# Patient Record
Sex: MALE | Race: WHITE | NOT HISPANIC OR LATINO | Employment: OTHER | URBAN - METROPOLITAN AREA
[De-identification: names, ages, dates, MRNs, and addresses within clinical notes are randomized per-mention and may not be internally consistent; named-entity substitution may affect disease eponyms.]

---

## 2017-02-24 ENCOUNTER — TRANSCRIBE ORDERS (OUTPATIENT)
Dept: LAB | Facility: CLINIC | Age: 62
End: 2017-02-24

## 2017-02-24 ENCOUNTER — APPOINTMENT (OUTPATIENT)
Dept: LAB | Facility: CLINIC | Age: 62
End: 2017-02-24
Payer: COMMERCIAL

## 2017-02-24 DIAGNOSIS — M31.5 GIANT CELL ARTERITIS WITH POLYMYALGIA RHEUMATICA (HCC): ICD-10-CM

## 2017-02-24 DIAGNOSIS — M31.5 GIANT CELL ARTERITIS WITH POLYMYALGIA RHEUMATICA (HCC): Primary | ICD-10-CM

## 2017-02-24 LAB
ALBUMIN SERPL BCP-MCNC: 4 G/DL (ref 3.5–5)
ALP SERPL-CCNC: 98 U/L (ref 46–116)
ALT SERPL W P-5'-P-CCNC: 36 U/L (ref 12–78)
ANION GAP SERPL CALCULATED.3IONS-SCNC: 7 MMOL/L (ref 4–13)
AST SERPL W P-5'-P-CCNC: 24 U/L (ref 5–45)
BASOPHILS # BLD AUTO: 0.02 THOUSANDS/ΜL (ref 0–0.1)
BASOPHILS NFR BLD AUTO: 0 % (ref 0–1)
BILIRUB SERPL-MCNC: 0.6 MG/DL (ref 0.2–1)
BILIRUB UR QL STRIP: NEGATIVE
BUN SERPL-MCNC: 18 MG/DL (ref 5–25)
CALCIUM SERPL-MCNC: 8.9 MG/DL (ref 8.3–10.1)
CHLORIDE SERPL-SCNC: 105 MMOL/L (ref 100–108)
CLARITY UR: CLEAR
CO2 SERPL-SCNC: 30 MMOL/L (ref 21–32)
COLOR UR: YELLOW
CREAT SERPL-MCNC: 0.92 MG/DL (ref 0.6–1.3)
CRP SERPL QL: <3 MG/L
EOSINOPHIL # BLD AUTO: 0.1 THOUSAND/ΜL (ref 0–0.61)
EOSINOPHIL NFR BLD AUTO: 2 % (ref 0–6)
ERYTHROCYTE [DISTWIDTH] IN BLOOD BY AUTOMATED COUNT: 13.2 % (ref 11.6–15.1)
ERYTHROCYTE [SEDIMENTATION RATE] IN BLOOD: 3 MM/HOUR (ref 0–10)
GFR SERPL CREATININE-BSD FRML MDRD: >60 ML/MIN/1.73SQ M
GLUCOSE SERPL-MCNC: 114 MG/DL (ref 65–140)
GLUCOSE UR STRIP-MCNC: NEGATIVE MG/DL
HCT VFR BLD AUTO: 41.5 % (ref 36.5–49.3)
HGB BLD-MCNC: 14.1 G/DL (ref 12–17)
HGB UR QL STRIP.AUTO: NEGATIVE
KETONES UR STRIP-MCNC: NEGATIVE MG/DL
LEUKOCYTE ESTERASE UR QL STRIP: NEGATIVE
LYMPHOCYTES # BLD AUTO: 1.51 THOUSANDS/ΜL (ref 0.6–4.47)
LYMPHOCYTES NFR BLD AUTO: 25 % (ref 14–44)
MCH RBC QN AUTO: 29.7 PG (ref 26.8–34.3)
MCHC RBC AUTO-ENTMCNC: 34 G/DL (ref 31.4–37.4)
MCV RBC AUTO: 87 FL (ref 82–98)
MONOCYTES # BLD AUTO: 0.43 THOUSAND/ΜL (ref 0.17–1.22)
MONOCYTES NFR BLD AUTO: 7 % (ref 4–12)
NEUTROPHILS # BLD AUTO: 3.97 THOUSANDS/ΜL (ref 1.85–7.62)
NEUTS SEG NFR BLD AUTO: 66 % (ref 43–75)
NITRITE UR QL STRIP: NEGATIVE
PH UR STRIP.AUTO: 6 [PH] (ref 4.5–8)
PLATELET # BLD AUTO: 251 THOUSANDS/UL (ref 149–390)
PMV BLD AUTO: 9.5 FL (ref 8.9–12.7)
POTASSIUM SERPL-SCNC: 3.9 MMOL/L (ref 3.5–5.3)
PROT SERPL-MCNC: 6.7 G/DL (ref 6.4–8.2)
PROT UR STRIP-MCNC: NEGATIVE MG/DL
RBC # BLD AUTO: 4.75 MILLION/UL (ref 3.88–5.62)
SODIUM SERPL-SCNC: 142 MMOL/L (ref 136–145)
SP GR UR STRIP.AUTO: 1.02 (ref 1–1.03)
UROBILINOGEN UR QL STRIP.AUTO: 0.2 E.U./DL
WBC # BLD AUTO: 6.03 THOUSAND/UL (ref 4.31–10.16)

## 2017-02-24 PROCEDURE — 81003 URINALYSIS AUTO W/O SCOPE: CPT | Performed by: INTERNAL MEDICINE

## 2017-02-24 PROCEDURE — 86140 C-REACTIVE PROTEIN: CPT

## 2017-02-24 PROCEDURE — 85025 COMPLETE CBC W/AUTO DIFF WBC: CPT

## 2017-02-24 PROCEDURE — 36415 COLL VENOUS BLD VENIPUNCTURE: CPT

## 2017-02-24 PROCEDURE — 85652 RBC SED RATE AUTOMATED: CPT

## 2017-02-24 PROCEDURE — 80053 COMPREHEN METABOLIC PANEL: CPT

## 2017-05-10 ENCOUNTER — APPOINTMENT (OUTPATIENT)
Dept: LAB | Facility: CLINIC | Age: 62
End: 2017-05-10
Payer: COMMERCIAL

## 2017-05-10 ENCOUNTER — TRANSCRIBE ORDERS (OUTPATIENT)
Dept: LAB | Facility: CLINIC | Age: 62
End: 2017-05-10

## 2017-05-10 DIAGNOSIS — N13.8 ENLARGED PROSTATE WITH URINARY OBSTRUCTION: Primary | ICD-10-CM

## 2017-05-10 DIAGNOSIS — N13.8 ENLARGED PROSTATE WITH URINARY OBSTRUCTION: ICD-10-CM

## 2017-05-10 DIAGNOSIS — N40.1 ENLARGED PROSTATE WITH URINARY OBSTRUCTION: Primary | ICD-10-CM

## 2017-05-10 DIAGNOSIS — N40.1 ENLARGED PROSTATE WITH URINARY OBSTRUCTION: ICD-10-CM

## 2017-05-10 LAB — PSA SERPL-MCNC: 1.8 NG/ML (ref 0–4)

## 2017-05-10 PROCEDURE — 36415 COLL VENOUS BLD VENIPUNCTURE: CPT

## 2017-05-10 PROCEDURE — G0103 PSA SCREENING: HCPCS

## 2017-07-18 ENCOUNTER — APPOINTMENT (OUTPATIENT)
Dept: LAB | Facility: CLINIC | Age: 62
End: 2017-07-18
Payer: COMMERCIAL

## 2017-07-18 ENCOUNTER — TRANSCRIBE ORDERS (OUTPATIENT)
Dept: LAB | Facility: CLINIC | Age: 62
End: 2017-07-18

## 2017-07-18 DIAGNOSIS — R53.83 FATIGUE, UNSPECIFIED TYPE: ICD-10-CM

## 2017-07-18 DIAGNOSIS — R53.83 FATIGUE, UNSPECIFIED TYPE: Primary | ICD-10-CM

## 2017-07-18 LAB
ALBUMIN SERPL BCP-MCNC: 3.9 G/DL (ref 3.5–5)
ALP SERPL-CCNC: 95 U/L (ref 46–116)
ALT SERPL W P-5'-P-CCNC: 29 U/L (ref 12–78)
ANION GAP SERPL CALCULATED.3IONS-SCNC: 9 MMOL/L (ref 4–13)
AST SERPL W P-5'-P-CCNC: 23 U/L (ref 5–45)
BILIRUB SERPL-MCNC: 0.6 MG/DL (ref 0.2–1)
BUN SERPL-MCNC: 16 MG/DL (ref 5–25)
CALCIUM SERPL-MCNC: 9.1 MG/DL (ref 8.3–10.1)
CHLORIDE SERPL-SCNC: 105 MMOL/L (ref 100–108)
CO2 SERPL-SCNC: 29 MMOL/L (ref 21–32)
CREAT SERPL-MCNC: 0.88 MG/DL (ref 0.6–1.3)
GFR SERPL CREATININE-BSD FRML MDRD: >60 ML/MIN/1.73SQ M
GLUCOSE P FAST SERPL-MCNC: 103 MG/DL (ref 65–99)
HCT VFR BLD AUTO: 42.4 % (ref 36.5–49.3)
HGB BLD-MCNC: 14.1 G/DL (ref 12–17)
POTASSIUM SERPL-SCNC: 3.7 MMOL/L (ref 3.5–5.3)
PROT SERPL-MCNC: 6.5 G/DL (ref 6.4–8.2)
SODIUM SERPL-SCNC: 143 MMOL/L (ref 136–145)
TSH SERPL DL<=0.05 MIU/L-ACNC: 0.86 UIU/ML (ref 0.36–3.74)

## 2017-07-18 PROCEDURE — 85018 HEMOGLOBIN: CPT

## 2017-07-18 PROCEDURE — 80053 COMPREHEN METABOLIC PANEL: CPT

## 2017-07-18 PROCEDURE — 36415 COLL VENOUS BLD VENIPUNCTURE: CPT

## 2017-07-18 PROCEDURE — 85014 HEMATOCRIT: CPT

## 2017-07-18 PROCEDURE — 84443 ASSAY THYROID STIM HORMONE: CPT

## 2017-08-10 ENCOUNTER — TRANSCRIBE ORDERS (OUTPATIENT)
Dept: LAB | Facility: CLINIC | Age: 62
End: 2017-08-10

## 2017-08-10 ENCOUNTER — APPOINTMENT (OUTPATIENT)
Dept: LAB | Facility: CLINIC | Age: 62
End: 2017-08-10
Payer: COMMERCIAL

## 2017-08-10 DIAGNOSIS — Z79.899 ENCOUNTER FOR LONG-TERM CURRENT USE OF MEDICATION: ICD-10-CM

## 2017-08-10 DIAGNOSIS — M35.3 POLYMYALGIA RHEUMATICA (HCC): ICD-10-CM

## 2017-08-10 DIAGNOSIS — M31.5 GIANT CELL ARTERITIS WITH POLYMYALGIA RHEUMATICA (HCC): ICD-10-CM

## 2017-08-10 DIAGNOSIS — M35.3 POLYMYALGIA RHEUMATICA (HCC): Primary | ICD-10-CM

## 2017-08-10 DIAGNOSIS — Z79.899 ENCOUNTER FOR LONG-TERM CURRENT USE OF MEDICATION: Primary | ICD-10-CM

## 2017-08-10 LAB
25(OH)D3 SERPL-MCNC: 28.8 NG/ML (ref 30–100)
ALBUMIN SERPL BCP-MCNC: 3.9 G/DL (ref 3.5–5)
ALP SERPL-CCNC: 100 U/L (ref 46–116)
ALT SERPL W P-5'-P-CCNC: 37 U/L (ref 12–78)
ANION GAP SERPL CALCULATED.3IONS-SCNC: 8 MMOL/L (ref 4–13)
AST SERPL W P-5'-P-CCNC: 21 U/L (ref 5–45)
BACTERIA UR QL AUTO: NORMAL /HPF
BILIRUB SERPL-MCNC: 0.4 MG/DL (ref 0.2–1)
BILIRUB UR QL STRIP: NEGATIVE
BUN SERPL-MCNC: 22 MG/DL (ref 5–25)
CALCIUM SERPL-MCNC: 9.2 MG/DL (ref 8.3–10.1)
CHLORIDE SERPL-SCNC: 105 MMOL/L (ref 100–108)
CLARITY UR: CLEAR
CO2 SERPL-SCNC: 30 MMOL/L (ref 21–32)
COLOR UR: YELLOW
CREAT SERPL-MCNC: 1.04 MG/DL (ref 0.6–1.3)
CRP SERPL QL: <3 MG/L
ERYTHROCYTE [DISTWIDTH] IN BLOOD BY AUTOMATED COUNT: 13.1 % (ref 11.6–15.1)
ERYTHROCYTE [SEDIMENTATION RATE] IN BLOOD: 2 MM/HOUR (ref 0–10)
GFR SERPL CREATININE-BSD FRML MDRD: 77 ML/MIN/1.73SQ M
GLUCOSE SERPL-MCNC: 107 MG/DL (ref 65–140)
GLUCOSE UR STRIP-MCNC: NEGATIVE MG/DL
HCT VFR BLD AUTO: 42.8 % (ref 36.5–49.3)
HGB BLD-MCNC: 14.3 G/DL (ref 12–17)
HGB UR QL STRIP.AUTO: NEGATIVE
KETONES UR STRIP-MCNC: NEGATIVE MG/DL
LEUKOCYTE ESTERASE UR QL STRIP: NEGATIVE
MCH RBC QN AUTO: 29.1 PG (ref 26.8–34.3)
MCHC RBC AUTO-ENTMCNC: 33.4 G/DL (ref 31.4–37.4)
MCV RBC AUTO: 87 FL (ref 82–98)
MUCOUS THREADS UR QL AUTO: NORMAL
NITRITE UR QL STRIP: NEGATIVE
NON-SQ EPI CELLS URNS QL MICRO: NORMAL /HPF
PH UR STRIP.AUTO: 6 [PH] (ref 4.5–8)
PLATELET # BLD AUTO: 251 THOUSANDS/UL (ref 149–390)
PMV BLD AUTO: 9.7 FL (ref 8.9–12.7)
POTASSIUM SERPL-SCNC: 4.3 MMOL/L (ref 3.5–5.3)
PROT SERPL-MCNC: 6.9 G/DL (ref 6.4–8.2)
PROT UR STRIP-MCNC: NEGATIVE MG/DL
RBC # BLD AUTO: 4.91 MILLION/UL (ref 3.88–5.62)
RBC #/AREA URNS AUTO: NORMAL /HPF
SODIUM SERPL-SCNC: 143 MMOL/L (ref 136–145)
SP GR UR STRIP.AUTO: >=1.03 (ref 1–1.03)
TSH SERPL DL<=0.05 MIU/L-ACNC: 1.68 UIU/ML (ref 0.36–3.74)
UROBILINOGEN UR QL STRIP.AUTO: 0.2 E.U./DL
WBC # BLD AUTO: 6.66 THOUSAND/UL (ref 4.31–10.16)
WBC #/AREA URNS AUTO: NORMAL /HPF

## 2017-08-10 PROCEDURE — 86140 C-REACTIVE PROTEIN: CPT

## 2017-08-10 PROCEDURE — 82306 VITAMIN D 25 HYDROXY: CPT

## 2017-08-10 PROCEDURE — 84443 ASSAY THYROID STIM HORMONE: CPT

## 2017-08-10 PROCEDURE — 85652 RBC SED RATE AUTOMATED: CPT

## 2017-08-10 PROCEDURE — 86618 LYME DISEASE ANTIBODY: CPT

## 2017-08-10 PROCEDURE — 81001 URINALYSIS AUTO W/SCOPE: CPT | Performed by: INTERNAL MEDICINE

## 2017-08-10 PROCEDURE — 80053 COMPREHEN METABOLIC PANEL: CPT

## 2017-08-10 PROCEDURE — 85027 COMPLETE CBC AUTOMATED: CPT

## 2017-08-10 PROCEDURE — 36415 COLL VENOUS BLD VENIPUNCTURE: CPT

## 2017-08-10 PROCEDURE — 82542 COL CHROMOTOGRAPHY QUAL/QUAN: CPT

## 2017-08-11 LAB
B BURGDOR IGG SER IA-ACNC: 0.07
B BURGDOR IGM SER IA-ACNC: 0.22

## 2017-08-15 LAB
CLOMIPRAMINE SERPL-MCNC: 33 NG/ML (ref 70–200)
CLOMIPRAMINE+NOR SERPL-MCNC: 112 NG/ML (ref 220–500)
NORCLOMIPRAMINE SERPL-MCNC: 79 NG/ML (ref 150–300)

## 2017-10-09 ENCOUNTER — ALLSCRIPTS OFFICE VISIT (OUTPATIENT)
Dept: OTHER | Facility: OTHER | Age: 62
End: 2017-10-09

## 2017-10-09 DIAGNOSIS — S86.012A STRAIN OF LEFT ACHILLES TENDON: ICD-10-CM

## 2017-10-11 NOTE — PROGRESS NOTES
Assessment  1  Pain of left heel (729 5) (M34 432)   2  Left elbow pain (719 42) (M26 522)   3  Strain of left Achilles tendon, initial encounter (865 09) (S86 012A)    Plan  Strain of left Achilles tendon, initial encounter    · Follow-up visit in 6 weeks Evaluation and Treatment  Follow-up with Dr Kevin Parada, sports  medicine  Status: Complete  Done: [id number]   · *1 - [facility]  57year old male with 3 weeks of achilles  strain post fall from a   Please evaluate and treat as necessary  Status:  Active  Requested for: [id number]  Care Summary provided  : Yes    Discussion/Summary    We discussed with Mr Toy Fonseca our findings being most consistent with a left Achilles strain  Information regarding stretching and exercises provided to help with this as well as script for physical therapy  Can use aleve prn  F/u in 6 weeks  The patient was counseled regarding diagnostic results,-instructions for management,-risk factor reductions,-patient and family education,-impressions,-importance of compliance with treatment  The treatment plan was reviewed with the patient/guardian  The patient/guardian understands and agrees with the treatment plan      Chief Complaint  left calf pain      History of Present Illness  Foot Problem: Symptoms:  pain, but-no swelling,-no localized bruising,-no discoloration,-no difficulty bearing weight-and-no decreased range of motion  Exacerbating factors:  direct pressure  Relieving factors:  rest-and-ice, but-not relieved by nonsteroidal anti-inflammatory drugs  HPI: Mr Toy Fonseca is a 57year old gentleman presenting with multiple joint complaints but most concerning for a left calf pain after a fall from his  approximately 2-3 weeks ago  He primarily notes in the distal portion of his calf, especially with kneeling on her right leg and going to stand with the left leg from that position  Rest does improve the pain        Review of Systems    Constitutional: No fever or chills, feels well, no tiredness, no recent weight loss or weight gain  Eyes: No complaints of red eyes, no eyesight problems  ENT: no complaints of loss of hearing, no nosebleeds, no sore throat  Cardiovascular: No complaints of chest pain, no palpitations, no leg claudication or lower extremity edema  Respiratory: No complaints of shortness of breath, no wheezing, no cough  Gastrointestinal: No complaints of abdominal pain, no constipation, no nausea or vomiting, no diarrhea or bloody stools  Genitourinary: No complaints of dysuria or incontinence, no hesitancy, no nocturia  Musculoskeletal: as noted in HPI  Integumentary: No complaints of skin rash or lesion, no itching or dry skin, no skin wounds  Neurological: numbness  Psychiatric: depression  Endocrine: No muscle weakness, no frequent urination, no excessive thirst, no feelings of weakness  Active Problems  1  Closed displaced fracture of distal phalanx of left index finger, initial encounter (816 02)   (M62 461N)   2  Closed nondisplaced fracture of middle phalanx of left middle finger, initial encounter   (816 01) (S62 653A)   3  Olecranon bursitis of left elbow (726 33) (M70 22)   4  Pain in joint of left shoulder (719 41) (M25 512)   5  Shoulder Instability Labral Tear Left Posterior (718 01)   6  Sprained Acromioclavicular Joint (840 0)    Past Medical History    The active problems and past medical history were reviewed and updated today  Surgical History    The surgical history was reviewed and updated today  Family History  Mother    · Family history of malignant neoplasm (V16 9) (Z80 9)   · Family history of osteopenia (V17 89) (Z82 69)    The family history was reviewed and updated today  Social History   · Never a smoker  The social history was reviewed and updated today  The social history was reviewed and is unchanged  Current Meds   1   ClomiPRAMINE HCl - 25 MG Oral Capsule Recorded   2  ClonazePAM 0 5 MG Oral Tablet Recorded   3  Folic Acid 1 MG Oral Tablet; Therapy: (Recorded:09Oct2017) to Recorded   4  Methotrexate Sodium 2 5 MG TABS; Therapy: (Recorded:09Oct2017) to Recorded   5  Simvastatin 10 MG Oral Tablet Recorded   6  Viibryd TABS Recorded   7  Vitamin D3 1000 UNIT Oral Tablet; Therapy: (Recorded:09Oct2017) to Recorded   8  Vyvanse CAPS Recorded    The medication list was reviewed and updated today  Allergies  Denied    1  Nitrous Oxide    Vitals   Recorded: [id number] 10:08AM   Heart Rate 97   Systolic 070   Diastolic 66   Height 5 ft 7 in   Weight 157 lb    BMI Calculated 24 59   BSA Calculated 1 82     Physical Exam    Left Ankle: Appearance: no Achilles defect,-no Achilles thickening,-no dislocation,-no ecchymosis,-no erythema-and-no swelling  Tenderness: Achilles tendon insertion,-Achilles musculotendinous junction-and-retrocalcaneal bursae, but-not the ATFL,-not the deltoid ligament,-not the lateral malleolus-and-not the medial malleolus  Most painful to palpation at the musculotendinous junction of the achilles  ROM: Full  Motor: Normal  Special Tests: negative Anterior Drawer Sign,-no posterior tibialis tendon subluxation,-negative Squeeze Test-and-negative Magana Test    Constitutional - General appearance: Normal    Cardiovascular - Pulses: Normal  Posterior tibialis pulse was 2+ on the left  Dorsalis pedis pulse was 2+ on the left  Neurologic - Sensation: Normal light touch intact of left foot  Psychiatric - Orientation to person, place, and time: Normal -Mood and affect: Normal    Eyes   Pupils and irises: Normal        Attending Note  Attending Note Arnaud Tubbs: Attending Note: I interviewed, took the history and examined the patient,-I discussed the case with the Resident and reviewed the Resident's note,-I supervised the Resident-and-I agree with the Resident management plan as it was presented to me   Level of Participation: I was present in clinic and examined the patient  I agree with the Resident's note  Future Appointments    Date/Time Provider Specialty Site   11/20/2017 08:20 PABLITO Russo  Orthopedic Surgery [facility]     Signatures   Electronically signed by : Nay Kennedy DO; Oct  9 2017  1:23PM EST                       (Author)    Electronically signed by :  PABLITO Marquez ; Oct 10 2017  2:26PM EST                       (Author)

## 2017-10-18 ENCOUNTER — GENERIC CONVERSION - ENCOUNTER (OUTPATIENT)
Dept: OTHER | Facility: OTHER | Age: 62
End: 2017-10-18

## 2017-10-18 ENCOUNTER — APPOINTMENT (OUTPATIENT)
Dept: PHYSICAL THERAPY | Facility: OTHER | Age: 62
End: 2017-10-18
Payer: COMMERCIAL

## 2017-10-18 PROCEDURE — 97161 PT EVAL LOW COMPLEX 20 MIN: CPT

## 2017-10-18 PROCEDURE — G8990 OTHER PT/OT CURRENT STATUS: HCPCS

## 2017-10-18 PROCEDURE — G8991 OTHER PT/OT GOAL STATUS: HCPCS

## 2017-10-24 ENCOUNTER — APPOINTMENT (OUTPATIENT)
Dept: PHYSICAL THERAPY | Facility: OTHER | Age: 62
End: 2017-10-24
Payer: COMMERCIAL

## 2017-10-24 PROCEDURE — 97112 NEUROMUSCULAR REEDUCATION: CPT

## 2017-10-24 PROCEDURE — 97110 THERAPEUTIC EXERCISES: CPT

## 2017-10-26 ENCOUNTER — GENERIC CONVERSION - ENCOUNTER (OUTPATIENT)
Dept: OTHER | Facility: OTHER | Age: 62
End: 2017-10-26

## 2017-10-26 ENCOUNTER — APPOINTMENT (OUTPATIENT)
Dept: PHYSICAL THERAPY | Facility: OTHER | Age: 62
End: 2017-10-26
Payer: COMMERCIAL

## 2017-10-26 PROCEDURE — 97140 MANUAL THERAPY 1/> REGIONS: CPT

## 2017-10-26 PROCEDURE — 97110 THERAPEUTIC EXERCISES: CPT

## 2017-10-26 PROCEDURE — 97112 NEUROMUSCULAR REEDUCATION: CPT

## 2017-11-01 ENCOUNTER — APPOINTMENT (OUTPATIENT)
Dept: PHYSICAL THERAPY | Facility: OTHER | Age: 62
End: 2017-11-01
Payer: COMMERCIAL

## 2017-11-01 PROCEDURE — 97110 THERAPEUTIC EXERCISES: CPT

## 2017-11-01 PROCEDURE — 97140 MANUAL THERAPY 1/> REGIONS: CPT

## 2017-11-01 PROCEDURE — 97112 NEUROMUSCULAR REEDUCATION: CPT

## 2017-11-02 ENCOUNTER — APPOINTMENT (OUTPATIENT)
Dept: PHYSICAL THERAPY | Facility: OTHER | Age: 62
End: 2017-11-02
Payer: COMMERCIAL

## 2017-11-06 ENCOUNTER — APPOINTMENT (OUTPATIENT)
Dept: PHYSICAL THERAPY | Facility: OTHER | Age: 62
End: 2017-11-06
Payer: COMMERCIAL

## 2017-11-06 PROCEDURE — 97112 NEUROMUSCULAR REEDUCATION: CPT

## 2017-11-06 PROCEDURE — 97140 MANUAL THERAPY 1/> REGIONS: CPT

## 2017-11-06 PROCEDURE — 97110 THERAPEUTIC EXERCISES: CPT

## 2017-11-08 ENCOUNTER — APPOINTMENT (OUTPATIENT)
Dept: PHYSICAL THERAPY | Facility: OTHER | Age: 62
End: 2017-11-08
Payer: COMMERCIAL

## 2017-11-08 PROCEDURE — 97140 MANUAL THERAPY 1/> REGIONS: CPT

## 2017-11-08 PROCEDURE — G8991 OTHER PT/OT GOAL STATUS: HCPCS

## 2017-11-08 PROCEDURE — G8990 OTHER PT/OT CURRENT STATUS: HCPCS

## 2017-11-08 PROCEDURE — 97110 THERAPEUTIC EXERCISES: CPT

## 2017-11-13 ENCOUNTER — APPOINTMENT (OUTPATIENT)
Dept: PHYSICAL THERAPY | Facility: OTHER | Age: 62
End: 2017-11-13
Payer: COMMERCIAL

## 2017-11-15 ENCOUNTER — APPOINTMENT (OUTPATIENT)
Dept: PHYSICAL THERAPY | Facility: OTHER | Age: 62
End: 2017-11-15
Payer: COMMERCIAL

## 2017-11-20 ENCOUNTER — ALLSCRIPTS OFFICE VISIT (OUTPATIENT)
Dept: OTHER | Facility: OTHER | Age: 62
End: 2017-11-20

## 2017-11-20 ENCOUNTER — APPOINTMENT (OUTPATIENT)
Dept: PHYSICAL THERAPY | Facility: OTHER | Age: 62
End: 2017-11-20
Payer: COMMERCIAL

## 2017-11-22 ENCOUNTER — APPOINTMENT (OUTPATIENT)
Dept: PHYSICAL THERAPY | Facility: OTHER | Age: 62
End: 2017-11-22
Payer: COMMERCIAL

## 2017-11-25 NOTE — PROGRESS NOTES
Assessment  1  Strain of left Achilles tendon, initial encounter (034 09) (J20 013T)    Plan     · Follow-up PRN Evaluation and Treatment  Follow-up with Dr Shana Keys, sports medicine Status: Complete  Done: [id number]    Discussion/Summary    We are very happy with his response to therapy and explained the importance of continuing with home exercises  He can f/u prn if recurrence occurs  The patient was counseled regarding instructions for management,-- patient and family education,-- impressions,-- importance of compliance with treatment  The treatment plan was reviewed with the patient/guardian  The patient/guardian understands and agrees with the treatment plan      Chief Complaint    1  Leg Pain    History of Present Illness  HPI: Mr Deepti Montague presents for f/u left achilles strain  He feels perfect and has had no recurrence of the pain  He has not used any medication (NSAID, acetaminophen) over the last 3 weeks  Leg Pain: Domnick Rides presents with complaints of no leg pain  Associated symptoms include no swelling,-- no ecchymosis,-- no decreased range of motion,-- no difficulty bearing weight,-- no difficulty ambulating,-- no calf tenderness,-- no numbness,-- no tingling-- and-- no weakness  Review of Systems   Constitutional: No fever or chills, feels well, no tiredness, no recent weight loss or weight gain  Eyes: No complaints of red eyes, no eyesight problems  ENT: no complaints of loss of hearing, no nosebleeds, no sore throat  Cardiovascular: No complaints of chest pain, no palpitations, no leg claudication or lower extremity edema  Respiratory: No complaints of shortness of breath, no wheezing, no cough  Gastrointestinal: No complaints of abdominal pain, no constipation, no nausea or vomiting, no diarrhea or bloody stools  Genitourinary: No complaints of dysuria or incontinence, no hesitancy, no nocturia  Musculoskeletal: as noted in HPI    Integumentary: No complaints of skin rash or lesion, no itching or dry skin, no skin wounds  Neurological: No complaints of headache, no confusion, no numbness or tingling, no dizziness  Psychiatric: No suicidal thoughts, no anxiety, no depression  Endocrine: No muscle weakness, no frequent urination, no excessive thirst, no feelings of weakness  Active Problems    1  Closed displaced fracture of distal phalanx of left index finger, initial encounter (816 02) (M70 109T)   2  Closed nondisplaced fracture of middle phalanx of left middle finger, initial encounter (816 01) (S62 653A)   3  Left elbow pain (719 42) (M25 522)   4  Olecranon bursitis of left elbow (726 33) (M70 22)   5  Pain in joint of left shoulder (719 41) (M25 512)   6  Pain of left heel (729 5) (M79 672)   7  Shoulder Instability Labral Tear Left Posterior (718 01)   8  Sprained Acromioclavicular Joint (840 0)   9  Strain of left Achilles tendon, initial encounter (845 09) (P73 110T)    Past Medical History    The active problems and past medical history were reviewed and updated today  Surgical History    The surgical history was reviewed and updated today  Family History  Mother    · Family history of malignant neoplasm (V16 9) (Z80 9)   · Family history of osteopenia (V17 89) (Z82 69)    The family history was reviewed and updated today  Social History     · Never a smoker  The social history was reviewed and updated today  The social history was reviewed and is unchanged  Current Meds   1  ClomiPRAMINE HCl - 25 MG Oral Capsule Recorded   2  ClonazePAM 0 5 MG Oral Tablet Recorded   3  Folic Acid 1 MG Oral Tablet; Therapy: (Recorded:09Oct2017) to Recorded   4  Methotrexate Sodium 2 5 MG TABS; Therapy: (Recorded:09Oct2017) to Recorded   5  Simvastatin 10 MG Oral Tablet Recorded   6  Viibryd TABS Recorded   7  Vitamin D3 1000 UNIT Oral Tablet; Therapy: (Recorded:09Oct2017) to Recorded   8   Vyvanse CAPS Recorded    The medication list was reviewed and updated today  Allergies  Denied    1  Nitrous Oxide    Vitals   Recorded: 20Nov2017 08:25AM   Heart Rate 98   Systolic 019   Diastolic 67   Height 5 ft 7 in   Weight 157 lb    BMI Calculated 24 59   BSA Calculated 1 82       Physical Exam    Left Ankle: Appearance: no Achilles defect,-- no Achilles thickening,-- no dislocation,-- no ecchymosis,-- no erythema-- and-- no swelling  Tenderness: not the Achilles tendon insertion,-- not the Achilles musculotendinous junction,-- not the ATFL,-- not the retrocalcaneal bursae,-- not the deltoid ligament,-- not the lateral malleolus-- and-- not the medial malleolus  Most painful to palpation at the musculotendinous junction of the achilles  ROM: Full  Motor: Normal  Special Tests: negative Squeeze Test-- and-- negative Magana Test   Constitutional - General appearance: Normal   Cardiovascular - Pulses: Normal  Posterior tibialis pulse was 2+ on the left  Dorsalis pedis pulse was 2+ on the left  Neurologic - Sensation: Normal light touch intact of left foot  Psychiatric - Orientation to person, place, and time: Normal -- Mood and affect: Normal   Eyes  Pupils and irises: Normal        Attending Note  Attending Note Esperanza Arnold: Attending Note: I interviewed, took the history and examined the patient,-- I discussed the case with the Resident and reviewed the Resident's note,-- I supervised the Resident-- and-- I agree with the Resident management plan as it was presented to me  Level of Participation: I was present in clinic and examined the patient  I agree with the Resident's note  End of Encounter Meds    1  ClomiPRAMINE HCl - 25 MG Oral Capsule Recorded   2  ClonazePAM 0 5 MG Oral Tablet Recorded   3  Folic Acid 1 MG Oral Tablet; Therapy: (Recorded:09Oct2017) to Recorded   4  Methotrexate Sodium 2 5 MG TABS; Therapy: (Recorded:09Oct2017) to Recorded   5  Simvastatin 10 MG Oral Tablet Recorded   6  Viibryd TABS Recorded   7  Vitamin D3 1000 UNIT Oral Tablet;  Therapy: (RIRGBPGT:[id number]) to Recorded   8  Vyvanse CAPS Recorded    Signatures   Electronically signed by : Keerthi Lew DO; Nov 20 2017  8:44AM EST                       (Author)    Electronically signed by :  PABLITO Cody ; Nov 24 2017  8:08AM EST                       (Author)

## 2018-01-13 VITALS
SYSTOLIC BLOOD PRESSURE: 111 MMHG | HEIGHT: 67 IN | HEART RATE: 98 BPM | BODY MASS INDEX: 24.64 KG/M2 | DIASTOLIC BLOOD PRESSURE: 67 MMHG | WEIGHT: 157 LBS

## 2018-01-14 VITALS
WEIGHT: 157 LBS | SYSTOLIC BLOOD PRESSURE: 101 MMHG | BODY MASS INDEX: 24.64 KG/M2 | DIASTOLIC BLOOD PRESSURE: 66 MMHG | HEART RATE: 97 BPM | HEIGHT: 67 IN

## 2018-02-03 ENCOUNTER — TRANSCRIBE ORDERS (OUTPATIENT)
Dept: LAB | Facility: CLINIC | Age: 63
End: 2018-02-03

## 2018-02-03 ENCOUNTER — APPOINTMENT (OUTPATIENT)
Dept: LAB | Facility: CLINIC | Age: 63
End: 2018-02-03
Payer: COMMERCIAL

## 2018-02-03 DIAGNOSIS — M31.5 GIANT CELL ARTERITIS WITH POLYMYALGIA RHEUMATICA (HCC): ICD-10-CM

## 2018-02-03 DIAGNOSIS — E78.5 HYPERLIPIDEMIA, UNSPECIFIED HYPERLIPIDEMIA TYPE: ICD-10-CM

## 2018-02-03 DIAGNOSIS — R53.83 FATIGUE, UNSPECIFIED TYPE: ICD-10-CM

## 2018-02-03 DIAGNOSIS — M35.3 POLYMYALGIA RHEUMATICA (HCC): Primary | ICD-10-CM

## 2018-02-03 DIAGNOSIS — E78.5 HYPERLIPIDEMIA, UNSPECIFIED HYPERLIPIDEMIA TYPE: Primary | ICD-10-CM

## 2018-02-03 DIAGNOSIS — M35.3 POLYMYALGIA RHEUMATICA (HCC): ICD-10-CM

## 2018-02-03 LAB
ALBUMIN SERPL BCP-MCNC: 3.7 G/DL (ref 3.5–5)
ALP SERPL-CCNC: 107 U/L (ref 46–116)
ALT SERPL W P-5'-P-CCNC: 37 U/L (ref 12–78)
ANION GAP SERPL CALCULATED.3IONS-SCNC: 10 MMOL/L (ref 4–13)
AST SERPL W P-5'-P-CCNC: 24 U/L (ref 5–45)
BASOPHILS # BLD AUTO: 0.03 THOUSANDS/ΜL (ref 0–0.1)
BASOPHILS NFR BLD AUTO: 1 % (ref 0–1)
BILIRUB SERPL-MCNC: 0.7 MG/DL (ref 0.2–1)
BILIRUB UR QL STRIP: NEGATIVE
BUN SERPL-MCNC: 16 MG/DL (ref 5–25)
CALCIUM SERPL-MCNC: 9.1 MG/DL (ref 8.3–10.1)
CHLORIDE SERPL-SCNC: 105 MMOL/L (ref 100–108)
CHOLEST SERPL-MCNC: 184 MG/DL (ref 50–200)
CLARITY UR: CLEAR
CO2 SERPL-SCNC: 28 MMOL/L (ref 21–32)
COLOR UR: YELLOW
CREAT SERPL-MCNC: 0.99 MG/DL (ref 0.6–1.3)
CRP SERPL QL: <3 MG/L
EOSINOPHIL # BLD AUTO: 0.15 THOUSAND/ΜL (ref 0–0.61)
EOSINOPHIL NFR BLD AUTO: 3 % (ref 0–6)
ERYTHROCYTE [DISTWIDTH] IN BLOOD BY AUTOMATED COUNT: 13.2 % (ref 11.6–15.1)
ERYTHROCYTE [SEDIMENTATION RATE] IN BLOOD: 2 MM/HOUR (ref 0–10)
EST. AVERAGE GLUCOSE BLD GHB EST-MCNC: 131 MG/DL
GFR SERPL CREATININE-BSD FRML MDRD: 81 ML/MIN/1.73SQ M
GLUCOSE P FAST SERPL-MCNC: 94 MG/DL (ref 65–99)
GLUCOSE UR STRIP-MCNC: NEGATIVE MG/DL
HBA1C MFR BLD: 6.2 % (ref 4.2–6.3)
HCT VFR BLD AUTO: 43 % (ref 36.5–49.3)
HDLC SERPL-MCNC: 59 MG/DL (ref 40–60)
HGB BLD-MCNC: 14.6 G/DL (ref 12–17)
HGB UR QL STRIP.AUTO: NEGATIVE
KETONES UR STRIP-MCNC: NEGATIVE MG/DL
LDLC SERPL CALC-MCNC: 108 MG/DL (ref 0–100)
LEUKOCYTE ESTERASE UR QL STRIP: NEGATIVE
LYMPHOCYTES # BLD AUTO: 1.37 THOUSANDS/ΜL (ref 0.6–4.47)
LYMPHOCYTES NFR BLD AUTO: 28 % (ref 14–44)
MCH RBC QN AUTO: 29.4 PG (ref 26.8–34.3)
MCHC RBC AUTO-ENTMCNC: 34 G/DL (ref 31.4–37.4)
MCV RBC AUTO: 87 FL (ref 82–98)
MONOCYTES # BLD AUTO: 0.43 THOUSAND/ΜL (ref 0.17–1.22)
MONOCYTES NFR BLD AUTO: 9 % (ref 4–12)
NEUTROPHILS # BLD AUTO: 2.89 THOUSANDS/ΜL (ref 1.85–7.62)
NEUTS SEG NFR BLD AUTO: 59 % (ref 43–75)
NITRITE UR QL STRIP: NEGATIVE
PH UR STRIP.AUTO: 5.5 [PH] (ref 4.5–8)
PLATELET # BLD AUTO: 238 THOUSANDS/UL (ref 149–390)
PMV BLD AUTO: 9.5 FL (ref 8.9–12.7)
POTASSIUM SERPL-SCNC: 3.9 MMOL/L (ref 3.5–5.3)
PROT SERPL-MCNC: 6.6 G/DL (ref 6.4–8.2)
PROT UR STRIP-MCNC: NEGATIVE MG/DL
RBC # BLD AUTO: 4.97 MILLION/UL (ref 3.88–5.62)
SODIUM SERPL-SCNC: 143 MMOL/L (ref 136–145)
SP GR UR STRIP.AUTO: 1.01 (ref 1–1.03)
TRIGL SERPL-MCNC: 85 MG/DL
UROBILINOGEN UR QL STRIP.AUTO: 0.2 E.U./DL
WBC # BLD AUTO: 4.87 THOUSAND/UL (ref 4.31–10.16)

## 2018-02-03 PROCEDURE — 86140 C-REACTIVE PROTEIN: CPT

## 2018-02-03 PROCEDURE — 85025 COMPLETE CBC W/AUTO DIFF WBC: CPT

## 2018-02-03 PROCEDURE — 80061 LIPID PANEL: CPT

## 2018-02-03 PROCEDURE — 85652 RBC SED RATE AUTOMATED: CPT

## 2018-02-03 PROCEDURE — 83036 HEMOGLOBIN GLYCOSYLATED A1C: CPT

## 2018-02-03 PROCEDURE — 81003 URINALYSIS AUTO W/O SCOPE: CPT | Performed by: INTERNAL MEDICINE

## 2018-02-03 PROCEDURE — 80053 COMPREHEN METABOLIC PANEL: CPT

## 2018-02-03 PROCEDURE — 36415 COLL VENOUS BLD VENIPUNCTURE: CPT

## 2018-02-16 ENCOUNTER — APPOINTMENT (OUTPATIENT)
Dept: URGENT CARE | Age: 63
End: 2018-02-16
Payer: OTHER MISCELLANEOUS

## 2018-02-16 ENCOUNTER — HOSPITAL ENCOUNTER (OUTPATIENT)
Dept: RADIOLOGY | Age: 63
Discharge: HOME | End: 2018-02-16
Payer: OTHER MISCELLANEOUS

## 2018-02-16 ENCOUNTER — TRANSCRIBE ORDERS (OUTPATIENT)
Dept: ADMINISTRATIVE | Age: 63
End: 2018-02-16

## 2018-02-16 DIAGNOSIS — T14.90XA INJURY: Primary | ICD-10-CM

## 2018-02-16 DIAGNOSIS — T14.90XA INJURY: ICD-10-CM

## 2018-02-16 PROCEDURE — 99285 EMERGENCY DEPT VISIT HI MDM: CPT | Performed by: PREVENTIVE MEDICINE

## 2018-02-16 PROCEDURE — 90471 IMMUNIZATION ADMIN: CPT | Performed by: PREVENTIVE MEDICINE

## 2018-02-16 PROCEDURE — G0384 LEV 5 HOSP TYPE B ED VISIT: HCPCS | Performed by: PREVENTIVE MEDICINE

## 2018-02-16 PROCEDURE — 70450 CT HEAD/BRAIN W/O DYE: CPT

## 2018-02-19 ENCOUNTER — APPOINTMENT (OUTPATIENT)
Dept: URGENT CARE | Age: 63
End: 2018-02-19
Payer: OTHER MISCELLANEOUS

## 2018-02-19 PROCEDURE — 99213 OFFICE O/P EST LOW 20 MIN: CPT

## 2018-02-22 ENCOUNTER — APPOINTMENT (OUTPATIENT)
Dept: URGENT CARE | Age: 63
End: 2018-02-22
Payer: OTHER MISCELLANEOUS

## 2018-02-22 PROCEDURE — 99213 OFFICE O/P EST LOW 20 MIN: CPT | Performed by: PREVENTIVE MEDICINE

## 2018-03-19 ENCOUNTER — APPOINTMENT (OUTPATIENT)
Dept: RADIOLOGY | Age: 63
End: 2018-03-19
Payer: OTHER MISCELLANEOUS

## 2018-03-19 ENCOUNTER — APPOINTMENT (OUTPATIENT)
Dept: URGENT CARE | Age: 63
End: 2018-03-19
Payer: OTHER MISCELLANEOUS

## 2018-03-19 ENCOUNTER — TRANSCRIBE ORDERS (OUTPATIENT)
Dept: URGENT CARE | Age: 63
End: 2018-03-19

## 2018-03-19 DIAGNOSIS — T14.90XA INJURY: Primary | ICD-10-CM

## 2018-03-19 DIAGNOSIS — T14.90XA INJURY: ICD-10-CM

## 2018-03-19 PROCEDURE — 99283 EMERGENCY DEPT VISIT LOW MDM: CPT | Performed by: PREVENTIVE MEDICINE

## 2018-03-19 PROCEDURE — G0382 LEV 3 HOSP TYPE B ED VISIT: HCPCS | Performed by: PREVENTIVE MEDICINE

## 2018-03-19 PROCEDURE — 73060 X-RAY EXAM OF HUMERUS: CPT

## 2018-03-22 ENCOUNTER — APPOINTMENT (OUTPATIENT)
Dept: URGENT CARE | Age: 63
End: 2018-03-22
Payer: OTHER MISCELLANEOUS

## 2018-03-22 PROCEDURE — 99213 OFFICE O/P EST LOW 20 MIN: CPT | Performed by: PREVENTIVE MEDICINE

## 2018-04-13 ENCOUNTER — TRANSCRIBE ORDERS (OUTPATIENT)
Dept: URGENT CARE | Age: 63
End: 2018-04-13

## 2018-04-13 ENCOUNTER — APPOINTMENT (OUTPATIENT)
Dept: URGENT CARE | Age: 63
End: 2018-04-13
Payer: OTHER MISCELLANEOUS

## 2018-04-13 ENCOUNTER — APPOINTMENT (OUTPATIENT)
Dept: RADIOLOGY | Age: 63
End: 2018-04-13
Payer: OTHER MISCELLANEOUS
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
## 2018-04-13 PROCEDURE — G0383 LEV 4 HOSP TYPE B ED VISIT: HCPCS | Performed by: PREVENTIVE MEDICINE

## 2018-04-13 PROCEDURE — 73030 X-RAY EXAM OF SHOULDER: CPT

## 2018-04-13 PROCEDURE — 73060 X-RAY EXAM OF HUMERUS: CPT

## 2018-04-13 PROCEDURE — 99284 EMERGENCY DEPT VISIT MOD MDM: CPT | Performed by: PREVENTIVE MEDICINE

## 2018-04-17 ENCOUNTER — APPOINTMENT (OUTPATIENT)
Dept: URGENT CARE | Age: 63
End: 2018-04-17
Payer: OTHER MISCELLANEOUS

## 2018-04-17 PROCEDURE — 99213 OFFICE O/P EST LOW 20 MIN: CPT | Performed by: PREVENTIVE MEDICINE

## 2018-05-09 ENCOUNTER — APPOINTMENT (OUTPATIENT)
Dept: RADIOLOGY | Facility: OTHER | Age: 63
End: 2018-05-09
Payer: COMMERCIAL

## 2018-05-09 ENCOUNTER — OFFICE VISIT (OUTPATIENT)
Dept: OBGYN CLINIC | Facility: OTHER | Age: 63
End: 2018-05-09
Payer: COMMERCIAL

## 2018-05-09 VITALS
HEIGHT: 67 IN | HEART RATE: 88 BPM | BODY MASS INDEX: 24.33 KG/M2 | DIASTOLIC BLOOD PRESSURE: 65 MMHG | SYSTOLIC BLOOD PRESSURE: 99 MMHG | WEIGHT: 155 LBS

## 2018-05-09 DIAGNOSIS — M25.562 ACUTE PAIN OF LEFT KNEE: ICD-10-CM

## 2018-05-09 DIAGNOSIS — M25.562 ACUTE PAIN OF LEFT KNEE: Primary | ICD-10-CM

## 2018-05-09 DIAGNOSIS — Z01.89 ENCOUNTER FOR LOWER EXTREMITY COMPARISON IMAGING STUDY: ICD-10-CM

## 2018-05-09 PROCEDURE — 73564 X-RAY EXAM KNEE 4 OR MORE: CPT

## 2018-05-09 PROCEDURE — 99204 OFFICE O/P NEW MOD 45 MIN: CPT | Performed by: ORTHOPAEDIC SURGERY

## 2018-05-09 PROCEDURE — 73562 X-RAY EXAM OF KNEE 3: CPT

## 2018-05-09 NOTE — PROGRESS NOTES
Orthopaedic Surgery - Office Note  Nancy Amos (31 y o  male)   : 1955   MRN: [id number]  Encounter Date: 2018    Chief Complaint   Patient presents with    Left Knee - Pain       Assessment / Plan  Left knee pain and mechanical symptoms, concerning for medial meniscus tear    · MRI of left knee  · Activity as tolerated  · Anti-inflammatories or Tylenol prn pain  Return for F/U after MRI  History of Present Illness  Nancy Amos is a 60 y o  male who presents for evaluation for left anterior knee pain, worse over the past 2 weeks  There was no specific injury  He has a remote history of a knee injury, which was not a fracture, further details are unclear  He currently has pain radiating to the posterior calf  Denies groin or thigh pain  Denies numbness in the LLE  Denies back pain  Denies swelling or locking or giving way  Review of Systems  Pertinent items are noted in HPI  All other systems were reviewed and are negative  Physical Exam  BP 99/65   Pulse 88   Ht 5' 7" (1 702 m)   Wt 70 3 kg (155 lb)   BMI 24 28 kg/m²   Cons: Appears well  No apparent distress  Psych: Alert  Oriented x3  Mood and affect normal   Eyes: PERRLA, EOMI  Resp: Normal effort  No audible wheezing or stridor  CV: Palpable pulse  No discernable arrhythmia  No LE edema  Lymph:  No palpable cervical, axillary, or inguinal lymphadenopathy  Skin: Warm  No palpable masses  No visible lesions  Neuro: Normal muscle tone  Normal and symmetric DTR's  Left Knee Exam  Alignment:  Normal knee alignment  Inspection:  mild swelling  No muscle atrophy  Palpation:  moderate medial and lateral joint line tenderness and calf tenderness tenderness  ROM:  Knee Extension 0  Knee Flexion 125  Strength:  5/5 quadriceps and hamstrings  Stability:  No objective knee instability  Stable Varus / Valgus stress, Lachman, and Posterior drawer  Tests:  (+) Juan  (+) Thessaly    Patella: Patella tracks centrally without crepitus  Neurovascular:  Sensation intact in DP/SP/Nettles/Sa/T nerve distributions  2+ DP & PT pulses  Gait:  Smooth  Studies Reviewed  I have personally reviewed pertinent films in PACS  XR of left knee - mild degenerative changes  no acute fractures    Procedures  No procedures today  Medical, Surgical, Family, and Social History  The patient's medical history, family history, and social history, were reviewed and updated as appropriate  Past Medical History:   Diagnosis Date    Hyperlipidemia     Polymyalgia rheumatica syndrome ([address] )     Psychiatric disorder     anxiety       Past Surgical History:   Procedure Laterality Date    APPENDECTOMY      KIDNEY STONE SURGERY      TONSILLECTOMY      TRANSURETHRAL RESECTION OF PROSTATE         Family History   Problem Relation Age of Onset    Diabetes Mother     Heart attack Father     Prostate cancer Father        Social History     Occupational History    Not on file       Social History Main Topics    Smoking status: Never Smoker    Smokeless tobacco: Never Used    Alcohol use No    Drug use: No    Sexual activity: Not on file       No Known Allergies      Current Outpatient Prescriptions:     clomiPRAMINE (ANAFRANIL) 25 mg capsule, Take 50 mg by mouth daily at bedtime, Disp: , Rfl:     clonazePAM (KlonoPIN) 0 5 mg tablet, Take 0 5 mg by mouth 2 (two) times a day as needed for seizures, Disp: , Rfl:     folic acid (FOLVITE) 1 mg tablet, Take 1 mg by mouth daily, Disp: , Rfl:     lisdexamfetamine (VYVANSE) 30 MG capsule, Take 30 mg by mouth every morning, Disp: , Rfl:     methotrexate 2 5 MG tablet, Take by mouth 5 tablets on Sundays , Disp: , Rfl:     simvastatin (ZOCOR) 5 MG tablet, Take 5 mg by mouth daily, Disp: , Rfl:     vilazodone (VIIBRYD) 10 mg tablet, Take 40 mg by mouth daily with breakfast, Disp: , Rfl:       Leyla Butler MD    Scribe Attestation    I,:    am acting as a scribe while in the presence of the attending physician :        I,:    personally performed the services described in this documentation    as scribed in my presence :

## 2018-05-16 ENCOUNTER — HOSPITAL ENCOUNTER (OUTPATIENT)
Dept: RADIOLOGY | Age: 63
Discharge: HOME | End: 2018-05-16
Payer: COMMERCIAL

## 2018-05-16 DIAGNOSIS — M25.562 ACUTE PAIN OF LEFT KNEE: ICD-10-CM

## 2018-05-16 PROCEDURE — 73721 MRI JNT OF LWR EXTRE W/O DYE: CPT

## 2018-05-23 ENCOUNTER — OFFICE VISIT (OUTPATIENT)
Dept: OBGYN CLINIC | Facility: OTHER | Age: 63
End: 2018-05-23
Payer: COMMERCIAL

## 2018-05-23 VITALS
DIASTOLIC BLOOD PRESSURE: 72 MMHG | SYSTOLIC BLOOD PRESSURE: 108 MMHG | HEART RATE: 99 BPM | HEIGHT: 67 IN | BODY MASS INDEX: 25.11 KG/M2 | WEIGHT: 160 LBS

## 2018-05-23 DIAGNOSIS — M25.562 ACUTE PAIN OF LEFT KNEE: Primary | ICD-10-CM

## 2018-05-23 PROCEDURE — 99213 OFFICE O/P EST LOW 20 MIN: CPT | Performed by: ORTHOPAEDIC SURGERY

## 2018-05-23 PROCEDURE — 20610 DRAIN/INJ JOINT/BURSA W/O US: CPT | Performed by: ORTHOPAEDIC SURGERY

## 2018-05-23 RX ORDER — BUPIVACAINE HYDROCHLORIDE 2.5 MG/ML
4 INJECTION, SOLUTION INFILTRATION; PERINEURAL
Status: COMPLETED | OUTPATIENT
Start: 2018-05-23 | End: 2018-05-23

## 2018-05-23 RX ORDER — METHYLPREDNISOLONE ACETATE 40 MG/ML
1 INJECTION, SUSPENSION INTRA-ARTICULAR; INTRALESIONAL; INTRAMUSCULAR; SOFT TISSUE
Status: COMPLETED | OUTPATIENT
Start: 2018-05-23 | End: 2018-05-23

## 2018-05-23 RX ADMIN — METHYLPREDNISOLONE ACETATE 1 ML: 40 INJECTION, SUSPENSION INTRA-ARTICULAR; INTRALESIONAL; INTRAMUSCULAR; SOFT TISSUE at 16:05

## 2018-05-23 RX ADMIN — BUPIVACAINE HYDROCHLORIDE 4 ML: 2.5 INJECTION, SOLUTION INFILTRATION; PERINEURAL at 16:05

## 2018-05-23 NOTE — PROGRESS NOTES
Orthopaedic Surgery - Office Note  Maryanne Reardon (39 y o  male)   : 1955   MRN: [id number]  Encounter Date: 2018    Chief Complaint   Patient presents with    Left Knee - Follow-up       Assessment / Plan  Left stable MMT and mild OA    · MRI was reviewed with the patient  · CSI given into left knee  · HEP reviewed  · Activity as tolerated  · Anti-inflammatories or Tylenol prn pain  Return if symptoms worsen or fail to improve  History of Present Illness  Maryanne Reardon is a 60 y o  male who presents for follow up of left anterior knee pain, present for about a month  There was no specific injury  He has a remote history of a knee injury, which was not a fracture, further details are unclear  Since the last visit, he has had significant improvements  He has not really been modifying activity or treating in any particular way  He is here to review his MRI  Review of Systems  Pertinent items are noted in HPI  All other systems were reviewed and are negative  Physical Exam  /72   Pulse 99   Ht 5' 7" (1 702 m)   Wt 72 6 kg (160 lb)   BMI 25 06 kg/m²   Cons: Appears well  No apparent distress  Psych: Alert  Oriented x3  Mood and affect normal   Eyes: PERRLA, EOMI  Resp: Normal effort  No audible wheezing or stridor  CV: Palpable pulse  No discernable arrhythmia  No LE edema  Lymph:  No palpable cervical, axillary, or inguinal lymphadenopathy  Skin: Warm  No palpable masses  No visible lesions  Neuro: Normal muscle tone  Normal and symmetric DTR's  Left Knee Exam  Alignment:  Normal knee alignment  Inspection:  mild swelling  No muscle atrophy  Palpation:  moderate medial and lateral joint line tenderness and calf tenderness tenderness  ROM:  Knee Extension 0  Knee Flexion 125  Strength:  5/5 quadriceps and hamstrings  Stability:  No objective knee instability  Stable Varus / Valgus stress, Lachman, and Posterior drawer  Tests:  (+) Juan   (+) Thessaly  Patella:  Patella tracks centrally without crepitus  Neurovascular:  Sensation intact in DP/SP/Nettles/Sa/T nerve distributions  2+ DP & PT pulses  Gait:  Smooth  Studies Reviewed  I have personally reviewed pertinent films in PACS  MRI of left knee - small stable appearing medial meniscus tear, popliteus myotendinous junction tear, grade 1 MCL sprain    Large joint arthrocentesis  Date/Time: 5/23/2018 4:05 PM  Consent given by: patient  Supporting Documentation  Indications: pain   Procedure Details  Location: knee - L knee  Preparation: Alcohol  Needle size: 22 G  Approach: superolateral   Medications administered: 4 mL bupivacaine 0 25 %; 1 mL methylPREDNISolone acetate 40 mg/mL    Patient tolerance: patient tolerated the procedure well with no immediate complications  Dressing:  Sterile dressing applied           Medical, Surgical, Family, and Social History  The patient's medical history, family history, and social history, were reviewed and updated as appropriate  Past Medical History:   Diagnosis Date    Hyperlipidemia     [address]yalgia rhe[address]a syndrome ([facility] [address]     Psychiatric disorder     anxiety       Past Surgical History:   Procedure Laterality Date    APPENDECTOMY      KIDNEY STONE SURGERY      TONSILLECTOMY      TRANSURETHRAL RESECTION OF PROSTATE         Family History   Problem Relation Age of Onset    Diabetes Mother     Heart attack Father     Prostate cancer Father        Social History     Occupational History    Not on file       Social History Main Topics    Smoking status: Never Smoker    Smokeless tobacco: Never Used    Alcohol use No    Drug use: No    Sexual activity: Not on file       No Known Allergies      Current Outpatient Prescriptions:     clomiPRAMINE (ANAFRANIL) 25 mg capsule, Take 50 mg by mouth daily at bedtime, Disp: , Rfl:     clonazePAM (KlonoPIN) 0 5 mg tablet, Take 0 5 mg by mouth 2 (two) times a day as needed for seizures, Disp: , Rfl:    folic acid (FOLVITE) 1 mg tablet, Take 1 mg by mouth daily, Disp: , Rfl:     lisdexamfetamine (VYVANSE) 30 MG capsule, Take 30 mg by mouth every morning, Disp: , Rfl:     methotrexate 2 5 MG tablet, Take by mouth 5 tablets on Sundays , Disp: , Rfl:     simvastatin (ZOCOR) 5 MG tablet, Take 5 mg by mouth daily, Disp: , Rfl:     vilazodone (VIIBRYD) 10 mg tablet, Take 40 mg by mouth daily with breakfast, Disp: , Rfl:       Laree Brunner, MD    Scribe Attestation    I,:    am acting as a scribe while in the presence of the attending physician :        I,:    personally performed the services described in this documentation    as scribed in my presence :

## 2018-06-01 ENCOUNTER — TRANSCRIBE ORDERS (OUTPATIENT)
Dept: ADMINISTRATIVE | Age: 63
End: 2018-06-01

## 2018-06-01 ENCOUNTER — APPOINTMENT (OUTPATIENT)
Dept: LAB | Age: 63
End: 2018-06-01
Payer: COMMERCIAL

## 2018-06-01 DIAGNOSIS — N13.8 ENLARGED PROSTATE WITH URINARY OBSTRUCTION: ICD-10-CM

## 2018-06-01 DIAGNOSIS — N13.8 ENLARGED PROSTATE WITH URINARY OBSTRUCTION: Primary | ICD-10-CM

## 2018-06-01 DIAGNOSIS — N40.1 ENLARGED PROSTATE WITH URINARY OBSTRUCTION: ICD-10-CM

## 2018-06-01 DIAGNOSIS — N40.1 ENLARGED PROSTATE WITH URINARY OBSTRUCTION: Primary | ICD-10-CM

## 2018-06-01 PROCEDURE — 84153 ASSAY OF PSA TOTAL: CPT

## 2018-06-02 LAB — PSA SERPL-MCNC: 1.7 NG/ML (ref 0–4)

## 2018-06-04 ENCOUNTER — OFFICE VISIT (OUTPATIENT)
Dept: UROLOGY | Facility: CLINIC | Age: 63
End: 2018-06-04
Payer: COMMERCIAL

## 2018-06-04 VITALS — BODY MASS INDEX: 26.68 KG/M2 | HEIGHT: 67 IN | WEIGHT: 170 LBS

## 2018-06-04 DIAGNOSIS — N40.1 BPH WITH OBSTRUCTION/LOWER URINARY TRACT SYMPTOMS: Primary | ICD-10-CM

## 2018-06-04 DIAGNOSIS — N13.8 BPH WITH OBSTRUCTION/LOWER URINARY TRACT SYMPTOMS: Primary | ICD-10-CM

## 2018-06-04 LAB
SL AMB  POCT GLUCOSE, UA: NORMAL
SL AMB LEUKOCYTE ESTERASE,UA: NORMAL
SL AMB POCT BILIRUBIN,UA: NORMAL
SL AMB POCT BLOOD,UA: NORMAL
SL AMB POCT CLARITY,UA: CLEAR
SL AMB POCT COLOR,UA: YELLOW
SL AMB POCT KETONES,UA: NORMAL
SL AMB POCT NITRITE,UA: NORMAL
SL AMB POCT PH,UA: 5
SL AMB POCT SPECIFIC GRAVITY,UA: NORMAL
SL AMB POCT URINE PROTEIN: NORMAL
SL AMB POCT UROBILINOGEN: NORMAL

## 2018-06-04 PROCEDURE — 81002 URINALYSIS NONAUTO W/O SCOPE: CPT | Performed by: PHYSICIAN ASSISTANT

## 2018-06-04 PROCEDURE — 99213 OFFICE O/P EST LOW 20 MIN: CPT | Performed by: PHYSICIAN ASSISTANT

## 2018-06-04 NOTE — PROGRESS NOTES
UROLOGY PROGRESS NOTE   Patient Identifiers: Roger Rodriguez (MRN [id number])  Date of Service: 6/4/2018    Subjective:    60year old male hx of BPH  PSA 1 7  He had a TURP in 2014  Urine is clear  Patient has  no complaints  Objective:     VITALS:    There were no vitals filed for this visit  LABS:  Lab Results   Component Value Date    HGB 14 6 02/03/2018    HCT 43 0 02/03/2018    WBC 4 87 02/03/2018     02/03/2018   ]    Lab Results   Component Value Date     02/03/2018    K 3 9 02/03/2018     02/03/2018    CO2 28 02/03/2018    BUN 16 02/03/2018    CREATININE 0 99 02/03/2018    CALCIUM 9 1 02/03/2018    GLUCOSE 107 08/10/2017   ]    INPATIENT MEDS:    Current Outpatient Prescriptions:     clomiPRAMINE (ANAFRANIL) 25 mg capsule, Take 50 mg by mouth daily at bedtime, Disp: , Rfl:     clonazePAM (KlonoPIN) 0 5 mg tablet, Take 0 5 mg by mouth 2 (two) times a day as needed for seizures, Disp: , Rfl:     folic acid (FOLVITE) 1 mg tablet, Take 1 mg by mouth daily, Disp: , Rfl:     lisdexamfetamine (VYVANSE) 30 MG capsule, Take 30 mg by mouth every morning, Disp: , Rfl:     methotrexate 2 5 MG tablet, Take by mouth 5 tablets on Sundays , Disp: , Rfl:     simvastatin (ZOCOR) 5 MG tablet, Take 5 mg by mouth daily, Disp: , Rfl:     vilazodone (VIIBRYD) 10 mg tablet, Take 40 mg by mouth daily with breakfast, Disp: , Rfl:       Physical Exam:   Ht 5' 7" (1 702 m)   Wt 77 1 kg (170 lb)   BMI 26 63 kg/m²   GEN: no acute distress    RESP: breathing comfortably with no accessory muscle use    ABD: soft, non-tender, non-distended   INCISION:    EXT: no significant peripheral edema   (Male): Penis circumcised, phallus normal, meatus patent  Testicles descended into scrotum bilaterally without masses nor tenderness  No inguinal hernias bilaterally  VIVIAN: Prostate is not enlarged at 30 grams  The prostate is not boggy  The prostate is not tender    No nodules noted    RADIOLOGY: none     Assessment:   1   BPH     Plan:   - one year follow up PSA prior  -  -  -

## 2018-08-08 ENCOUNTER — OFFICE VISIT (OUTPATIENT)
Dept: OBGYN CLINIC | Facility: OTHER | Age: 63
End: 2018-08-08
Payer: COMMERCIAL

## 2018-08-08 VITALS
HEART RATE: 114 BPM | DIASTOLIC BLOOD PRESSURE: 67 MMHG | BODY MASS INDEX: 23.83 KG/M2 | WEIGHT: 151.8 LBS | HEIGHT: 67 IN | SYSTOLIC BLOOD PRESSURE: 96 MMHG

## 2018-08-08 DIAGNOSIS — S83.242D ACUTE MEDIAL MENISCAL TEAR, LEFT, SUBSEQUENT ENCOUNTER: Primary | ICD-10-CM

## 2018-08-08 DIAGNOSIS — M17.12 PRIMARY OSTEOARTHRITIS OF LEFT KNEE: ICD-10-CM

## 2018-08-08 PROCEDURE — 99214 OFFICE O/P EST MOD 30 MIN: CPT | Performed by: ORTHOPAEDIC SURGERY

## 2018-08-08 PROCEDURE — 20610 DRAIN/INJ JOINT/BURSA W/O US: CPT | Performed by: ORTHOPAEDIC SURGERY

## 2018-08-08 RX ORDER — BUPIVACAINE HYDROCHLORIDE 2.5 MG/ML
4 INJECTION, SOLUTION INFILTRATION; PERINEURAL
Status: COMPLETED | OUTPATIENT
Start: 2018-08-08 | End: 2018-08-08

## 2018-08-08 RX ORDER — METHYLPREDNISOLONE ACETATE 40 MG/ML
1 INJECTION, SUSPENSION INTRA-ARTICULAR; INTRALESIONAL; INTRAMUSCULAR; SOFT TISSUE
Status: COMPLETED | OUTPATIENT
Start: 2018-08-08 | End: 2018-08-08

## 2018-08-08 RX ADMIN — BUPIVACAINE HYDROCHLORIDE 4 ML: 2.5 INJECTION, SOLUTION INFILTRATION; PERINEURAL at 15:08

## 2018-08-08 RX ADMIN — METHYLPREDNISOLONE ACETATE 1 ML: 40 INJECTION, SUSPENSION INTRA-ARTICULAR; INTRALESIONAL; INTRAMUSCULAR; SOFT TISSUE at 15:08

## 2018-08-08 NOTE — PROGRESS NOTES
Orthopaedic Surgery - Office Note  Celia Nunez (78 y o  male)   : 1955   MRN: [id number]  Encounter Date: 2018    Chief Complaint   Patient presents with    Left Knee - Follow-up       Assessment / Plan  Left knee MMT    · CSI of left knee joint was performed  · Activity as tolerated   · Conservative vs surgical treatment options discussed with the patient  Patient will consider surgery for 2018  Return in about 2 months (around 10/8/2018)  History of Present Illness  Celia Nunez is a 60 y o  male who presents for follow up of left knee pain  Patient has a known medial meniscal tear and mild OA seen on MRI  He received a cortisone injection at the last visit on 18 and which helped until about 1 wk ago (1st wk of Aug 2018)  He admits he has not been doing his HEP as instructed since he was doing well  He complains of increased medial knee pain with stairs and climbing ladders which he does often at work  He states the pain is tolerable with normal weightbearing activities  Review of Systems  Pertinent items are noted in HPI  All other systems were reviewed and are negative  Physical Exam  BP 96/67   Pulse (!) 114   Ht 5' 7" (1 702 m)   Wt 68 9 kg (151 lb 12 8 oz)   BMI 23 78 kg/m²   Cons: Appears well  No apparent distress  Psych: Alert  Oriented x3  Mood and affect normal   Eyes: PERRLA, EOMI  Resp: Normal effort  No audible wheezing or stridor  CV: Palpable pulse  No discernable arrhythmia  No LE edema  Lymph:  No palpable cervical, axillary, or inguinal lymphadenopathy  Skin: Warm  No palpable masses  No visible lesions  Neuro: Normal muscle tone  Normal and symmetric DTR's  Left Knee Exam  Alignment:  Normal knee alignment  Inspection:  No edema  No erythema  No ecchymosis  No muscle atrophy  Palpation:  Tenderness at the medial joint line and medial retinaculum  ROM:  Knee Extension 0  Knee Flexion 130    Strength:  5/5 hip flexors and abductors  5/5 quadriceps and hamstrings  Stability:  No objective knee instability  Stable Varus / Valgus stress, Lachman, and Posterior drawer  Tests:  (+) Juan  Patella:  Patella tracks centrally without crepitus  Neurovascular:  Sensation intact in DP/SP/Nettles/Sa/T nerve distributions  Palpable DP & PT pulses  Gait:  Normal     Studies Reviewed  MRI of left knee - Medial meniscal tear         Large joint arthrocentesis  Date/Time: 8/8/2018 3:08 PM  Consent given by: parent  Site marked: site marked  Timeout: Immediately prior to procedure a time out was called to verify the correct patient, procedure, equipment, support staff and site/side marked as required   Supporting Documentation  Indications: pain   Procedure Details  Location: knee - L knee  Needle size: 22 G  Approach: lateral  Medications administered: 4 mL bupivacaine 0 25 %; 1 mL methylPREDNISolone acetate 40 mg/mL    Patient tolerance: patient tolerated the procedure well with no immediate complications  Dressing:  Sterile dressing applied           Medical, Surgical, Family, and Social History  The patient's medical history, family history, and social history, were reviewed and updated as appropriate  Past Medical History:   Diagnosis Date    Hyperlipidemia     Polymyalgia rheumatica syndrome ([facility] [address] )     Psychiatric disorder     anxiety       Past Surgical History:   Procedure Laterality Date    APPENDECTOMY      KIDNEY STONE SURGERY      TONSILLECTOMY      TRANSURETHRAL RESECTION OF PROSTATE         Family History   Problem Relation Age of Onset    Diabetes Mother     Heart attack Father     Prostate cancer Father        Social History     Occupational History    Not on file       Social History Main Topics    Smoking status: Never Smoker    Smokeless tobacco: Never Used    Alcohol use No    Drug use: No    Sexual activity: Not on file       No Known Allergies      Current Outpatient Prescriptions:    clomiPRAMINE (ANAFRANIL) 25 mg capsule, Take 50 mg by mouth daily at bedtime, Disp: , Rfl:     clonazePAM (KlonoPIN) 0 5 mg tablet, Take 0 5 mg by mouth 2 (two) times a day as needed for seizures, Disp: , Rfl:     folic acid (FOLVITE) 1 mg tablet, Take 1 mg by mouth daily, Disp: , Rfl:     lisdexamfetamine (VYVANSE) 30 MG capsule, Take 30 mg by mouth every morning, Disp: , Rfl:     methotrexate 2 5 MG tablet, Take by mouth 5 tablets on Sundays , Disp: , Rfl:     simvastatin (ZOCOR) 5 MG tablet, Take 5 mg by mouth daily, Disp: , Rfl:     vilazodone (VIIBRYD) 20 mg tablet, Take 20 mg by mouth daily with breakfast  , Disp: , Rfl:       Musa Guzman    I,:   Merlin Call am acting as a scribe while in the presence of the attending physician :        I,:   Yessi Roth MD personally performed the services described in this documentation    as scribed in my presence :

## 2018-09-10 ENCOUNTER — HOSPITAL ENCOUNTER (EMERGENCY)
Facility: HOSPITAL | Age: 63
Discharge: HOME | End: 2018-09-10
Attending: EMERGENCY MEDICINE | Admitting: EMERGENCY MEDICINE
Payer: OTHER MISCELLANEOUS

## 2018-09-10 ENCOUNTER — APPOINTMENT (EMERGENCY)
Dept: RADIOLOGY | Facility: HOSPITAL | Age: 63
End: 2018-09-10
Payer: OTHER MISCELLANEOUS

## 2018-09-10 ENCOUNTER — APPOINTMENT (OUTPATIENT)
Dept: URGENT CARE | Age: 63
End: 2018-09-10
Payer: OTHER MISCELLANEOUS

## 2018-09-10 VITALS
DIASTOLIC BLOOD PRESSURE: 73 MMHG | SYSTOLIC BLOOD PRESSURE: 120 MMHG | HEART RATE: 101 BPM | RESPIRATION RATE: 19 BRPM | TEMPERATURE: 97.9 F | OXYGEN SATURATION: 96 %

## 2018-09-10 DIAGNOSIS — S61.012A THUMB LACERATION, LEFT, INITIAL ENCOUNTER: Primary | ICD-10-CM

## 2018-09-10 PROCEDURE — 99284 EMERGENCY DEPT VISIT MOD MDM: CPT | Performed by: PREVENTIVE MEDICINE

## 2018-09-10 PROCEDURE — 99284 EMERGENCY DEPT VISIT MOD MDM: CPT

## 2018-09-10 PROCEDURE — G0383 LEV 4 HOSP TYPE B ED VISIT: HCPCS | Performed by: PREVENTIVE MEDICINE

## 2018-09-10 PROCEDURE — 73130 X-RAY EXAM OF HAND: CPT

## 2018-09-10 RX ORDER — HEPARIN SODIUM 1000 [USP'U]/ML
5200 INJECTION, SOLUTION INTRAVENOUS; SUBCUTANEOUS AS NEEDED
Status: DISCONTINUED | OUTPATIENT
Start: 2018-09-10 | End: 2018-09-10

## 2018-09-10 RX ORDER — HEPARIN SODIUM 10000 [USP'U]/100ML
3-30 INJECTION, SOLUTION INTRAVENOUS
Status: DISCONTINUED | OUTPATIENT
Start: 2018-09-10 | End: 2018-09-10

## 2018-09-10 RX ORDER — IBUPROFEN 600 MG/1
600 TABLET ORAL EVERY 6 HOURS PRN
Qty: 30 TABLET | Refills: 0 | Status: SHIPPED | OUTPATIENT
Start: 2018-09-10 | End: 2019-02-26

## 2018-09-10 RX ORDER — HEPARIN SODIUM 1000 [USP'U]/ML
2600 INJECTION, SOLUTION INTRAVENOUS; SUBCUTANEOUS AS NEEDED
Status: DISCONTINUED | OUTPATIENT
Start: 2018-09-10 | End: 2018-09-10

## 2018-09-10 RX ORDER — HEPARIN SODIUM 1000 [USP'U]/ML
5200 INJECTION, SOLUTION INTRAVENOUS; SUBCUTANEOUS ONCE
Status: DISCONTINUED | OUTPATIENT
Start: 2018-09-10 | End: 2018-09-10

## 2018-09-10 NOTE — ED PROVIDER NOTES
History  Chief Complaint   Patient presents with    Thumb Injury     pt sent from [place] [address]  Pt was working in workshop had 4 in plate hit his L thumb  Pt denies numbness and tingling after event  Pt moving extremity without difficulty  58-year-old male a past medical history presents to  The emergency department  After being evaluated by [place] [address] [facility]  Patient states he is at work   Drilling through a steel plate when the drill bit spun still plate spine around cutting his left thumb  Patient's thumb was provided a nerve block at [place] [address] the patient is currently in no pain  Hemostasis was controlled the patient had arrived via EMS  Patient's fingers currently dressed in a wrap  Patient denies decreased extension or flexion of the extremity  Patient has no other complaints            Prior to Admission Medications   Prescriptions Last Dose Informant Patient Reported? Taking?    clomiPRAMINE (ANAFRANIL) 25 mg capsule 9/9/2018 at Unknown time  Yes Yes   Sig: Take 50 mg by mouth daily at bedtime   clonazePAM (KlonoPIN) 0 5 mg tablet 9/10/2018 at Unknown time  Yes Yes   Sig: Take 0 5 mg by mouth 2 (two) times a day as needed for seizures   folic acid (FOLVITE) 1 mg tablet 9/10/2018 at Unknown time  Yes Yes   Sig: Take 1 mg by mouth daily   lisdexamfetamine (VYVANSE) 30 MG capsule 9/10/2018 at Unknown time  Yes Yes   Sig: Take 30 mg by mouth every morning   methotrexate 2 5 MG tablet 9/9/2018 at Unknown time  Yes Yes   Sig: Take by mouth 5 tablets on Sundays    simvastatin (ZOCOR) 5 MG tablet 9/9/2018 at Unknown time  Yes Yes   Sig: Take 5 mg by mouth daily   vilazodone (VIIBRYD) 20 mg tablet 9/10/2018 at Unknown time  Yes Yes   Sig: Take 20 mg by mouth daily with breakfast        Facility-Administered Medications: None       Past Medical History:   Diagnosis Date    Hyperlipidemia     [facility]a [facility] [address] [facility]     Psychiatric disorder     anxiety       Past Surgical History:   Procedure Laterality Date    APPENDECTOMY      KIDNEY STONE SURGERY      TONSILLECTOMY      TRANSURETHRAL RESECTION OF PROSTATE         Family History   Problem Relation Age of Onset    Diabetes Mother     Heart attack Father     Prostate cancer Father      I have reviewed and agree with the history as documented  Social History   Substance Use Topics    Smoking status: Never Smoker    Smokeless tobacco: Never Used    Alcohol use No        Review of Systems   Constitutional: Negative for chills, diaphoresis, fatigue and fever  HENT: Negative for congestion, ear discharge, facial swelling, hearing loss, rhinorrhea, sinus pain, sinus pressure, sneezing, sore throat, tinnitus and trouble swallowing  Eyes: Negative for pain, discharge and redness  Respiratory: Negative for cough, choking, chest tightness, shortness of breath, wheezing and stridor  Cardiovascular: Negative for chest pain, palpitations and leg swelling  Gastrointestinal: Negative for abdominal distention, abdominal pain, blood in stool, constipation, diarrhea, nausea and vomiting  Endocrine: Negative for cold intolerance, polydipsia and polyuria  Genitourinary: Negative for difficulty urinating, dysuria, enuresis, flank pain, frequency and hematuria  Musculoskeletal: Negative for arthralgias, back pain, gait problem and neck stiffness  Skin: Positive for wound  Negative for rash  Neurological: Negative for dizziness, seizures, syncope, weakness, numbness and headaches  Hematological: Negative for adenopathy  Psychiatric/Behavioral: Negative for agitation, confusion, hallucinations, sleep disturbance and suicidal ideas  All other systems reviewed and are negative        Physical Exam  ED Triage Vitals [09/10/18 1539]   Temperature Pulse Respirations Blood Pressure SpO2   97 9 °F (36 6 °C) 101 19 120/73 96 %      Temp Source Heart Rate Source Patient Position - Orthostatic VS BP Location FiO2 (%) Oral -- Sitting Right arm --      Pain Score       No Pain           Orthostatic Vital Signs  Vitals:    09/10/18 1539   BP: 120/73   Pulse: 101   Patient Position - Orthostatic VS: Sitting       Physical Exam   Constitutional: He is oriented to person, place, and time  He appears well-developed and well-nourished  No distress  HENT:   Head: Normocephalic and atraumatic  Eyes: Conjunctivae and EOM are normal  Pupils are equal, round, and reactive to light  Neck: Normal range of motion  Cardiovascular: Normal rate and regular rhythm  Exam reveals no gallop and no friction rub  No murmur heard  Pulmonary/Chest: Breath sounds normal  No respiratory distress  He has no wheezes  He has no rales  Abdominal: Soft  Normal appearance and bowel sounds are normal  There is no tenderness  There is no rigidity, no rebound, no guarding, no CVA tenderness, no tenderness at McBurney's point and negative Cameron's sign  Musculoskeletal: Normal range of motion  He exhibits tenderness and deformity  Neurological: He is alert and oriented to person, place, and time  No cranial nerve deficit or sensory deficit  GCS eye subscore is 4  GCS verbal subscore is 5  GCS motor subscore is 6  Reflex Scores:       Bicep reflexes are 2+ on the right side and 2+ on the left side  Patellar reflexes are 2+ on the right side and 2+ on the left side  Patient has equal 5/5 strength b/l UE and Le  No focal neuro deficits noted  Skin: Skin is warm and dry  Capillary refill takes less than 2 seconds  He is not diaphoretic  Psychiatric: He has a normal mood and affect  His behavior is normal  Judgment and thought content normal    Nursing note and vitals reviewed        ED Medications  Medications   heparin (porcine) injection 5,200 Units (not administered)   heparin (porcine) 25,000 units in 250 mL infusion (premix) (not administered)   heparin (porcine) injection 5,200 Units (not administered)   heparin (porcine) injection 2,600 Units (not administered)       Diagnostic Studies  Results Reviewed     Procedure Component Value Units Date/Time    APTT [[id number]]     Lab Status:  No result Specimen:  Blood     CBC [[id number]]     Lab Status:  No result Specimen:  Blood     Protime-INR [[id number]]     Lab Status:  No result Specimen:  Blood                  XR hand 3+ views LEFT   Final Result by Nitesh Arteaga DO (09/10 1718)      No fracture  Soft tissue irregularity distal 1st digit consistent with laceration  A few punctate foreign bodies in the tip of the 1st digit suggested  Workstation performed: [id number]               Procedures  Lac Repair  Date/Time: 9/10/2018 5:06 PM  Performed by: Bjorn Baumgarten  Authorized by: Bjorn Baumgarten   Consent: Verbal consent obtained  Risks and benefits: risks, benefits and alternatives were discussed  Consent given by: patient  Patient understanding: patient states understanding of the procedure being performed  Required items: required blood products, implants, devices, and special equipment available  Patient identity confirmed: verbally with patient  Time out: Immediately prior to procedure a "time out" was called to verify the correct patient, procedure, equipment, support staff and site/side marked as required  Body area: upper extremity  Location details: left hand  Laceration length: 3 cm  Contamination: The wound is contaminated  Foreign bodies: metal  Tendon involvement: none  Nerve involvement: none  Vascular damage: no  Anesthesia: digital block (Performed at [facility])    Sedation:  Patient sedated: no    Wound Dehiscence:  Superficial Wound Dehiscence: simple closure      Procedure Details:  Preparation: Patient was prepped and draped in the usual sterile fashion    Irrigation solution: tap water  Amount of cleaning: extensive  Debridement: none  Degree of undermining: none  Skin closure: 4-0 nylon  Subcutaneous closure: 5-0 Vicryl  Number of sutures: 8  Technique: simple  Approximation: close  Approximation difficulty: simple  Dressing: 4x4 sterile gauze and non-adhesive packing strip  Patient tolerance: Patient tolerated the procedure well with no immediate complications            Phone Consults  ED Phone Contact    ED Course                               MDM  Number of Diagnoses or Management Options  Thumb laceration, left, initial encounter: new and requires workup  Diagnosis management comments:  76-year-old male with left thumb laceration  Repaired in the emergency department see procedure not  E     1    Left thumb laceration  - repaired   -Motrin for pain  -Follow up with Hand surgery as well as PCP  CritCare Time    Disposition  Final diagnoses:   Thumb laceration, left, initial encounter     Time reflects when diagnosis was documented in both MDM as applicable and the Disposition within this note     Time User Action Codes Description Comment    9/10/2018  4:58 PM Nato Ann Add [[id number]] Thumb laceration, left, initial encounter       ED Disposition     ED Disposition Condition Comment    Discharge  Nikolai Vivas discharge to home/self care      Condition at discharge: Good        Follow-up Information     Follow up With Specialties Details Why Contact Info Additional [address] [facility] Surgery Call in 1 day  Krysten Crow [address] MD Internal Medicine   Nitin Nolen 9 [address] Bib[address] Houser [address] Emergency Department Emergency Medicine  If symptoms worsen [address]  [phone]  ED, [address] [place] [address], [address]          Patient's Medications   Discharge Prescriptions    IBUPROFEN (MOTRIN) 600 MG TABLET    Take 1 tablet (600 mg total) by mouth every 6 (six) hours as needed for moderate pain       Start Date: 9/10/2018 End Date: -- Order Dose: 600 mg       Quantity: 30 tablet    Refills: 0     No discharge procedures on file  ED Provider  Attending physically available and evaluated Kev Nguyen I managed the patient along with the ED Attending      Electronically Signed by         Janice Barr DO  09/10/18 4457

## 2018-09-10 NOTE — ED ATTENDING ATTESTATION
Maykel Rushing MD, saw and evaluated the patient  I have discussed the patient with the resident/non-physician practitioner and agree with the resident's/non-physician practitioner's findings, Plan of Care, and MDM as documented in the resident's/non-physician practitioner's note, except where noted  All available labs and Radiology studies were reviewed  At this point I agree with the current assessment done in the Emergency Department  I have conducted an independent evaluation of this patient a history and physical is as follows:      Critical Care Time  CritCare Time    Procedures     61 yo male with left thumb injury while drilling through plate and had laceration of distal thumb  No previous injury, tetanus utd  Vss, afebrile, lungs cta, rrr, 3 cm deep laceration, nvi  Xray, suture

## 2018-09-10 NOTE — DISCHARGE INSTRUCTIONS
Finger Laceration   WHAT YOU NEED TO KNOW:   A finger laceration is a deep cut in your skin  It is often caused by a sharp object, such as a knife, or blunt force to your finger  Your blood vessels, bones, joints, tendons, or nerves may also be injured  DISCHARGE INSTRUCTIONS:   Return to the emergency department if:   · Your wound comes apart  · Blood soaks through your bandage  · You have severe pain in your finger or hand  · Your finger is pale and cold  · You have sudden trouble moving your finger  · Your swelling suddenly gets worse  · You have red streaks on your skin coming from your wound  Contact your healthcare provider or hand specialist if:   · You have new numbness or tingling  · Your finger feels warm, looks swollen or red, and is draining pus  · You have a fever  · You have questions or concerns about your condition or care  Medicines: You may  need any of the following:  · Antibiotics  help prevent a bacterial infection  · Acetaminophen  decreases pain and fever  It is available without a doctor's order  Ask how much to take and how often to take it  Follow directions  Read the labels of all other medicines you are using to see if they also contain acetaminophen, or ask your doctor or pharmacist  Acetaminophen can cause liver damage if not taken correctly  Do not use more than 4 grams (4,000 milligrams) total of acetaminophen in one day  · Prescription pain medicine  may be given  Ask your healthcare provider how to take this medicine safely  Some prescription pain medicines contain acetaminophen  Do not take other medicines that contain acetaminophen without talking to your healthcare provider  Too much acetaminophen may cause liver damage  Prescription pain medicine may cause constipation  Ask your healthcare provider how to prevent or treat constipation  · Take your medicine as directed    Contact your healthcare provider if you think your medicine is not helping or if you have side effects  Tell him or her if you are allergic to any medicine  Keep a list of the medicines, vitamins, and herbs you take  Include the amounts, and when and why you take them  Bring the list or the pill bottles to follow-up visits  Carry your medicine list with you in case of an emergency  Self-care:   · Apply ice  on your finger for 15 to 20 minutes every hour or as directed  Use an ice pack, or put crushed ice in a plastic bag  Cover it with a towel before you apply it to your skin  Ice helps prevent tissue damage and decreases swelling and pain  · Elevate  your hand above the level of your heart as often as you can  This will help decrease swelling and pain  Prop your hand on pillows or blankets to keep it elevated comfortably  · Wear your splint as directed  A splint will decrease movement and stress on your wound  The splint may help your wound heal faster  Ask your healthcare provider how to apply and remove a splint  · Apply ointments to decrease scarring  Do not apply ointments until your healthcare provider says it is okay  You may need to wait until your wound is healed  Ask which ointment to buy and how often to use it  Wound care:   · Do not get your wound wet until your healthcare provider says it is okay  Do not soak your hand in water  Do not go swimming until your healthcare provider says it is okay  When your healthcare provider says it is okay, carefully wash around the wound with soap and water  Let soap and water run over your wound  Gently pat the area dry or allow it to air dry  · Change your bandages when they get wet, dirty, or after washing  Apply new, clean bandages as directed  Do not apply elastic bandages or tape too tightly  Do not put powders or lotions on your wound  · Apply antibiotic ointment as directed  Your healthcare provider may give you antibiotic ointment to put over your wound if you have stitches   If you have Strips-Strips over your wound, let them dry up and fall off on their own  If they do not fall off within 14 days, gently remove them  If you have glue over your wound, do not remove or pick at it  If your glue comes off, do not replace it with glue that you have at home  · Check your wound every day for signs of infection  Signs of infection include swelling, redness, or pus  Follow up with your healthcare provider or hand specialist in 2 days:  Write down your questions so you remember to ask them during your visits  © 2017 [employer]  Information is for End User's use only and may not be sold, redistributed or otherwise used for commercial purposes  All illustrations and images included in CareNotes® are the copyrighted property of [employer] , [employer]  or [employer]  The above information is an  only  It is not intended as medical advice for individual conditions or treatments  Talk to your doctor, nurse or pharmacist before following any medical regimen to see if it is safe and effective for you

## 2018-09-12 ENCOUNTER — APPOINTMENT (OUTPATIENT)
Dept: URGENT CARE | Age: 63
End: 2018-09-12
Payer: OTHER MISCELLANEOUS

## 2018-09-12 PROCEDURE — 99214 OFFICE O/P EST MOD 30 MIN: CPT | Performed by: PREVENTIVE MEDICINE

## 2018-10-03 ENCOUNTER — OFFICE VISIT (OUTPATIENT)
Dept: OBGYN CLINIC | Facility: OTHER | Age: 63
End: 2018-10-03
Payer: COMMERCIAL

## 2018-10-03 VITALS
WEIGHT: 158 LBS | DIASTOLIC BLOOD PRESSURE: 77 MMHG | BODY MASS INDEX: 24.8 KG/M2 | HEART RATE: 108 BPM | HEIGHT: 67 IN | SYSTOLIC BLOOD PRESSURE: 118 MMHG

## 2018-10-03 DIAGNOSIS — M25.562 ACUTE PAIN OF LEFT KNEE: Primary | ICD-10-CM

## 2018-10-03 PROCEDURE — 99213 OFFICE O/P EST LOW 20 MIN: CPT | Performed by: ORTHOPAEDIC SURGERY

## 2018-10-03 NOTE — PROGRESS NOTES
Orthopaedic Surgery - Office Note  Molly Correia (1 y o  male)   : 1955   MRN: [id number]  Encounter Date: 10/3/2018    Chief Complaint   Patient presents with    Left Knee - Follow-up       Assessment / Plan  Left knee MMT with mild OA    · nonsurgical and surgical treatment options were reviewed  · I have recommended left knee scope with PMM, given persistence of symptoms despite conservative treatment  · He will consider his options and discuss with his boss  · Activity as tolerated   Return if symptoms worsen or fail to improve  History of Present Illness  Molly Correia is a 60 y o  male who presents for follow up of left knee pain  Patient has a known medial meniscal tear and mild OA seen on MRI  He received a cortisone injection on 18 and which helped until about 2018  He had a second CSI which gave him 2 days of excellent relief  Currently continues to have anteromedial knee pain  He admits he has not been doing his HEP as instructed since he was doing well  He states the pain is tolerable with normal weightbearing activities  Review of Sys tems  Pertinent items are noted in HPI  All other systems were reviewed and are negative  Physical Exam  /77   Pulse (!) 108   Ht 5' 7" (1 702 m)   Wt 71 7 kg (158 lb)   BMI 24 75 kg/m²   Cons: Appears well  No apparent distress  Psych: Alert  Oriented x3  Mood and affect normal   Eyes: PERRLA, EOMI  Resp: Normal effort  No audible wheezing or stridor  CV: Palpable pulse  No discernable arrhythmia  No LE edema  Lymph:  No palpable cervical, axillary, or inguinal lymphadenopathy  Skin: Warm  No palpable masses  No visible lesions  Neuro: Normal muscle tone  Normal and symmetric DTR's  Left Knee Exam  Alignment:  Normal knee alignment  Inspection:  No edema  No erythema  No ecchymosis  No muscle atrophy    Palpation:  Tenderness at the medial joint line and medial retinaculum  ROM:  Knee Extension 0  Knee Flexion 130  Strength:  5/5 hip flexors and abductors  5/5 quadriceps and hamstrings  Stability:  No objective knee instability  Stable Varus / Valgus stress, Lachman, and Posterior drawer  Tests:  (+) Juan  Patella:  Patella tracks centrally without crepitus  Neurovascular:  Sensation intact in DP/SP/Nettles/Sa/T nerve distributions  Palpable DP & PT pulses  Gait:  Normal     Studies Reviewed  No studies to review       Procedures   No procedures today  Medical, Surgical, Family, and Social History  The patient's medical history, family history, and social history, were reviewed and updated as appropriate  Past Medical History:   Diagnosis Date    Hyperlipidemia     Polymyalgia rheumatica syndrome ([facility] [address] )     Psychiatric disorder     anxiety       Past Surgical History:   Procedure Laterality Date    APPENDECTOMY      KIDNEY STONE SURGERY      TONSILLECTOMY      TRANSURETHRAL RESECTION OF PROSTATE         Family History   Problem Relation Age of Onset    Diabetes Mother     Heart attack Father     Prostate cancer Father        Social History     Occupational History    Not on file       Social History Main Topics    Smoking status: Never Smoker    Smokeless tobacco: Never Used    Alcohol use No    Drug use: No    Sexual activity: Not on file       No Known Allergies      Current Outpatient Prescriptions:     clomiPRAMINE (ANAFRANIL) 25 mg capsule, Take 50 mg by mouth daily at bedtime, Disp: , Rfl:     clonazePAM (KlonoPIN) 0 5 mg tablet, Take 0 5 mg by mouth 2 (two) times a day as needed for seizures, Disp: , Rfl:     folic acid (FOLVITE) 1 mg tablet, Take 1 mg by mouth daily, Disp: , Rfl:     ibuprofen (MOTRIN) 600 mg tablet, Take 1 tablet (600 mg total) by mouth every 6 (six) hours as needed for moderate pain, Disp: 30 tablet, Rfl: 0    lisdexamfetamine (VYVANSE) 30 MG capsule, Take 30 mg by mouth every morning, Disp: , Rfl:     methotrexate 2 5 MG tablet, Take by mouth 5 tablets on Sundays , Disp: , Rfl:     simvastatin (ZOCOR) 5 MG tablet, Take 5 mg by mouth daily, Disp: , Rfl:     vilazodone (VIIBRYD) 20 mg tablet, Take 20 mg by mouth daily with breakfast  , Disp: , Rfl:       Rani Perez MD    Scribe Attestation    I,:    am acting as a scribe while in the presence of the attending physician :        I,:    personally performed the services described in this documentation    as scribed in my presence :

## 2019-01-22 ENCOUNTER — OFFICE VISIT (OUTPATIENT)
Dept: OBGYN CLINIC | Facility: CLINIC | Age: 64
End: 2019-01-22
Payer: COMMERCIAL

## 2019-01-22 VITALS
HEART RATE: 96 BPM | WEIGHT: 155 LBS | HEIGHT: 67 IN | DIASTOLIC BLOOD PRESSURE: 73 MMHG | BODY MASS INDEX: 24.33 KG/M2 | SYSTOLIC BLOOD PRESSURE: 116 MMHG

## 2019-01-22 DIAGNOSIS — M75.102 ROTATOR CUFF SYNDROME OF LEFT SHOULDER: Primary | ICD-10-CM

## 2019-01-22 PROCEDURE — 99214 OFFICE O/P EST MOD 30 MIN: CPT | Performed by: ORTHOPAEDIC SURGERY

## 2019-01-22 RX ORDER — MELOXICAM 7.5 MG/1
7.5 TABLET ORAL 2 TIMES DAILY
Qty: 60 TABLET | Refills: 0 | Status: SHIPPED | OUTPATIENT
Start: 2019-01-22 | End: 2019-02-12 | Stop reason: SDUPTHER

## 2019-01-24 ENCOUNTER — TELEPHONE (OUTPATIENT)
Dept: OBGYN CLINIC | Facility: HOSPITAL | Age: 64
End: 2019-01-24

## 2019-01-24 NOTE — TELEPHONE ENCOUNTER
Caller: New DAVIDSON/KENDAL #: [phone]  Dr Malini Ocampo    Patient called to advise he is very pleased with what Dr Malini Ocampo has done for him so far  What he did at his most recent appointment worked and he is very happy  He will see Dr Malini Ocampo for his follow-up appointment as scheduled

## 2019-02-09 ENCOUNTER — APPOINTMENT (OUTPATIENT)
Dept: LAB | Facility: CLINIC | Age: 64
End: 2019-02-09
Payer: COMMERCIAL

## 2019-02-09 ENCOUNTER — TRANSCRIBE ORDERS (OUTPATIENT)
Dept: LAB | Facility: CLINIC | Age: 64
End: 2019-02-09

## 2019-02-09 DIAGNOSIS — M35.3 POLYMYALGIA RHEUMATICA (HCC): Primary | ICD-10-CM

## 2019-02-09 DIAGNOSIS — M35.3 POLYMYALGIA RHEUMATICA (HCC): ICD-10-CM

## 2019-02-09 LAB
ALBUMIN SERPL BCP-MCNC: 3.8 G/DL (ref 3.5–5)
ALP SERPL-CCNC: 95 U/L (ref 46–116)
ALT SERPL W P-5'-P-CCNC: 46 U/L (ref 12–78)
ANION GAP SERPL CALCULATED.3IONS-SCNC: 8 MMOL/L (ref 4–13)
AST SERPL W P-5'-P-CCNC: 22 U/L (ref 5–45)
BASOPHILS # BLD AUTO: 0.03 THOUSANDS/ΜL (ref 0–0.1)
BASOPHILS NFR BLD AUTO: 1 % (ref 0–1)
BILIRUB SERPL-MCNC: 0.6 MG/DL (ref 0.2–1)
BUN SERPL-MCNC: 19 MG/DL (ref 5–25)
CALCIUM SERPL-MCNC: 9 MG/DL (ref 8.3–10.1)
CHLORIDE SERPL-SCNC: 105 MMOL/L (ref 100–108)
CO2 SERPL-SCNC: 31 MMOL/L (ref 21–32)
CREAT SERPL-MCNC: 0.84 MG/DL (ref 0.6–1.3)
CRP SERPL QL: <3 MG/L
EOSINOPHIL # BLD AUTO: 0.19 THOUSAND/ΜL (ref 0–0.61)
EOSINOPHIL NFR BLD AUTO: 4 % (ref 0–6)
ERYTHROCYTE [DISTWIDTH] IN BLOOD BY AUTOMATED COUNT: 12.9 % (ref 11.6–15.1)
ERYTHROCYTE [SEDIMENTATION RATE] IN BLOOD: 1 MM/HOUR (ref 0–10)
GFR SERPL CREATININE-BSD FRML MDRD: 93 ML/MIN/1.73SQ M
GLUCOSE SERPL-MCNC: 104 MG/DL (ref 65–140)
HCT VFR BLD AUTO: 44.3 % (ref 36.5–49.3)
HGB BLD-MCNC: 14.9 G/DL (ref 12–17)
IMM GRANULOCYTES # BLD AUTO: 0.01 THOUSAND/UL (ref 0–0.2)
IMM GRANULOCYTES NFR BLD AUTO: 0 % (ref 0–2)
LYMPHOCYTES # BLD AUTO: 1.22 THOUSANDS/ΜL (ref 0.6–4.47)
LYMPHOCYTES NFR BLD AUTO: 28 % (ref 14–44)
MCH RBC QN AUTO: 30.2 PG (ref 26.8–34.3)
MCHC RBC AUTO-ENTMCNC: 33.6 G/DL (ref 31.4–37.4)
MCV RBC AUTO: 90 FL (ref 82–98)
MONOCYTES # BLD AUTO: 0.38 THOUSAND/ΜL (ref 0.17–1.22)
MONOCYTES NFR BLD AUTO: 9 % (ref 4–12)
NEUTROPHILS # BLD AUTO: 2.48 THOUSANDS/ΜL (ref 1.85–7.62)
NEUTS SEG NFR BLD AUTO: 58 % (ref 43–75)
NRBC BLD AUTO-RTO: 0 /100 WBCS
PLATELET # BLD AUTO: 237 THOUSANDS/UL (ref 149–390)
PMV BLD AUTO: 9.9 FL (ref 8.9–12.7)
POTASSIUM SERPL-SCNC: 3.9 MMOL/L (ref 3.5–5.3)
PROT SERPL-MCNC: 6.6 G/DL (ref 6.4–8.2)
RBC # BLD AUTO: 4.94 MILLION/UL (ref 3.88–5.62)
SODIUM SERPL-SCNC: 144 MMOL/L (ref 136–145)
WBC # BLD AUTO: 4.31 THOUSAND/UL (ref 4.31–10.16)

## 2019-02-09 PROCEDURE — 80053 COMPREHEN METABOLIC PANEL: CPT

## 2019-02-09 PROCEDURE — 36415 COLL VENOUS BLD VENIPUNCTURE: CPT

## 2019-02-09 PROCEDURE — 86140 C-REACTIVE PROTEIN: CPT

## 2019-02-09 PROCEDURE — 85025 COMPLETE CBC W/AUTO DIFF WBC: CPT

## 2019-02-09 PROCEDURE — 85652 RBC SED RATE AUTOMATED: CPT

## 2019-02-12 DIAGNOSIS — M25.562 ACUTE PAIN OF LEFT KNEE: Primary | ICD-10-CM

## 2019-02-12 DIAGNOSIS — M75.102 ROTATOR CUFF SYNDROME OF LEFT SHOULDER: ICD-10-CM

## 2019-02-12 RX ORDER — MELOXICAM 7.5 MG/1
7.5 TABLET ORAL 2 TIMES DAILY
Qty: 60 TABLET | Refills: 0 | Status: SHIPPED | OUTPATIENT
Start: 2019-02-12 | End: 2022-02-09 | Stop reason: ALTCHOICE

## 2019-02-26 ENCOUNTER — OFFICE VISIT (OUTPATIENT)
Dept: OBGYN CLINIC | Facility: CLINIC | Age: 64
End: 2019-02-26
Payer: COMMERCIAL

## 2019-02-26 VITALS
HEIGHT: 67 IN | DIASTOLIC BLOOD PRESSURE: 77 MMHG | HEART RATE: 92 BPM | SYSTOLIC BLOOD PRESSURE: 117 MMHG | WEIGHT: 156 LBS | BODY MASS INDEX: 24.48 KG/M2

## 2019-02-26 DIAGNOSIS — M25.512 LEFT SHOULDER PAIN, UNSPECIFIED CHRONICITY: Primary | ICD-10-CM

## 2019-02-26 PROCEDURE — 99213 OFFICE O/P EST LOW 20 MIN: CPT | Performed by: ORTHOPAEDIC SURGERY

## 2019-02-26 NOTE — PROGRESS NOTES
Patient Name:  Tomie Buerger  MRN:  [id number]    Assessment & Plan     Fully resolved left shoulder pain  1  Continue meloxicam as needed  2  Activities as tolerated  3  Follow-up as needed  Subjective     78-year-old male returns to the office today for follow-up regarding his left shoulder  He was initially seen on 1/22/19  At that time he was prescribed meloxicam   He returns to the office today awake significant improvement  He states his pain almost fully resolved after initiating meloxicam   He denies any weakness or instability  No stiffness  No numbness or tingling  He is happy with his result  General ROS:  Negative for fever or chills  Neurological ROS:  Negative for numbness or tingling  Objective     /77   Pulse 92   Ht 5' 7" (1 702 m)   Wt 70 8 kg (156 lb)   BMI 24 43 kg/m²     Left shoulder:  No gross deformity  No tenderness to palpation  Passive range of motion includes 170° of forward flexion, 90° of external rotation-abduction, 45° of internal rotation-abduction  Negative Roberto impingement  Negative empty can test   Negative speed's test   Negative cross-body adduction test   Sensation intact axillary, median, ulnar and radial nerves  2+ radial pulse  Appropriate mood and affect        Scribe Attestation    I,:   Ramu Patel PA-C am acting as a scribe while in the presence of the attending physician :        I,:   Juan Luis Rocha MD personally performed the services described in this documentation    as scribed in my presence :

## 2019-05-31 ENCOUNTER — APPOINTMENT (OUTPATIENT)
Dept: LAB | Facility: CLINIC | Age: 64
End: 2019-05-31
Payer: COMMERCIAL

## 2019-05-31 DIAGNOSIS — N40.1 BPH WITH OBSTRUCTION/LOWER URINARY TRACT SYMPTOMS: ICD-10-CM

## 2019-05-31 DIAGNOSIS — N13.8 BPH WITH OBSTRUCTION/LOWER URINARY TRACT SYMPTOMS: ICD-10-CM

## 2019-05-31 LAB — PSA SERPL-MCNC: 1.8 NG/ML (ref 0–4)

## 2019-05-31 PROCEDURE — 84153 ASSAY OF PSA TOTAL: CPT

## 2019-06-06 ENCOUNTER — OFFICE VISIT (OUTPATIENT)
Dept: UROLOGY | Facility: CLINIC | Age: 64
End: 2019-06-06
Payer: COMMERCIAL

## 2019-06-06 VITALS
BODY MASS INDEX: 24.48 KG/M2 | SYSTOLIC BLOOD PRESSURE: 104 MMHG | HEART RATE: 82 BPM | DIASTOLIC BLOOD PRESSURE: 62 MMHG | WEIGHT: 156 LBS | HEIGHT: 67 IN

## 2019-06-06 DIAGNOSIS — N40.1 BPH WITH OBSTRUCTION/LOWER URINARY TRACT SYMPTOMS: Primary | ICD-10-CM

## 2019-06-06 DIAGNOSIS — N13.8 BPH WITH OBSTRUCTION/LOWER URINARY TRACT SYMPTOMS: Primary | ICD-10-CM

## 2019-06-06 PROCEDURE — 99213 OFFICE O/P EST LOW 20 MIN: CPT | Performed by: PHYSICIAN ASSISTANT

## 2019-07-10 ENCOUNTER — TRANSCRIBE ORDERS (OUTPATIENT)
Dept: LAB | Facility: CLINIC | Age: 64
End: 2019-07-10

## 2019-07-10 ENCOUNTER — APPOINTMENT (OUTPATIENT)
Dept: LAB | Facility: CLINIC | Age: 64
End: 2019-07-10
Payer: COMMERCIAL

## 2019-07-10 DIAGNOSIS — R53.83 FATIGUE, UNSPECIFIED TYPE: Primary | ICD-10-CM

## 2019-07-10 DIAGNOSIS — R53.83 FATIGUE, UNSPECIFIED TYPE: ICD-10-CM

## 2019-07-10 DIAGNOSIS — E78.5 HYPERLIPIDEMIA, UNSPECIFIED HYPERLIPIDEMIA TYPE: ICD-10-CM

## 2019-07-10 LAB
CHOLEST SERPL-MCNC: 182 MG/DL (ref 50–200)
EST. AVERAGE GLUCOSE BLD GHB EST-MCNC: 148 MG/DL
HBA1C MFR BLD: 6.8 % (ref 4.2–6.3)
HDLC SERPL-MCNC: 51 MG/DL (ref 40–60)
LDLC SERPL CALC-MCNC: 116 MG/DL (ref 0–100)
NONHDLC SERPL-MCNC: 131 MG/DL
T4 SERPL-MCNC: 8.7 UG/DL (ref 4.7–13.3)
TRIGL SERPL-MCNC: 75 MG/DL
TSH SERPL DL<=0.05 MIU/L-ACNC: 2.13 UIU/ML (ref 0.36–3.74)

## 2019-07-10 PROCEDURE — 84443 ASSAY THYROID STIM HORMONE: CPT

## 2019-07-10 PROCEDURE — 36415 COLL VENOUS BLD VENIPUNCTURE: CPT

## 2019-07-10 PROCEDURE — 84436 ASSAY OF TOTAL THYROXINE: CPT

## 2019-07-10 PROCEDURE — 80061 LIPID PANEL: CPT

## 2019-07-10 PROCEDURE — 83036 HEMOGLOBIN GLYCOSYLATED A1C: CPT

## 2019-10-11 ENCOUNTER — APPOINTMENT (OUTPATIENT)
Dept: LAB | Facility: CLINIC | Age: 64
End: 2019-10-11
Payer: COMMERCIAL

## 2019-10-11 ENCOUNTER — TRANSCRIBE ORDERS (OUTPATIENT)
Dept: LAB | Facility: CLINIC | Age: 64
End: 2019-10-11

## 2019-10-11 DIAGNOSIS — R73.09 ELEVATED RANDOM BLOOD GLUCOSE LEVEL: Primary | ICD-10-CM

## 2019-10-11 LAB
EST. AVERAGE GLUCOSE BLD GHB EST-MCNC: 120 MG/DL
HBA1C MFR BLD: 5.8 % (ref 4.2–6.3)

## 2019-10-11 PROCEDURE — 83036 HEMOGLOBIN GLYCOSYLATED A1C: CPT | Performed by: INTERNAL MEDICINE

## 2019-10-11 PROCEDURE — 36415 COLL VENOUS BLD VENIPUNCTURE: CPT | Performed by: INTERNAL MEDICINE

## 2020-06-10 ENCOUNTER — APPOINTMENT (OUTPATIENT)
Dept: LAB | Facility: CLINIC | Age: 65
End: 2020-06-10
Payer: MEDICARE

## 2020-06-10 ENCOUNTER — TRANSCRIBE ORDERS (OUTPATIENT)
Dept: LAB | Facility: CLINIC | Age: 65
End: 2020-06-10

## 2020-06-10 DIAGNOSIS — N13.8 BPH WITH OBSTRUCTION/LOWER URINARY TRACT SYMPTOMS: ICD-10-CM

## 2020-06-10 DIAGNOSIS — N40.1 BPH WITH OBSTRUCTION/LOWER URINARY TRACT SYMPTOMS: ICD-10-CM

## 2020-06-10 DIAGNOSIS — E78.5 HYPERLIPIDEMIA, UNSPECIFIED HYPERLIPIDEMIA TYPE: ICD-10-CM

## 2020-06-10 DIAGNOSIS — E78.5 HYPERLIPIDEMIA, UNSPECIFIED HYPERLIPIDEMIA TYPE: Primary | ICD-10-CM

## 2020-06-10 LAB
ALBUMIN SERPL BCP-MCNC: 3.9 G/DL (ref 3.5–5)
ALP SERPL-CCNC: 93 U/L (ref 46–116)
ALT SERPL W P-5'-P-CCNC: 27 U/L (ref 12–78)
ANION GAP SERPL CALCULATED.3IONS-SCNC: 6 MMOL/L (ref 4–13)
AST SERPL W P-5'-P-CCNC: 25 U/L (ref 5–45)
BILIRUB SERPL-MCNC: 0.9 MG/DL (ref 0.2–1)
BUN SERPL-MCNC: 21 MG/DL (ref 5–25)
CALCIUM SERPL-MCNC: 8.9 MG/DL (ref 8.3–10.1)
CHLORIDE SERPL-SCNC: 105 MMOL/L (ref 100–108)
CHOLEST SERPL-MCNC: 169 MG/DL (ref 50–200)
CO2 SERPL-SCNC: 29 MMOL/L (ref 21–32)
CREAT SERPL-MCNC: 1.07 MG/DL (ref 0.6–1.3)
GFR SERPL CREATININE-BSD FRML MDRD: 72 ML/MIN/1.73SQ M
GLUCOSE P FAST SERPL-MCNC: 118 MG/DL (ref 65–99)
HDLC SERPL-MCNC: 53 MG/DL
LDLC SERPL CALC-MCNC: 104 MG/DL (ref 0–100)
NONHDLC SERPL-MCNC: 116 MG/DL
POTASSIUM SERPL-SCNC: 3.6 MMOL/L (ref 3.5–5.3)
PROT SERPL-MCNC: 6.6 G/DL (ref 6.4–8.2)
PSA SERPL-MCNC: 1.7 NG/ML (ref 0–4)
SODIUM SERPL-SCNC: 140 MMOL/L (ref 136–145)
TRIGL SERPL-MCNC: 61 MG/DL

## 2020-06-10 PROCEDURE — 80061 LIPID PANEL: CPT

## 2020-06-10 PROCEDURE — 84153 ASSAY OF PSA TOTAL: CPT

## 2020-06-10 PROCEDURE — 36415 COLL VENOUS BLD VENIPUNCTURE: CPT

## 2020-06-10 PROCEDURE — 80053 COMPREHEN METABOLIC PANEL: CPT

## 2020-08-13 ENCOUNTER — OFFICE VISIT (OUTPATIENT)
Dept: UROLOGY | Facility: CLINIC | Age: 65
End: 2020-08-13
Payer: MEDICARE

## 2020-08-13 VITALS
DIASTOLIC BLOOD PRESSURE: 62 MMHG | SYSTOLIC BLOOD PRESSURE: 126 MMHG | HEART RATE: 95 BPM | BODY MASS INDEX: 24.33 KG/M2 | HEIGHT: 67 IN | TEMPERATURE: 98.1 F | WEIGHT: 155 LBS

## 2020-08-13 DIAGNOSIS — N13.8 BPH WITH OBSTRUCTION/LOWER URINARY TRACT SYMPTOMS: Primary | ICD-10-CM

## 2020-08-13 DIAGNOSIS — N40.1 BPH WITH OBSTRUCTION/LOWER URINARY TRACT SYMPTOMS: Primary | ICD-10-CM

## 2020-08-13 PROCEDURE — 99213 OFFICE O/P EST LOW 20 MIN: CPT | Performed by: PHYSICIAN ASSISTANT

## 2020-08-13 RX ORDER — BREXPIPRAZOLE 2 MG/1
2 TABLET ORAL EVERY MORNING
COMMUNITY
Start: 2020-07-16 | End: 2022-05-18

## 2020-08-13 NOTE — PROGRESS NOTES
UROLOGY PROGRESS NOTE   Patient Identifiers: Slick Riggs (MRN [id number])  Date of Service: 8/13/2020    Subjective:    14-year-old man with history of BPH  His PSA has remained 1 7  He had a TURP in 2014  He has no outlet complaints  Reason for visit:  BPH follow-up    Objective:     VITALS:    There were no vitals filed for this visit  LABS:  Lab Results   Component Value Date    HGB 14 9 02/09/2019    HCT 44 3 02/09/2019    WBC 4 31 02/09/2019     02/09/2019   ]    Lab Results   Component Value Date     11/21/2015    K 3 6 06/10/2020     06/10/2020    CO2 29 06/10/2020    BUN 21 06/10/2020    CREATININE 1 07 06/10/2020    CALCIUM 8 9 06/10/2020    GLUCOSE 92 11/21/2015   ]        INPATIENT MEDS:    Current Outpatient Medications:     clomiPRAMINE (ANAFRANIL) 25 mg capsule, Take 50 mg by mouth daily at bedtime, Disp: , Rfl:     clonazePAM (KlonoPIN) 0 5 mg tablet, Take 0 5 mg by mouth 2 (two) times a day as needed for seizures, Disp: , Rfl:     folic acid (FOLVITE) 1 mg tablet, Take 1 mg by mouth daily, Disp: , Rfl:     lisdexamfetamine (VYVANSE) 30 MG capsule, Take 30 mg by mouth every morning, Disp: , Rfl:     meloxicam (MOBIC) 7 5 mg tablet, Take 1 tablet (7 5 mg total) by mouth 2 (two) times a day, Disp: 60 tablet, Rfl: 0    methotrexate 2 5 MG tablet, Take by mouth 5 tablets on Sundays , Disp: , Rfl:     simvastatin (ZOCOR) 5 MG tablet, Take 5 mg by mouth daily, Disp: , Rfl:     vilazodone (VIIBRYD) 20 mg tablet, Take 20 mg by mouth daily with breakfast  , Disp: , Rfl:       Physical Exam:   There were no vitals taken for this visit  GEN: no acute distress    RESP: breathing comfortably with no accessory muscle use    ABD: soft, non-tender, non-distended   INCISION:    EXT: no significant peripheral edema   (Male): Penis circumcised, phallus normal, meatus patent  Testicles descended into scrotum bilaterally without masses nor tenderness    No inguinal hernias bilaterally  VIVIAN: Prostate is enlarged at 35 grams  The prostate is not boggy  The prostate is not tender  No nodules noted      RADIOLOGY:   None     Assessment:   1   BPH     Plan:   -follow-up in 1 year with PSA prior to visit  -  -  -

## 2020-09-15 ENCOUNTER — TELEPHONE (OUTPATIENT)
Dept: UROLOGY | Facility: MEDICAL CENTER | Age: 65
End: 2020-09-15

## 2020-09-15 DIAGNOSIS — N13.8 BPH WITH OBSTRUCTION/LOWER URINARY TRACT SYMPTOMS: Primary | ICD-10-CM

## 2020-09-15 DIAGNOSIS — N40.1 BPH WITH OBSTRUCTION/LOWER URINARY TRACT SYMPTOMS: Primary | ICD-10-CM

## 2020-09-15 NOTE — TELEPHONE ENCOUNTER
Patient followed by Rachel Mancia at the [facility]  He is reported an increase in the need to urinate during the day only  No issues at night   It is clear, no pain, minima burning, full stream, urgency is noted  Hydrating well  Please call at [phone] to discuss    Thank you

## 2020-09-16 NOTE — TELEPHONE ENCOUNTER
Patient previously of the [place] office, known to the practice for BPH with LUTS, s/p TURP 2014  Called and spoke with patient at this time  He reports he is feeling ok today  He has noticed recently he has been having increased urgency and frequency during the day  For example he will urinate and then maybe 30 minutes later he will feel pressure like he needs to urinate again  Patient reports he does not wake up at all at night  He denies any changes in his diet and reports minimal intake of caffiene  He states this has been going on for a few days  Advised continued aggressive hydration  To rule out infection we would recommend urine testing  This can be done at any [facility] outpatient lab, orders have been placed  Advised we will then call with results  Patient also states diabetes runs in his family and he was researching that increase glucose levels could cause this  Advised glucose in the urine could cause irritative symptoms, urinalysis would show this if that is the case  Advised we will alert patient of results when available, and if the cause is glucose we would recommend follow up with his PCP  Patient verbalized understanding and will go for urine testing today or tomorrow  Advised I will also route to providers for any further recommendations  We will then call back if there is anything further, otherwise we will call with results  Patient verbalized understanding

## 2020-09-18 ENCOUNTER — APPOINTMENT (OUTPATIENT)
Dept: LAB | Facility: CLINIC | Age: 65
End: 2020-09-18
Payer: MEDICARE

## 2020-09-18 DIAGNOSIS — N13.8 BPH WITH OBSTRUCTION/LOWER URINARY TRACT SYMPTOMS: ICD-10-CM

## 2020-09-18 DIAGNOSIS — N40.1 BPH WITH OBSTRUCTION/LOWER URINARY TRACT SYMPTOMS: ICD-10-CM

## 2020-09-18 LAB
BACTERIA UR QL AUTO: NORMAL /HPF
BILIRUB UR QL STRIP: NEGATIVE
CLARITY UR: CLEAR
COLOR UR: YELLOW
GLUCOSE UR STRIP-MCNC: NEGATIVE MG/DL
HGB UR QL STRIP.AUTO: NEGATIVE
KETONES UR STRIP-MCNC: NEGATIVE MG/DL
LEUKOCYTE ESTERASE UR QL STRIP: NEGATIVE
NITRITE UR QL STRIP: NEGATIVE
NON-SQ EPI CELLS URNS QL MICRO: NORMAL /HPF
PH UR STRIP.AUTO: 6 [PH]
PROT UR STRIP-MCNC: NEGATIVE MG/DL
RBC #/AREA URNS AUTO: NORMAL /HPF
SP GR UR STRIP.AUTO: <=1.005 (ref 1–1.03)
UROBILINOGEN UR QL STRIP.AUTO: 0.2 E.U./DL
WBC #/AREA URNS AUTO: NORMAL /HPF

## 2020-09-18 PROCEDURE — 87086 URINE CULTURE/COLONY COUNT: CPT

## 2020-09-18 PROCEDURE — 81001 URINALYSIS AUTO W/SCOPE: CPT

## 2020-09-18 NOTE — TELEPHONE ENCOUNTER
Dino Sanford PA-C  [address] [place] [address] Drive [facility]               Good news! Rivera Arteaga is completely clean  No abnormalities   Please recommend hydration with water and avoidance of bladder irritants  Called and spoke to patient advised of above information   He verbalized understanding and is thankful for call

## 2020-09-19 LAB — BACTERIA UR CULT: NORMAL

## 2020-10-11 ENCOUNTER — HOSPITAL ENCOUNTER (EMERGENCY)
Facility: HOSPITAL | Age: 65
Discharge: HOME | End: 2020-10-11
Attending: EMERGENCY MEDICINE
Payer: MEDICARE

## 2020-10-11 ENCOUNTER — APPOINTMENT (EMERGENCY)
Dept: RADIOLOGY | Facility: HOSPITAL | Age: 65
End: 2020-10-11
Payer: MEDICARE

## 2020-10-11 VITALS
SYSTOLIC BLOOD PRESSURE: 138 MMHG | DIASTOLIC BLOOD PRESSURE: 74 MMHG | RESPIRATION RATE: 20 BRPM | HEART RATE: 70 BPM | TEMPERATURE: 97.6 F | BODY MASS INDEX: 24.93 KG/M2 | OXYGEN SATURATION: 99 % | WEIGHT: 159.17 LBS

## 2020-10-11 DIAGNOSIS — R07.9 CHEST PAIN: Primary | ICD-10-CM

## 2020-10-11 DIAGNOSIS — F41.9 ANXIETY: ICD-10-CM

## 2020-10-11 LAB
ALBUMIN SERPL BCP-MCNC: 4.2 G/DL (ref 3.5–5)
ALP SERPL-CCNC: 108 U/L (ref 46–116)
ALT SERPL W P-5'-P-CCNC: 48 U/L (ref 12–78)
ANION GAP SERPL CALCULATED.3IONS-SCNC: 4 MMOL/L (ref 4–13)
AST SERPL W P-5'-P-CCNC: 33 U/L (ref 5–45)
BASOPHILS # BLD AUTO: 0.03 THOUSANDS/ΜL (ref 0–0.1)
BASOPHILS NFR BLD AUTO: 1 % (ref 0–1)
BILIRUB SERPL-MCNC: 0.89 MG/DL (ref 0.2–1)
BUN SERPL-MCNC: 17 MG/DL (ref 5–25)
CALCIUM SERPL-MCNC: 9.6 MG/DL (ref 8.3–10.1)
CHLORIDE SERPL-SCNC: 106 MMOL/L (ref 100–108)
CO2 SERPL-SCNC: 32 MMOL/L (ref 21–32)
CREAT SERPL-MCNC: 0.88 MG/DL (ref 0.6–1.3)
EOSINOPHIL # BLD AUTO: 0.06 THOUSAND/ΜL (ref 0–0.61)
EOSINOPHIL NFR BLD AUTO: 1 % (ref 0–6)
ERYTHROCYTE [DISTWIDTH] IN BLOOD BY AUTOMATED COUNT: 13.1 % (ref 11.6–15.1)
GFR SERPL CREATININE-BSD FRML MDRD: 90 ML/MIN/1.73SQ M
GLUCOSE SERPL-MCNC: 102 MG/DL (ref 65–140)
HCT VFR BLD AUTO: 46.4 % (ref 36.5–49.3)
HGB BLD-MCNC: 15.7 G/DL (ref 12–17)
IMM GRANULOCYTES # BLD AUTO: 0.01 THOUSAND/UL (ref 0–0.2)
IMM GRANULOCYTES NFR BLD AUTO: 0 % (ref 0–2)
LYMPHOCYTES # BLD AUTO: 1.38 THOUSANDS/ΜL (ref 0.6–4.47)
LYMPHOCYTES NFR BLD AUTO: 26 % (ref 14–44)
MCH RBC QN AUTO: 30.7 PG (ref 26.8–34.3)
MCHC RBC AUTO-ENTMCNC: 33.8 G/DL (ref 31.4–37.4)
MCV RBC AUTO: 91 FL (ref 82–98)
MONOCYTES # BLD AUTO: 0.47 THOUSAND/ΜL (ref 0.17–1.22)
MONOCYTES NFR BLD AUTO: 9 % (ref 4–12)
NEUTROPHILS # BLD AUTO: 3.27 THOUSANDS/ΜL (ref 1.85–7.62)
NEUTS SEG NFR BLD AUTO: 63 % (ref 43–75)
NRBC BLD AUTO-RTO: 0 /100 WBCS
PLATELET # BLD AUTO: 235 THOUSANDS/UL (ref 149–390)
PMV BLD AUTO: 10 FL (ref 8.9–12.7)
POTASSIUM SERPL-SCNC: 4.3 MMOL/L (ref 3.5–5.3)
PROT SERPL-MCNC: 7.3 G/DL (ref 6.4–8.2)
RBC # BLD AUTO: 5.11 MILLION/UL (ref 3.88–5.62)
SODIUM SERPL-SCNC: 142 MMOL/L (ref 136–145)
TROPONIN I SERPL-MCNC: <0.02 NG/ML
TROPONIN I SERPL-MCNC: <0.02 NG/ML
WBC # BLD AUTO: 5.22 THOUSAND/UL (ref 4.31–10.16)

## 2020-10-11 PROCEDURE — 85025 COMPLETE CBC W/AUTO DIFF WBC: CPT | Performed by: EMERGENCY MEDICINE

## 2020-10-11 PROCEDURE — 36415 COLL VENOUS BLD VENIPUNCTURE: CPT | Performed by: EMERGENCY MEDICINE

## 2020-10-11 PROCEDURE — 80053 COMPREHEN METABOLIC PANEL: CPT | Performed by: EMERGENCY MEDICINE

## 2020-10-11 PROCEDURE — 71045 X-RAY EXAM CHEST 1 VIEW: CPT

## 2020-10-11 PROCEDURE — 84484 ASSAY OF TROPONIN QUANT: CPT | Performed by: EMERGENCY MEDICINE

## 2020-10-11 PROCEDURE — 99285 EMERGENCY DEPT VISIT HI MDM: CPT

## 2020-10-11 PROCEDURE — 93005 ELECTROCARDIOGRAM TRACING: CPT

## 2020-10-11 PROCEDURE — 99285 EMERGENCY DEPT VISIT HI MDM: CPT | Performed by: EMERGENCY MEDICINE

## 2020-10-12 LAB
ATRIAL RATE: 65 BPM
ATRIAL RATE: 73 BPM
P AXIS: 66 DEGREES
P AXIS: 67 DEGREES
PR INTERVAL: 168 MS
PR INTERVAL: 172 MS
QRS AXIS: 56 DEGREES
QRS AXIS: 65 DEGREES
QRSD INTERVAL: 90 MS
QRSD INTERVAL: 90 MS
QT INTERVAL: 368 MS
QT INTERVAL: 388 MS
QTC INTERVAL: 403 MS
QTC INTERVAL: 405 MS
T WAVE AXIS: 66 DEGREES
T WAVE AXIS: 67 DEGREES
VENTRICULAR RATE: 65 BPM
VENTRICULAR RATE: 73 BPM

## 2020-10-12 PROCEDURE — 93010 ELECTROCARDIOGRAM REPORT: CPT | Performed by: INTERNAL MEDICINE

## 2020-10-14 ENCOUNTER — HOSPITAL ENCOUNTER (OUTPATIENT)
Dept: NON INVASIVE DIAGNOSTICS | Facility: HOSPITAL | Age: 65
Discharge: HOME | End: 2020-10-14
Attending: EMERGENCY MEDICINE
Payer: MEDICARE

## 2020-10-14 ENCOUNTER — HOSPITAL ENCOUNTER (OUTPATIENT)
Dept: NUCLEAR MEDICINE | Facility: HOSPITAL | Age: 65
Discharge: HOME | End: 2020-10-14
Attending: EMERGENCY MEDICINE
Payer: MEDICARE

## 2020-10-14 DIAGNOSIS — R07.9 CHEST PAIN: ICD-10-CM

## 2020-10-14 PROCEDURE — 78452 HT MUSCLE IMAGE SPECT MULT: CPT

## 2020-10-14 PROCEDURE — 93017 CV STRESS TEST TRACING ONLY: CPT

## 2020-10-14 PROCEDURE — G1004 CDSM NDSC: HCPCS

## 2020-10-14 PROCEDURE — A9502 TC99M TETROFOSMIN: HCPCS

## 2020-10-14 PROCEDURE — 78452 HT MUSCLE IMAGE SPECT MULT: CPT | Performed by: INTERNAL MEDICINE

## 2020-10-14 PROCEDURE — 93016 CV STRESS TEST SUPVJ ONLY: CPT | Performed by: INTERNAL MEDICINE

## 2020-10-14 PROCEDURE — 93018 CV STRESS TEST I&R ONLY: CPT | Performed by: INTERNAL MEDICINE

## 2020-10-14 RX ADMIN — REGADENOSON 0.4 MG: 0.08 INJECTION, SOLUTION INTRAVENOUS at 09:36

## 2020-10-15 LAB
CHEST PAIN STATEMENT: NORMAL
MAX DIASTOLIC BP: 66 MMHG
MAX HEART RATE: 142 BPM
MAX PREDICTED HEART RATE: 155 BPM
MAX. SYSTOLIC BP: 126 MMHG
PROTOCOL NAME: NORMAL
REASON FOR TERMINATION: NORMAL
TARGET HR FORMULA: NORMAL
TEST INDICATION: NORMAL
TIME IN EXERCISE PHASE: NORMAL

## 2020-10-23 ENCOUNTER — TELEPHONE (OUTPATIENT)
Dept: CARDIOLOGY CLINIC | Facility: CLINIC | Age: 65
End: 2020-10-23

## 2020-11-17 ENCOUNTER — OFFICE VISIT (OUTPATIENT)
Dept: CARDIOLOGY CLINIC | Facility: CLINIC | Age: 65
End: 2020-11-17
Payer: MEDICARE

## 2020-11-17 VITALS
SYSTOLIC BLOOD PRESSURE: 100 MMHG | WEIGHT: 160.4 LBS | BODY MASS INDEX: 25.18 KG/M2 | HEIGHT: 67 IN | OXYGEN SATURATION: 95 % | DIASTOLIC BLOOD PRESSURE: 62 MMHG | HEART RATE: 83 BPM

## 2020-11-17 DIAGNOSIS — E78.2 MIXED HYPERLIPIDEMIA: Primary | Chronic | ICD-10-CM

## 2020-11-17 DIAGNOSIS — R07.89 OTHER CHEST PAIN: ICD-10-CM

## 2020-11-17 PROCEDURE — 99204 OFFICE O/P NEW MOD 45 MIN: CPT | Performed by: INTERNAL MEDICINE

## 2021-02-13 DIAGNOSIS — Z23 ENCOUNTER FOR IMMUNIZATION: ICD-10-CM

## 2021-02-17 ENCOUNTER — IMMUNIZATIONS (OUTPATIENT)
Dept: FAMILY MEDICINE CLINIC | Facility: HOSPITAL | Age: 66
End: 2021-02-17

## 2021-02-17 DIAGNOSIS — Z23 ENCOUNTER FOR IMMUNIZATION: Primary | ICD-10-CM

## 2021-02-17 PROCEDURE — 0001A SARS-COV-2 / COVID-19 MRNA VACCINE (PFIZER-BIONTECH) 30 MCG: CPT

## 2021-02-17 PROCEDURE — 91300 SARS-COV-2 / COVID-19 MRNA VACCINE (PFIZER-BIONTECH) 30 MCG: CPT

## 2021-03-09 ENCOUNTER — IMMUNIZATIONS (OUTPATIENT)
Dept: FAMILY MEDICINE CLINIC | Facility: HOSPITAL | Age: 66
End: 2021-03-09

## 2021-03-09 DIAGNOSIS — Z23 ENCOUNTER FOR IMMUNIZATION: Primary | ICD-10-CM

## 2021-03-09 PROCEDURE — 0002A SARS-COV-2 / COVID-19 MRNA VACCINE (PFIZER-BIONTECH) 30 MCG: CPT

## 2021-03-09 PROCEDURE — 91300 SARS-COV-2 / COVID-19 MRNA VACCINE (PFIZER-BIONTECH) 30 MCG: CPT

## 2021-04-23 ENCOUNTER — TRANSCRIBE ORDERS (OUTPATIENT)
Dept: LAB | Facility: CLINIC | Age: 66
End: 2021-04-23

## 2021-04-23 ENCOUNTER — APPOINTMENT (OUTPATIENT)
Dept: LAB | Facility: CLINIC | Age: 66
End: 2021-04-23
Payer: MEDICARE

## 2021-04-23 DIAGNOSIS — E55.9 AVITAMINOSIS D: ICD-10-CM

## 2021-04-23 DIAGNOSIS — M35.3 POLYMYALGIA RHEUMATICA (HCC): Primary | ICD-10-CM

## 2021-04-23 DIAGNOSIS — R53.83 FATIGUE, UNSPECIFIED TYPE: ICD-10-CM

## 2021-04-23 DIAGNOSIS — E78.5 HYPERLIPIDEMIA, UNSPECIFIED HYPERLIPIDEMIA TYPE: Primary | ICD-10-CM

## 2021-04-23 DIAGNOSIS — M35.3 POLYMYALGIA RHEUMATICA (HCC): ICD-10-CM

## 2021-04-23 DIAGNOSIS — E78.5 HYPERLIPIDEMIA, UNSPECIFIED HYPERLIPIDEMIA TYPE: ICD-10-CM

## 2021-04-23 LAB
25(OH)D3 SERPL-MCNC: 26.2 NG/ML (ref 30–100)
ALBUMIN SERPL BCP-MCNC: 3.8 G/DL (ref 3.5–5)
ALP SERPL-CCNC: 99 U/L (ref 46–116)
ALT SERPL W P-5'-P-CCNC: 33 U/L (ref 12–78)
ANION GAP SERPL CALCULATED.3IONS-SCNC: 7 MMOL/L (ref 4–13)
AST SERPL W P-5'-P-CCNC: 21 U/L (ref 5–45)
BASOPHILS # BLD AUTO: 0.04 THOUSANDS/ΜL (ref 0–0.1)
BASOPHILS NFR BLD AUTO: 1 % (ref 0–1)
BILIRUB SERPL-MCNC: 0.71 MG/DL (ref 0.2–1)
BUN SERPL-MCNC: 13 MG/DL (ref 5–25)
CALCIUM SERPL-MCNC: 8.8 MG/DL (ref 8.3–10.1)
CHLORIDE SERPL-SCNC: 106 MMOL/L (ref 100–108)
CHOLEST SERPL-MCNC: 191 MG/DL (ref 50–200)
CO2 SERPL-SCNC: 29 MMOL/L (ref 21–32)
CREAT SERPL-MCNC: 1.04 MG/DL (ref 0.6–1.3)
CRP SERPL QL: <3 MG/L
EOSINOPHIL # BLD AUTO: 0.14 THOUSAND/ΜL (ref 0–0.61)
EOSINOPHIL NFR BLD AUTO: 3 % (ref 0–6)
ERYTHROCYTE [DISTWIDTH] IN BLOOD BY AUTOMATED COUNT: 12.7 % (ref 11.6–15.1)
ERYTHROCYTE [SEDIMENTATION RATE] IN BLOOD: 2 MM/HOUR (ref 0–19)
GFR SERPL CREATININE-BSD FRML MDRD: 74 ML/MIN/1.73SQ M
GLUCOSE P FAST SERPL-MCNC: 111 MG/DL (ref 65–99)
HCT VFR BLD AUTO: 44.8 % (ref 36.5–49.3)
HDLC SERPL-MCNC: 49 MG/DL
HGB BLD-MCNC: 15.2 G/DL (ref 12–17)
IMM GRANULOCYTES # BLD AUTO: 0.01 THOUSAND/UL (ref 0–0.2)
IMM GRANULOCYTES NFR BLD AUTO: 0 % (ref 0–2)
LDLC SERPL CALC-MCNC: 122 MG/DL (ref 0–100)
LYMPHOCYTES # BLD AUTO: 1.42 THOUSANDS/ΜL (ref 0.6–4.47)
LYMPHOCYTES NFR BLD AUTO: 26 % (ref 14–44)
MCH RBC QN AUTO: 30.1 PG (ref 26.8–34.3)
MCHC RBC AUTO-ENTMCNC: 33.9 G/DL (ref 31.4–37.4)
MCV RBC AUTO: 89 FL (ref 82–98)
MONOCYTES # BLD AUTO: 0.43 THOUSAND/ΜL (ref 0.17–1.22)
MONOCYTES NFR BLD AUTO: 8 % (ref 4–12)
NEUTROPHILS # BLD AUTO: 3.47 THOUSANDS/ΜL (ref 1.85–7.62)
NEUTS SEG NFR BLD AUTO: 62 % (ref 43–75)
NONHDLC SERPL-MCNC: 142 MG/DL
NRBC BLD AUTO-RTO: 0 /100 WBCS
PLATELET # BLD AUTO: 222 THOUSANDS/UL (ref 149–390)
PMV BLD AUTO: 9.7 FL (ref 8.9–12.7)
POTASSIUM SERPL-SCNC: 4 MMOL/L (ref 3.5–5.3)
PROT SERPL-MCNC: 6.6 G/DL (ref 6.4–8.2)
RBC # BLD AUTO: 5.05 MILLION/UL (ref 3.88–5.62)
RHEUMATOID FACT SER QL LA: NEGATIVE
SODIUM SERPL-SCNC: 142 MMOL/L (ref 136–145)
T4 SERPL-MCNC: 8.7 UG/DL (ref 4.7–13.3)
TRIGL SERPL-MCNC: 98 MG/DL
TSH SERPL DL<=0.05 MIU/L-ACNC: 1.49 UIU/ML (ref 0.36–3.74)
URATE SERPL-MCNC: 5.6 MG/DL (ref 4.2–8)
WBC # BLD AUTO: 5.51 THOUSAND/UL (ref 4.31–10.16)

## 2021-04-23 PROCEDURE — 86200 CCP ANTIBODY: CPT

## 2021-04-23 PROCEDURE — 84436 ASSAY OF TOTAL THYROXINE: CPT

## 2021-04-23 PROCEDURE — 84443 ASSAY THYROID STIM HORMONE: CPT

## 2021-04-23 PROCEDURE — 80053 COMPREHEN METABOLIC PANEL: CPT

## 2021-04-23 PROCEDURE — 84550 ASSAY OF BLOOD/URIC ACID: CPT

## 2021-04-23 PROCEDURE — 85025 COMPLETE CBC W/AUTO DIFF WBC: CPT

## 2021-04-23 PROCEDURE — 86430 RHEUMATOID FACTOR TEST QUAL: CPT

## 2021-04-23 PROCEDURE — 80061 LIPID PANEL: CPT

## 2021-04-23 PROCEDURE — 82306 VITAMIN D 25 HYDROXY: CPT

## 2021-04-23 PROCEDURE — 85652 RBC SED RATE AUTOMATED: CPT

## 2021-04-23 PROCEDURE — 36415 COLL VENOUS BLD VENIPUNCTURE: CPT

## 2021-04-23 PROCEDURE — 86140 C-REACTIVE PROTEIN: CPT

## 2021-04-26 LAB — CCP AB SER IA-ACNC: <0.4

## 2021-08-20 ENCOUNTER — APPOINTMENT (OUTPATIENT)
Dept: LAB | Facility: CLINIC | Age: 66
End: 2021-08-20
Payer: MEDICARE

## 2021-08-20 ENCOUNTER — TELEPHONE (OUTPATIENT)
Dept: UROLOGY | Facility: MEDICAL CENTER | Age: 66
End: 2021-08-20

## 2021-08-20 DIAGNOSIS — N40.1 BPH WITH OBSTRUCTION/LOWER URINARY TRACT SYMPTOMS: ICD-10-CM

## 2021-08-20 DIAGNOSIS — N13.8 BPH WITH OBSTRUCTION/LOWER URINARY TRACT SYMPTOMS: ICD-10-CM

## 2021-08-20 PROCEDURE — 84153 ASSAY OF PSA TOTAL: CPT

## 2021-08-20 NOTE — TELEPHONE ENCOUNTER
Contacted patient   No answer left a message on his voice mail and advised him that his order for blood work was active and okay to use

## 2021-08-20 NOTE — TELEPHONE ENCOUNTER
Patient seen Valeria Sorto at [facility]    Patient needs an updated PSA order  He will be going tomorrow for it

## 2021-08-21 LAB — PSA SERPL-MCNC: 1.9 NG/ML (ref 0–4)

## 2021-08-24 ENCOUNTER — OFFICE VISIT (OUTPATIENT)
Dept: UROLOGY | Facility: CLINIC | Age: 66
End: 2021-08-24
Payer: MEDICARE

## 2021-08-24 VITALS
DIASTOLIC BLOOD PRESSURE: 62 MMHG | BODY MASS INDEX: 25.39 KG/M2 | HEART RATE: 94 BPM | SYSTOLIC BLOOD PRESSURE: 98 MMHG | HEIGHT: 67 IN | WEIGHT: 161.8 LBS

## 2021-08-24 DIAGNOSIS — N40.0 BENIGN PROSTATIC HYPERPLASIA WITHOUT LOWER URINARY TRACT SYMPTOMS: Primary | ICD-10-CM

## 2021-08-24 PROCEDURE — 99213 OFFICE O/P EST LOW 20 MIN: CPT | Performed by: PHYSICIAN ASSISTANT

## 2021-08-24 NOTE — PROGRESS NOTES
UROLOGY PROGRESS NOTE   Patient Identifiers: Aby Paz (MRN [id number])  Date of Service: 8/24/2021    Subjective:     67-year-old man history of BPH  His PSA is 1 9  He had a TURP 2014  He has no significant outlet complaints  Reason for visit:  BPH follow-up      Objective:     VITALS:    Vitals:    08/24/21 1527   BP: 98/62   Pulse: 94     AUA SYMPTOM SCORE      Most Recent Value   AUA SYMPTOM SCORE   How often have you had a sensation of not emptying your bladder completely after you finished urinating? 0   How often have you had to urinate again less than two hours after you finished urinating? 1   How often have you found you stopped and started again several times when you urinate?  0   How often have you found it difficult to postpone urination? 0   How often have you had a weak urinary stream?  0   How often have you had to push or strain to begin urination? 0   How many times did you most typically get up to urinate from the time you went to bed at night until the time you got up in the morning?   1   Quality of Life: If you were to spend the rest of your life with your urinary condition just the way it is now, how would you feel about that?  0   AUA SYMPTOM SCORE  2            LABS:  Lab Results   Component Value Date    HGB 15 2 04/23/2021    HCT 44 8 04/23/2021    WBC 5 51 04/23/2021     04/23/2021   ]    Lab Results   Component Value Date     11/21/2015    K 4 0 04/23/2021     04/23/2021    CO2 29 04/23/2021    BUN 13 04/23/2021    CREATININE 1 04 04/23/2021    CALCIUM 8 8 04/23/2021    GLUCOSE 92 11/21/2015   ]        INPATIENT MEDS:    Current Outpatient Medications:     clonazePAM (KlonoPIN) 0 5 mg tablet, Take 0 5 mg by mouth 2 (two) times a day as needed for seizures, Disp: , Rfl:     folic acid (FOLVITE) 1 mg tablet, Take 1 mg by mouth daily, Disp: , Rfl:     lisdexamfetamine (VYVANSE) 30 MG capsule, Take 15 mg by mouth every morning , Disp: , Rfl:    methotrexate 2 5 MG tablet, Take by mouth 5 tablets on Sundays , Disp: , Rfl:     simvastatin (ZOCOR) 5 MG tablet, Take 5 mg by mouth daily, Disp: , Rfl:     vilazodone (VIIBRYD) 20 mg tablet, Take 20 mg by mouth 2 (two) times a day , Disp: , Rfl:     clomiPRAMINE (ANAFRANIL) 25 mg capsule, Take 50 mg by mouth daily at bedtime, Disp: , Rfl:     meloxicam (MOBIC) 7 5 mg tablet, Take 1 tablet (7 5 mg total) by mouth 2 (two) times a day (Patient not taking: Reported on 8/13/2020), Disp: 60 tablet, Rfl: 0    Rexulti 2 MG tablet, Take 2 mg by mouth every morning , Disp: , Rfl:       Physical Exam:   BP 98/62 (BP Location: Left arm, Patient Position: Sitting, Cuff Size: Adult)   Pulse 94   Ht 5' 7" (1 702 m)   Wt 73 4 kg (161 lb 12 8 oz)   BMI 25 34 kg/m²   GEN: no acute distress    RESP: breathing comfortably with no accessory muscle use    ABD: soft, non-tender, non-distended   INCISION:    EXT: no significant peripheral edema   (Male): Penis circumcised, phallus normal, meatus patent  Testicles descended into scrotum bilaterally without masses nor tenderness  No inguinal hernias bilaterally  VIVIAN: Prostate is not enlarged at 30 grams  The prostate is not boggy  The prostate is not tender  No nodules noted      RADIOLOGY:    none     Assessment:    1   BPH     Plan:   - follow-up in 1 year with PSA prior to visit  -  -  -

## 2021-09-13 ENCOUNTER — APPOINTMENT (OUTPATIENT)
Dept: LAB | Facility: CLINIC | Age: 66
End: 2021-09-13
Payer: MEDICARE

## 2021-09-13 DIAGNOSIS — E55.9 AVITAMINOSIS D: ICD-10-CM

## 2021-09-13 DIAGNOSIS — M35.3 POLYMYALGIA RHEUMATICA (HCC): ICD-10-CM

## 2021-09-13 LAB
25(OH)D3 SERPL-MCNC: 33.2 NG/ML (ref 30–100)
ALBUMIN SERPL BCP-MCNC: 4.1 G/DL (ref 3.5–5)
ALP SERPL-CCNC: 100 U/L (ref 46–116)
ALT SERPL W P-5'-P-CCNC: 30 U/L (ref 12–78)
ANION GAP SERPL CALCULATED.3IONS-SCNC: 6 MMOL/L (ref 4–13)
AST SERPL W P-5'-P-CCNC: 20 U/L (ref 5–45)
BASOPHILS # BLD AUTO: 0.03 THOUSANDS/ΜL (ref 0–0.1)
BASOPHILS NFR BLD AUTO: 1 % (ref 0–1)
BILIRUB SERPL-MCNC: 0.71 MG/DL (ref 0.2–1)
BUN SERPL-MCNC: 14 MG/DL (ref 5–25)
CALCIUM SERPL-MCNC: 9.8 MG/DL (ref 8.3–10.1)
CHLORIDE SERPL-SCNC: 106 MMOL/L (ref 100–108)
CO2 SERPL-SCNC: 32 MMOL/L (ref 21–32)
CREAT SERPL-MCNC: 1.18 MG/DL (ref 0.6–1.3)
CRP SERPL QL: <3 MG/L
EOSINOPHIL # BLD AUTO: 0.05 THOUSAND/ΜL (ref 0–0.61)
EOSINOPHIL NFR BLD AUTO: 1 % (ref 0–6)
ERYTHROCYTE [DISTWIDTH] IN BLOOD BY AUTOMATED COUNT: 12.7 % (ref 11.6–15.1)
ERYTHROCYTE [SEDIMENTATION RATE] IN BLOOD: 1 MM/HOUR (ref 0–19)
GFR SERPL CREATININE-BSD FRML MDRD: 64 ML/MIN/1.73SQ M
GLUCOSE SERPL-MCNC: 116 MG/DL (ref 65–140)
HCT VFR BLD AUTO: 45.2 % (ref 36.5–49.3)
HGB BLD-MCNC: 15.2 G/DL (ref 12–17)
IMM GRANULOCYTES # BLD AUTO: 0.01 THOUSAND/UL (ref 0–0.2)
IMM GRANULOCYTES NFR BLD AUTO: 0 % (ref 0–2)
LYMPHOCYTES # BLD AUTO: 1.2 THOUSANDS/ΜL (ref 0.6–4.47)
LYMPHOCYTES NFR BLD AUTO: 24 % (ref 14–44)
MCH RBC QN AUTO: 30.5 PG (ref 26.8–34.3)
MCHC RBC AUTO-ENTMCNC: 33.6 G/DL (ref 31.4–37.4)
MCV RBC AUTO: 91 FL (ref 82–98)
MONOCYTES # BLD AUTO: 0.37 THOUSAND/ΜL (ref 0.17–1.22)
MONOCYTES NFR BLD AUTO: 7 % (ref 4–12)
NEUTROPHILS # BLD AUTO: 3.34 THOUSANDS/ΜL (ref 1.85–7.62)
NEUTS SEG NFR BLD AUTO: 67 % (ref 43–75)
NRBC BLD AUTO-RTO: 0 /100 WBCS
PLATELET # BLD AUTO: 229 THOUSANDS/UL (ref 149–390)
PMV BLD AUTO: 10 FL (ref 8.9–12.7)
POTASSIUM SERPL-SCNC: 4.6 MMOL/L (ref 3.5–5.3)
PROT SERPL-MCNC: 6.9 G/DL (ref 6.4–8.2)
RBC # BLD AUTO: 4.99 MILLION/UL (ref 3.88–5.62)
SODIUM SERPL-SCNC: 144 MMOL/L (ref 136–145)
URATE SERPL-MCNC: 5.5 MG/DL (ref 4.2–8)
WBC # BLD AUTO: 5 THOUSAND/UL (ref 4.31–10.16)

## 2021-09-13 PROCEDURE — 82306 VITAMIN D 25 HYDROXY: CPT

## 2021-09-13 PROCEDURE — 85652 RBC SED RATE AUTOMATED: CPT

## 2021-09-13 PROCEDURE — 80053 COMPREHEN METABOLIC PANEL: CPT

## 2021-09-13 PROCEDURE — 86430 RHEUMATOID FACTOR TEST QUAL: CPT

## 2021-09-13 PROCEDURE — 36415 COLL VENOUS BLD VENIPUNCTURE: CPT

## 2021-09-13 PROCEDURE — 86200 CCP ANTIBODY: CPT

## 2021-09-13 PROCEDURE — 85025 COMPLETE CBC W/AUTO DIFF WBC: CPT

## 2021-09-13 PROCEDURE — 86140 C-REACTIVE PROTEIN: CPT

## 2021-09-13 PROCEDURE — 84550 ASSAY OF BLOOD/URIC ACID: CPT

## 2021-09-14 LAB
CCP AB SER IA-ACNC: 1.1
RHEUMATOID FACT SER QL LA: NEGATIVE

## 2021-11-27 ENCOUNTER — IMMUNIZATIONS (OUTPATIENT)
Dept: FAMILY MEDICINE CLINIC | Facility: HOSPITAL | Age: 66
End: 2021-11-27

## 2021-11-27 DIAGNOSIS — Z23 ENCOUNTER FOR IMMUNIZATION: Primary | ICD-10-CM

## 2021-11-27 PROCEDURE — 91300 COVID-19 PFIZER VACC 0.3 ML: CPT

## 2021-11-27 PROCEDURE — 0001A COVID-19 PFIZER VACC 0.3 ML: CPT

## 2021-12-21 ENCOUNTER — PREP FOR PROCEDURE (OUTPATIENT)
Dept: GASTROENTEROLOGY | Facility: CLINIC | Age: 66
End: 2021-12-21

## 2021-12-21 ENCOUNTER — TELEPHONE (OUTPATIENT)
Dept: GASTROENTEROLOGY | Facility: CLINIC | Age: 66
End: 2021-12-21

## 2021-12-21 DIAGNOSIS — Z12.11 SCREENING FOR COLON CANCER: Primary | ICD-10-CM

## 2022-01-20 ENCOUNTER — APPOINTMENT (OUTPATIENT)
Dept: LAB | Facility: CLINIC | Age: 67
End: 2022-01-20
Payer: MEDICARE

## 2022-01-20 DIAGNOSIS — E78.5 HYPERLIPIDEMIA, UNSPECIFIED HYPERLIPIDEMIA TYPE: ICD-10-CM

## 2022-01-20 DIAGNOSIS — R53.1 ASTHENIA: ICD-10-CM

## 2022-01-20 DIAGNOSIS — R73.03 DIABETES MELLITUS, LATENT: ICD-10-CM

## 2022-01-20 LAB
ALBUMIN SERPL BCP-MCNC: 3.8 G/DL (ref 3.5–5)
ALP SERPL-CCNC: 94 U/L (ref 46–116)
ALT SERPL W P-5'-P-CCNC: 34 U/L (ref 12–78)
ANION GAP SERPL CALCULATED.3IONS-SCNC: 7 MMOL/L (ref 4–13)
AST SERPL W P-5'-P-CCNC: 22 U/L (ref 5–45)
BILIRUB SERPL-MCNC: 0.77 MG/DL (ref 0.2–1)
BUN SERPL-MCNC: 15 MG/DL (ref 5–25)
CALCIUM SERPL-MCNC: 9.2 MG/DL (ref 8.3–10.1)
CHLORIDE SERPL-SCNC: 103 MMOL/L (ref 100–108)
CHOLEST SERPL-MCNC: 186 MG/DL
CO2 SERPL-SCNC: 30 MMOL/L (ref 21–32)
CREAT SERPL-MCNC: 1.08 MG/DL (ref 0.6–1.3)
EST. AVERAGE GLUCOSE BLD GHB EST-MCNC: 131 MG/DL
GFR SERPL CREATININE-BSD FRML MDRD: 70 ML/MIN/1.73SQ M
GLUCOSE P FAST SERPL-MCNC: 99 MG/DL (ref 65–99)
HBA1C MFR BLD: 6.2 %
HDLC SERPL-MCNC: 47 MG/DL
LDLC SERPL CALC-MCNC: 119 MG/DL (ref 0–100)
NONHDLC SERPL-MCNC: 139 MG/DL
POTASSIUM SERPL-SCNC: 4 MMOL/L (ref 3.5–5.3)
PROT SERPL-MCNC: 6.5 G/DL (ref 6.4–8.2)
SODIUM SERPL-SCNC: 140 MMOL/L (ref 136–145)
TRIGL SERPL-MCNC: 99 MG/DL
TSH SERPL DL<=0.05 MIU/L-ACNC: 2.05 UIU/ML (ref 0.36–3.74)

## 2022-01-20 PROCEDURE — 36415 COLL VENOUS BLD VENIPUNCTURE: CPT

## 2022-01-20 PROCEDURE — 84443 ASSAY THYROID STIM HORMONE: CPT

## 2022-01-20 PROCEDURE — 83036 HEMOGLOBIN GLYCOSYLATED A1C: CPT

## 2022-01-20 PROCEDURE — 80061 LIPID PANEL: CPT

## 2022-01-20 PROCEDURE — 80053 COMPREHEN METABOLIC PANEL: CPT

## 2022-02-09 ENCOUNTER — HOSPITAL ENCOUNTER (OUTPATIENT)
Dept: GASTROENTEROLOGY | Facility: AMBULATORY SURGERY CENTER | Age: 67
Discharge: HOME | End: 2022-02-09
Payer: MEDICARE

## 2022-02-09 ENCOUNTER — TELEPHONE (OUTPATIENT)
Dept: GASTROENTEROLOGY | Facility: CLINIC | Age: 67
End: 2022-02-09

## 2022-02-09 ENCOUNTER — ANESTHESIA EVENT (OUTPATIENT)
Dept: GASTROENTEROLOGY | Facility: AMBULATORY SURGERY CENTER | Age: 67
End: 2022-02-09

## 2022-02-09 ENCOUNTER — ANESTHESIA (OUTPATIENT)
Dept: GASTROENTEROLOGY | Facility: AMBULATORY SURGERY CENTER | Age: 67
End: 2022-02-09

## 2022-02-09 VITALS
BODY MASS INDEX: 25.11 KG/M2 | HEART RATE: 85 BPM | DIASTOLIC BLOOD PRESSURE: 76 MMHG | RESPIRATION RATE: 18 BRPM | OXYGEN SATURATION: 98 % | SYSTOLIC BLOOD PRESSURE: 108 MMHG | WEIGHT: 160 LBS | TEMPERATURE: 96.4 F | HEIGHT: 67 IN

## 2022-02-09 DIAGNOSIS — Z12.11 SCREENING FOR COLON CANCER: ICD-10-CM

## 2022-02-09 PROCEDURE — 88305 TISSUE EXAM BY PATHOLOGIST: CPT | Performed by: PATHOLOGY

## 2022-02-09 PROCEDURE — 45385 COLONOSCOPY W/LESION REMOVAL: CPT | Performed by: INTERNAL MEDICINE

## 2022-02-09 PROCEDURE — 45380 COLONOSCOPY AND BIOPSY: CPT | Performed by: INTERNAL MEDICINE

## 2022-02-09 PROCEDURE — 00811 ANES LWR INTST NDSC NOS: CPT | Performed by: NURSE ANESTHETIST, CERTIFIED REGISTERED

## 2022-02-09 RX ORDER — PROPOFOL 10 MG/ML
INJECTION, EMULSION INTRAVENOUS AS NEEDED
Status: DISCONTINUED | OUTPATIENT
Start: 2022-02-09 | End: 2022-02-09

## 2022-02-09 RX ORDER — SODIUM CHLORIDE 9 MG/ML
30 INJECTION, SOLUTION INTRAVENOUS CONTINUOUS
Status: DISCONTINUED | OUTPATIENT
Start: 2022-02-09 | End: 2022-02-13 | Stop reason: HOSPADM

## 2022-02-09 RX ORDER — SODIUM CHLORIDE 9 MG/ML
INJECTION, SOLUTION INTRAVENOUS CONTINUOUS PRN
Status: DISCONTINUED | OUTPATIENT
Start: 2022-02-09 | End: 2022-02-09

## 2022-02-09 RX ORDER — DEXTROAMPHETAMINE SACCHARATE, AMPHETAMINE ASPARTATE MONOHYDRATE, DEXTROAMPHETAMINE SULFATE AND AMPHETAMINE SULFATE 5; 5; 5; 5 MG/1; MG/1; MG/1; MG/1
20 CAPSULE, EXTENDED RELEASE ORAL EVERY MORNING
COMMUNITY
Start: 2022-01-16

## 2022-02-09 RX ORDER — SODIUM CHLORIDE 9 MG/ML
20 INJECTION, SOLUTION INTRAVENOUS CONTINUOUS
Status: DISCONTINUED | OUTPATIENT
Start: 2022-02-09 | End: 2022-02-13 | Stop reason: HOSPADM

## 2022-02-09 RX ADMIN — PROPOFOL 100 MG: 10 INJECTION, EMULSION INTRAVENOUS at 10:53

## 2022-02-09 RX ADMIN — PROPOFOL 20 MG: 10 INJECTION, EMULSION INTRAVENOUS at 10:57

## 2022-02-09 RX ADMIN — SODIUM CHLORIDE: 9 INJECTION, SOLUTION INTRAVENOUS at 10:49

## 2022-02-09 RX ADMIN — PROPOFOL 20 MG: 10 INJECTION, EMULSION INTRAVENOUS at 11:03

## 2022-02-09 RX ADMIN — PROPOFOL 20 MG: 10 INJECTION, EMULSION INTRAVENOUS at 11:01

## 2022-02-09 RX ADMIN — PROPOFOL 20 MG: 10 INJECTION, EMULSION INTRAVENOUS at 11:00

## 2022-02-09 RX ADMIN — PROPOFOL 20 MG: 10 INJECTION, EMULSION INTRAVENOUS at 10:55

## 2022-02-09 NOTE — ANESTHESIA POSTPROCEDURE EVALUATION
Post-Op Assessment Note    CV Status:  Stable    Pain management: adequate     Mental Status:  Sleepy   Hydration Status:  Euvolemic   PONV Controlled:  Controlled   Airway Patency:  Patent      Post Op Vitals Reviewed: Yes      Staff: CRNA         No complications documented      BP   85/64   Temp     Pulse  85   Resp   10   SpO2   98

## 2022-02-09 NOTE — ANESTHESIA PREPROCEDURE EVALUATION
Procedure:  COLONOSCOPY    Relevant Problems   CARDIO   (+) HLD (hyperlipidemia)   (+) HTN (hypertension)   (+) Other chest pain      NEURO/PSYCH   (+) Acute nonintractable headache   (+) Anxiety   (+) History of temporal arteritis   (+) OCD (obsessive compulsive disorder)        Physical Exam    Airway    Mallampati score: III  TM Distance: >3 FB  Neck ROM: full     Dental       Cardiovascular  Rhythm: regular, Rate: normal,     Pulmonary  Pulmonary exam normal     Other Findings        Anesthesia Plan  ASA Score- 2     Anesthesia Type- IV sedation with anesthesia with ASA Monitors  Additional Monitors:   Airway Plan: NTT  Plan Factors-Exercise tolerance (METS): >4 METS  Chart reviewed  Patient summary reviewed  Induction- intravenous  Postoperative Plan-     Informed Consent- Anesthetic plan and risks discussed with patient

## 2022-02-09 NOTE — TELEPHONE ENCOUNTER
Patient left message that he had procedure today and will be receiving a follow up call in the morning between 7-8 am  Asking for them to call him on his cell so he doesn't disturb the rest of the house  Please have the nurse call him on [phone]  Thank you!

## 2022-02-09 NOTE — H&P
History and Physical -  [facility]  Williams Martini 78 y o  male MRN: [id number]                  HPI: Williams Martini is a 78y o  year old male who presents for screening colonoscopy  Last colonoscopy 10 years ago  REVIEW OF SYSTEMS: Per the HPI, and otherwise unremarkable      Historical Information   Past Medical History:   Diagnosis Date    Anxiety     Depression     Hyperlipidemia     Kidney stone     Polymyalgia rheumatica syndrome (HCC)     Psychiatric disorder     anxiety     Past Surgical History:   Procedure Laterality Date    APPENDECTOMY      COLONOSCOPY      KIDNEY STONE SURGERY      TONSILLECTOMY      TRANSURETHRAL RESECTION OF PROSTATE      WISDOM TOOTH EXTRACTION       Social History   Social History     Substance and Sexual Activity   Alcohol Use No     Social History     Substance and Sexual Activity   Drug Use No     Social History     Tobacco Use   Smoking Status Never Smoker   Smokeless Tobacco Never Used     Family History   Problem Relation Age of Onset    Diabetes Mother     Heart attack Father     Prostate cancer Father        Meds/Allergies       Current Outpatient Medications:     amphetamine-dextroamphetamine (ADDERALL XR) 20 MG 24 hr capsule    clomiPRAMINE (ANAFRANIL) 25 mg capsule    clonazePAM (KlonoPIN) 0 5 mg tablet    folic acid (FOLVITE) 1 mg tablet    methotrexate 2 5 MG tablet    Rexulti 2 MG tablet    simvastatin (ZOCOR) 5 MG tablet    vilazodone (VIIBRYD) 20 mg tablet    Current Facility-Administered Medications:     sodium chloride 0 9 % infusion, 30 mL/hr, Intravenous, Continuous    No Known Allergies    Objective     /67   Pulse 100   Temp (!) 96 4 °F (35 8 °C) (Skin)   Resp 18   Ht 5' 7" (1 702 m)   Wt 72 6 kg (160 lb)   SpO2 99%   BMI 25 06 kg/m²       PHYSICAL EXAM    Gen: NAD  Head: NCAT  CV: RRR  CHEST: Clear  ABD: soft, NT/ND  EXT: no edema      ASSESSMENT/PLAN:  This is a 78y o  year old male here for colonoscopy, and he is stable and optimized for his procedure

## 2022-02-09 NOTE — DISCHARGE INSTRUCTIONS
Colonoscopy   WHAT YOU NEED TO KNOW:   A colonoscopy is a procedure to examine the inside of your colon (intestine) with a scope  Polyps or tissue growths may have been removed during your colonoscopy  It is normal to feel bloated and to have some abdominal discomfort  You should be passing gas  If you have hemorrhoids or you had polyps removed, you may have a small amount of bleeding  DISCHARGE INSTRUCTIONS:   Seek care immediately if:    You have sudden, severe abdominal pain   You have problems swallowing   You have a large amount of black, sticky bowel movements or blood in your bowel movements   You have sudden trouble breathing   You feel weak, lightheaded, or faint or your heart beats faster than normal for you  Contact your healthcare provider if:    You have a fever and chills   You have nausea or are vomiting   Your abdomen is bloated or feels full and hard   You have abdominal pain   You have black, sticky bowel movements or blood in your bowel movements   You have not had a bowel movement for 3 days after your procedure   You have rash or hives   You have questions or concerns about your procedure  Activity:    Do not lift, strain, or run for 24 hours after your procedure   Rest after your procedure  You have been given medicine to relax you  Do not drive or make important decisions until the day after your procedure  Return to your normal activity as directed   Relieve gas and discomfort from bloating by lying on your right side with a heating pad on your abdomen  You may need to take short walks to help the gas move out  Eat small meals until bloating is relieved  Follow up with your healthcare provider as directed: Write down your questions so you remember to ask them during your visits  If you take a blood thinner, please review the specific instructions from your endoscopist about when you should resume it   These can be found in the Recommendation and Your Medication list sections of this After Visit Summary  Colorectal Polyps   WHAT YOU NEED TO KNOW:   Colorectal polyps are small growths of tissue in the lining of the colon and rectum  Most polyps are hyperplastic polyps and are usually benign (noncancerous)  Certain types of polyps, called adenomatous polyps, may turn into cancer  DISCHARGE INSTRUCTIONS:   Follow up with your healthcare provider or gastroenterologist as directed: You may need to return for more tests, such as another colonoscopy  Write down your questions so you remember to ask them during your visits  Reduce your risk for colorectal polyps:   · Eat a variety of healthy foods:  Healthy foods include fruit, vegetables, whole-grain breads, low-fat dairy products, beans, lean meat, and fish  Ask if you need to be on a special diet  · Maintain a healthy weight:  Ask your healthcare provider if you need to lose weight and how much you need to lose  Ask for help with a weight loss program     · Exercise:  Begin to exercise slowly and do more as you get stronger  Talk with your healthcare provider before you start an exercise program      · Limit alcohol:  Your risk for polyps increases the more you drink  · Do not smoke: If you smoke, it is never too late to quit  Ask for information about how to stop  For support and more information:   · [facility] [place] ([facility]) [address], [place] [address]  Phone: [phone]  Web Address: www digestive  niddk nih gov    Contact your healthcare provider or gastroenterologist if:   · You have a fever  · You have chills, a cough, or feel weak and achy  · You have abdominal pain that does not go away or gets worse after you take medicine  · Your abdomen is swollen  · You are losing weight without trying  · You have questions or concerns about your condition or care      Seek care immediately or call 911 if:   · You have sudden shortness of breath  · You have a fast heart rate, fast breathing, or are too dizzy to stand up  · You have severe abdominal pain  · You see blood in your bowel movement  © Copyright [address] Information is for End User's use only and may not be sold, redistributed or otherwise used for commercial purposes  All illustrations and images included in CareNotes® are the copyrighted property of A D A M , Inc  or [facility]   The above information is an  only  It is not intended as medical advice for individual conditions or treatments  Talk to your doctor, nurse or pharmacist before following any medical regimen to see if it is safe and effective for you  Hemorrhoids   WHAT YOU NEED TO KNOW:   What are hemorrhoids? Hemorrhoids are swollen blood vessels inside your rectum (internal hemorrhoids) or on your anus (external hemorrhoids)  Sometimes a hemorrhoid may prolapse  This means it extends out of your anus  What increases my risk for hemorrhoids? · Pregnancy or obesity    · Straining or sitting for a long time during bowel movements    · Liver disease    · Weak muscles around the anus caused by older age, rectal surgery, or anal intercourse    · A lack of physical activity    · Chronic diarrhea or constipation    · A low-fiber diet    What are the signs and symptoms of hemorrhoids? · Pain or itching around your anus or inside your rectum    · Swelling or bumps around your anus    · Bright red blood in your bowel movement, on the toilet paper, or in the toilet bowl    · Tissue bulging out of your anus (prolapsed hemorrhoids)    · Incontinence (poor control over urine or bowel movements)    How are hemorrhoids diagnosed? Your healthcare provider will ask about your symptoms, the foods you eat, and your bowel movements  He or she will examine your anus for external hemorrhoids   You may need the following:  · A digital rectal exam  is a test to check for hemorrhoids  Your healthcare provider will put a gloved finger inside your anus to feel for the hemorrhoids  · An anoscopy  is a test that uses a scope (small tube with a light and camera on the end) to look at your hemorrhoids  How are hemorrhoids treated? Treatment will depend on your symptoms  You may need any of the following:  · Medicines  can help decrease pain and swelling, and soften your bowel movement  The medicine may be a pill, pad, cream, or ointment  · Procedures  may be used to shrink or remove your hemorrhoid  Examples include rubber-band ligation, sclerotherapy, and photocoagulation  These procedures may be done in your healthcare provider's office  Ask your healthcare provider for more information about these procedures  · Surgery  may be needed to shrink or remove your hemorrhoids  How can I manage my symptoms? · Apply ice on your anus for 15 to 20 minutes every hour or as directed  Use an ice pack, or put crushed ice in a plastic bag  Cover it with a towel before you apply it to your anus  Ice helps prevent tissue damage and decreases swelling and pain  · Take a sitz bath  Fill a bathtub with 4 to 6 inches of warm water  You may also use a sitz bath pan that fits inside a toilet bowl  Sit in the sitz bath for 15 minutes  Do this 3 times a day, and after each bowel movement  The warm water can help decrease pain and swelling  · Keep your anal area clean  Gently wash the area with warm water daily  Soap may irritate the area  After a bowel movement, wipe with moist towelettes or wet toilet paper  Dry toilet paper can irritate the area  How can I help prevent hemorrhoids? · Do not strain to have a bowel movement  Do not sit on the toilet too long  These actions can increase pressure on the tissues in your rectum and anus  · Drink plenty of liquids  Liquids can help prevent constipation   Ask how much liquid to drink each day and which liquids are best for you  · Eat a variety of high-fiber foods  Examples include fruits, vegetables, and whole grains  Ask your healthcare provider how much fiber you need each day  You may need to take a fiber supplement  · Exercise as directed  Exercise, such as walking, may make it easier to have a bowel movement  Ask your healthcare provider to help you create an exercise plan  · Do not have anal sex  Anal sex can weaken the skin around your rectum and anus  · Avoid heavy lifting  This can cause straining and increase your risk for another hemorrhoid  When should I seek immediate care? · You have severe pain in your rectum or around your anus  · You have severe pain in your abdomen and you are vomiting  · You have bleeding from your anus that soaks through your underwear  When should I contact my healthcare provider? · You have frequent and painful bowel movements  · Your hemorrhoid looks or feels more swollen than usual      · You do not have a bowel movement for 2 days or more  · You see or feel tissue coming through your anus  · You have questions or concerns about your condition or care  CARE AGREEMENT:   You have the right to help plan your care  Learn about your health condition and how it may be treated  Discuss treatment options with your healthcare providers to decide what care you want to receive  You always have the right to refuse treatment  The above information is an  only  It is not intended as medical advice for individual conditions or treatments  Talk to your doctor, nurse or pharmacist before following any medical regimen to see if it is safe and effective for you  © Copyright [employer] 2021 Information is for End User's use only and may not be sold, redistributed or otherwise used for commercial purposes   All illustrations and images included in CareNotes® are the copyrighted property of [employer]  or [address]   High Fiber Diet   WHAT YOU NEED TO KNOW:   What is a high-fiber diet? A high-fiber diet includes foods that have a high amount of fiber  Fiber is the part of fruits, vegetables, and grains that is not broken down by your body  Fiber keeps your bowel movements regular  Fiber can also help lower your cholesterol level, control blood sugar in people with diabetes, and relieve constipation  Fiber can also help you control your weight because it helps you feel full faster  Most adults should eat 25 to 35 grams of fiber each day  Talk to your dietitian or healthcare provider about the amount of fiber you need  What foods are good sources of fiber? · Foods with at least 4 grams of fiber per serving:      ? ? to ½ cup of high-fiber cereal (check the nutrition label on the box)    ? ½ cup of blackberries or raspberries    ? 4 dried prunes    ? 1 cooked artichoke    ? ½ cup of cooked legumes, such as lentils, or red, kidney, and hargrove beans    · Foods with 1 to 3 grams of fiber per serving:      ? 1 slice of whole-wheat, pumpernickel, or rye bread    ? ½ cup of cooked brown rice    ? 4 whole-wheat crackers    ? 1 cup of oatmeal    ? ½ cup of cereal with 1 to 3 grams of fiber per serving (check the nutrition label on the box)    ? 1 small piece of fruit, such as an apple, banana, pear, kiwi, or orange    ? 3 dates    ? ½ cup of canned apricots, fruit cocktail, peaches, or pears    ? ½ cup of raw or cooked vegetables, such as carrots, cauliflower, cabbage, spinach, squash, or corn    What are some ways that I can increase fiber in my diet? · Choose brown or wild rice instead of white rice  · Use whole wheat flour in recipes instead of white or all-purpose flour  · Add beans and peas to casseroles or soups  · Choose fresh fruit and vegetables with peels or skins on instead of juices  What other guidelines should I follow? · Add fiber to your diet slowly    You may have abdominal discomfort, bloating, and gas if you add fiber to your diet too quickly  · Drink plenty of liquids as you add fiber to your diet  You may have nausea or develop constipation if you do not drink enough water  Ask how much liquid to drink each day and which liquids are best for you  CARE AGREEMENT:   You have the right to help plan your care  Discuss treatment options with your healthcare provider to decide what care you want to receive  You always have the right to refuse treatment  The above information is an  only  It is not intended as medical advice for individual conditions or treatments  Talk to your doctor, nurse or pharmacist before following any medical regimen to see if it is safe and effective for you  © Copyright [employer] 2021 Information is for End User's use only and may not be sold, redistributed or otherwise used for commercial purposes   All illustrations and images included in CareNotes® are the copyrighted property of A D A M , Inc  or [address] [place] [address]

## 2022-02-09 NOTE — INTERVAL H&P NOTE
H&P reviewed  After examining the patient I find no changes in the patients condition since the H&P had been written      Vitals:    02/09/22 1034   BP: 102/67   Pulse: 100   Resp: 18   Temp: (!) 96 4 °F (35 8 °C)   SpO2: 99%

## 2022-05-01 ENCOUNTER — NURSE TRIAGE (OUTPATIENT)
Dept: OTHER | Facility: OTHER | Age: 67
End: 2022-05-01

## 2022-05-01 DIAGNOSIS — R39.9 UTI SYMPTOMS: Primary | ICD-10-CM

## 2022-05-01 NOTE — TELEPHONE ENCOUNTER
Regarding: possible UTI - would like an abx  ----- Message from Abril Reeves, [address] sent at 5/1/2022  6:18 PM EDT -----  "I'm having burning on the tip of my penis It has been going on since Tuesday  PCP advised to water to flush out  Darker today thank earlier in the week  It was not as clear   No fever, No flank pain"

## 2022-05-01 NOTE — TELEPHONE ENCOUNTER
Reason for Disposition   All other males with painful urination    Additional Information   Pain or burning with passing urine is main symptom   Negative: All other urine symptoms    Answer Assessment - Initial Assessment Questions  1  SYMPTOM: "What's the main symptom you're concerned about?" (e g , frequency, incontinence)      Burning on tip of penis  2  ONSET: "When did the  burning  start?"      Started Tuesday- saw PCP Thursday for normal checkup and he dipped urine and it was negative  Told to drink more fluids  3  PAIN: "Is there any pain?" If Yes, ask: "How bad is it?" (Scale: 1-10; mild, moderate, severe)      # 7-8 when he urinates  All the time -#5-6  4  CAUSE: "What do you think is causing the symptoms?"      UTI  5  OTHER SYMPTOMS: "Do you have any other symptoms?" (e g , fever, flank pain, blood in urine, pain with urination)      Urgency, no frequency,fever, blood in urine, or flank pain  Urine looks darker in color  6  PREGNANCY: "Is there any chance you are pregnant?" "When was your last menstrual period?"      N/A    Answer Assessment - Initial Assessment Questions  1  SEVERITY: "How bad is the pain?"  (e g , Scale 1-10; mild, moderate, or severe)    - MILD (1-3): complains slightly about urination hurting    - MODERATE (4-7): interferes with normal activities      - SEVERE (8-10): excruciating, unwilling or unable to urinate because of the pain       *No Answer*  2  FREQUENCY: "How many times have you had painful urination today?"       *No Answer*  3  PATTERN: "Is pain present every time you urinate or just sometimes?"       *No Answer*  4  ONSET: "When did the painful urination start?"       *No Answer*  5  FEVER: "Do you have a fever?" If Yes, ask: "What is your temperature, how was it measured, and when did it start?"      *No Answer*  6  PAST UTI: "Have you had a urine infection before?" If Yes, ask: "When was the last time?" and "What happened that time?"       *No Answer*  7  CAUSE: "What do you think is causing the painful urination?"       *No Answer*  8  OTHER SYMPTOMS: "Do you have any other symptoms?" (e g , flank pain, penile discharge, scrotal pain, blood in urine)      *No Answer*    Protocols used: URINATION PAIN - MALE-ADULT-AH, PENIS AND SCROTUM SYMPTOMS-ADULT-AH, URINARY SYMPTOMS-ADULT-AH

## 2022-05-01 NOTE — TELEPHONE ENCOUNTER
Patient calling because his burning is getting worse and his urine is looking darker, not foul smelling  He has been forcing water to drink  He would like an appointment to be seen  I put an order in for a UA with Micro and Urine culture to be obtained tomorrow morning  Appointment given for Tuesday at [facility] with Arturo Verdugo PA-C  Patient knows to call the office back tomorrow if his symptoms worsen and maybe they can fit him into the schedule tomorrow  If not he states that he can wait until Tuesday morning  Now it is full for me to see  He was very pleased for the service that he received this evening

## 2022-05-02 ENCOUNTER — APPOINTMENT (OUTPATIENT)
Dept: LAB | Facility: CLINIC | Age: 67
End: 2022-05-02
Payer: MEDICARE

## 2022-05-02 DIAGNOSIS — R39.9 UTI SYMPTOMS: ICD-10-CM

## 2022-05-02 LAB
BACTERIA UR QL AUTO: ABNORMAL /HPF
BILIRUB UR QL STRIP: NEGATIVE
CLARITY UR: CLEAR
COLOR UR: YELLOW
GLUCOSE UR STRIP-MCNC: NEGATIVE MG/DL
HGB UR QL STRIP.AUTO: NEGATIVE
KETONES UR STRIP-MCNC: NEGATIVE MG/DL
LEUKOCYTE ESTERASE UR QL STRIP: ABNORMAL
NITRITE UR QL STRIP: NEGATIVE
NON-SQ EPI CELLS URNS QL MICRO: ABNORMAL /HPF
PH UR STRIP.AUTO: 6 [PH]
PROT UR STRIP-MCNC: NEGATIVE MG/DL
RBC #/AREA URNS AUTO: ABNORMAL /HPF
SP GR UR STRIP.AUTO: <=1.005 (ref 1–1.03)
UROBILINOGEN UR QL STRIP.AUTO: 0.2 E.U./DL
WBC #/AREA URNS AUTO: ABNORMAL /HPF

## 2022-05-02 PROCEDURE — 87086 URINE CULTURE/COLONY COUNT: CPT

## 2022-05-02 PROCEDURE — 81001 URINALYSIS AUTO W/SCOPE: CPT

## 2022-05-03 ENCOUNTER — OFFICE VISIT (OUTPATIENT)
Dept: UROLOGY | Facility: CLINIC | Age: 67
End: 2022-05-03
Payer: MEDICARE

## 2022-05-03 VITALS
RESPIRATION RATE: 16 BRPM | BODY MASS INDEX: 25.06 KG/M2 | HEIGHT: 67 IN | SYSTOLIC BLOOD PRESSURE: 110 MMHG | DIASTOLIC BLOOD PRESSURE: 68 MMHG | OXYGEN SATURATION: 99 % | HEART RATE: 78 BPM

## 2022-05-03 DIAGNOSIS — N40.1 BPH WITH OBSTRUCTION/LOWER URINARY TRACT SYMPTOMS: Primary | ICD-10-CM

## 2022-05-03 DIAGNOSIS — N13.8 BPH WITH OBSTRUCTION/LOWER URINARY TRACT SYMPTOMS: Primary | ICD-10-CM

## 2022-05-03 DIAGNOSIS — N41.9 PROSTATITIS, UNSPECIFIED PROSTATITIS TYPE: ICD-10-CM

## 2022-05-03 LAB
BACTERIA UR CULT: ABNORMAL
SL AMB  POCT GLUCOSE, UA: NORMAL
SL AMB LEUKOCYTE ESTERASE,UA: NORMAL
SL AMB POCT BILIRUBIN,UA: NORMAL
SL AMB POCT BLOOD,UA: NORMAL
SL AMB POCT CLARITY,UA: CLEAR
SL AMB POCT COLOR,UA: YELLOW
SL AMB POCT KETONES,UA: NORMAL
SL AMB POCT NITRITE,UA: NORMAL
SL AMB POCT PH,UA: 5
SL AMB POCT SPECIFIC GRAVITY,UA: 1.02
SL AMB POCT URINE PROTEIN: NORMAL
SL AMB POCT UROBILINOGEN: 0.2

## 2022-05-03 PROCEDURE — 1124F ACP DISCUSS-NO DSCNMKR DOCD: CPT | Performed by: PHYSICIAN ASSISTANT

## 2022-05-03 PROCEDURE — 99213 OFFICE O/P EST LOW 20 MIN: CPT | Performed by: PHYSICIAN ASSISTANT

## 2022-05-03 PROCEDURE — 81002 URINALYSIS NONAUTO W/O SCOPE: CPT | Performed by: PHYSICIAN ASSISTANT

## 2022-05-03 RX ORDER — SIMVASTATIN 10 MG
10 TABLET ORAL
COMMUNITY
Start: 2022-02-07 | End: 2022-05-18 | Stop reason: SDUPTHER

## 2022-05-03 RX ORDER — LEVOFLOXACIN 500 MG/1
500 TABLET, FILM COATED ORAL EVERY 24 HOURS
Qty: 14 TABLET | Refills: 0 | Status: SHIPPED | OUTPATIENT
Start: 2022-05-03 | End: 2022-05-18

## 2022-05-03 RX ORDER — TAMSULOSIN HYDROCHLORIDE 0.4 MG/1
0.4 CAPSULE ORAL
Qty: 15 CAPSULE | Refills: 3 | Status: SHIPPED | OUTPATIENT
Start: 2022-05-03 | End: 2022-05-18

## 2022-05-03 NOTE — PROGRESS NOTES
UROLOGY PROGRESS NOTE   Patient Identifiers: Anastasia Dill (MRN [id number])  Date of Service: 5/3/2022    Subjective:     69-year-old man history of BPH  He had a TURP in 2014  He complains of several week history of burning with urination  He has no fever or gross hematuria  Urine shows trace microscopic blood and leukocytes  He has some urinary frequency  He is not on any medications        Reason for visit:  Dysuria    Objective:     VITALS:    Vitals:    05/03/22 0805   BP: 110/68   Pulse: 78   Resp: 16   SpO2: 99%           LABS:  Lab Results   Component Value Date    HGB 15 2 09/13/2021    HCT 45 2 09/13/2021    WBC 5 00 09/13/2021     09/13/2021   ]    Lab Results   Component Value Date     11/21/2015    K 4 0 01/20/2022     01/20/2022    CO2 30 01/20/2022    BUN 15 01/20/2022    CREATININE 1 08 01/20/2022    CALCIUM 9 2 01/20/2022    GLUCOSE 92 11/21/2015   ]        INPATIENT MEDS:    Current Outpatient Medications:     amphetamine-dextroamphetamine (ADDERALL XR) 20 MG 24 hr capsule, , Disp: , Rfl:     clonazePAM (KlonoPIN) 0 5 mg tablet, Take 0 5 mg by mouth 2 (two) times a day as needed for seizures, Disp: , Rfl:     folic acid (FOLVITE) 1 mg tablet, Take 1 mg by mouth daily, Disp: , Rfl:     methotrexate 2 5 MG tablet, Take by mouth 5 tablets on Sundays , Disp: , Rfl:     simvastatin (ZOCOR) 10 mg tablet, , Disp: , Rfl:     vilazodone (VIIBRYD) 20 mg tablet, Take 20 mg by mouth 2 (two) times a day , Disp: , Rfl:     clomiPRAMINE (ANAFRANIL) 25 mg capsule, Take 50 mg by mouth daily at bedtime, Disp: , Rfl:     levofloxacin (LEVAQUIN) 500 mg tablet, Take 1 tablet (500 mg total) by mouth every 24 hours for 14 days, Disp: 14 tablet, Rfl: 0    Rexulti 2 MG tablet, Take 2 mg by mouth every morning , Disp: , Rfl:     tamsulosin (FLOMAX) 0 4 mg, Take 1 capsule (0 4 mg total) by mouth daily with dinner, Disp: 15 capsule, Rfl: 3      Physical Exam:   /68 (BP Location: Left arm, Patient Position: Sitting, Cuff Size: Adult)   Pulse 78   Resp 16   Ht 5' 7" (1 702 m)   SpO2 99%   BMI 25 06 kg/m²   GEN: no acute distress    RESP: breathing comfortably with no accessory muscle use    ABD: soft, non-tender, non-distended   INCISION:    EXT: no significant peripheral edema   (Male): Penis circumcised, phallus normal, meatus patent  Testicles descended into scrotum bilaterally without masses nor tenderness  No inguinal hernias bilaterally  VIVIAN: Prostate is enlarged at 35 grams  The prostate is boggy  The prostate is tender  No nodules noted      RADIOLOGY:    none     Assessment:    1  Prostatitis   2    BPH     Plan:   - prescription for Levaquin and tamsulosin sent to his pharmacy  - recommend ibuprofen or Tylenol for discomfort as well as warm soaks in the tub  - he should call me if he does not experience improvement  - otherwise follow-up in August with me for his annual exam

## 2022-05-04 NOTE — TELEPHONE ENCOUNTER
Urine culture  Order: [id number]   Dx: UTI symptoms    Specimen Information: Urine         0 Result Notes    Urine Culture <10,000 cfu/ml Gram Positive Cocci Abnormal            Patient had OV to see Abril Floyd yesterday 5/3/22 and this is the plan    Plan:   - prescription for Levaquin and tamsulosin sent to his pharmacy  - recommend ibuprofen or Tylenol for discomfort as well as warm soaks in the tub  - he should call me if he does not experience improvement  - otherwise follow-up in August with me for his annual exam

## 2022-05-18 ENCOUNTER — OFFICE VISIT (OUTPATIENT)
Dept: INTERNAL MEDICINE CLINIC | Facility: CLINIC | Age: 67
End: 2022-05-18
Payer: MEDICARE

## 2022-05-18 VITALS
TEMPERATURE: 97.9 F | HEART RATE: 95 BPM | WEIGHT: 160 LBS | HEIGHT: 66 IN | OXYGEN SATURATION: 99 % | SYSTOLIC BLOOD PRESSURE: 112 MMHG | DIASTOLIC BLOOD PRESSURE: 68 MMHG | BODY MASS INDEX: 25.71 KG/M2

## 2022-05-18 DIAGNOSIS — F42.8 OTHER OBSESSIVE-COMPULSIVE DISORDERS: Chronic | ICD-10-CM

## 2022-05-18 DIAGNOSIS — F41.9 ANXIETY: Chronic | ICD-10-CM

## 2022-05-18 DIAGNOSIS — M35.3 POLYMYALGIA RHEUMATICA SYNDROME (HCC): ICD-10-CM

## 2022-05-18 DIAGNOSIS — R73.01 IMPAIRED FASTING BLOOD SUGAR: ICD-10-CM

## 2022-05-18 DIAGNOSIS — E22.2 ADH DISORDER (HCC): Chronic | ICD-10-CM

## 2022-05-18 DIAGNOSIS — E78.2 MIXED HYPERLIPIDEMIA: Primary | ICD-10-CM

## 2022-05-18 PROCEDURE — 99214 OFFICE O/P EST MOD 30 MIN: CPT | Performed by: INTERNAL MEDICINE

## 2022-05-18 RX ORDER — VILAZODONE HYDROCHLORIDE 40 MG/1
20 TABLET ORAL 2 TIMES DAILY
COMMUNITY
Start: 2020-12-09

## 2022-05-18 RX ORDER — SIMVASTATIN 10 MG
10 TABLET ORAL
Qty: 90 TABLET | Refills: 1 | Status: SHIPPED | OUTPATIENT
Start: 2022-05-18 | End: 2022-07-20 | Stop reason: SDUPTHER

## 2022-05-18 NOTE — PROGRESS NOTES
Assessment/Plan:    BMI Counseling: Body mass index is 25 82 kg/m²  The BMI is above normal  Nutrition recommendations include decreasing portion sizes, encouraging healthy choices of fruits and vegetables and decreasing fast food intake  Exercise recommendations include moderate physical activity 150 minutes/week  Rationale for BMI follow-up plan is due to patient being overweight or obese  Depression Screening and Follow-up Plan: Patient's depression screening was positive with a PHQ-2 score of 4  Their PHQ-9 score was 8              1  Mixed hyperlipidemia  -     Comprehensive metabolic panel; Future  -     Lipid Panel with Direct LDL reflex; Future  -     TSH, 3rd generation; Future  -     simvastatin (ZOCOR) 10 mg tablet; Take 1 tablet (10 mg total) by mouth daily at bedtime    2  Other obsessive-compulsive disorders    3  Polymyalgia rheumatica syndrome ([address] )    4  Anxiety    5  ADH disorder ([address] )    6  Impaired fasting blood sugar  -     CBC and differential; Future  -     Hemoglobin A1C; Future           Subjective:      Patient ID: Michelle Cui is a 78 y o  male  Follow-up on multiple medical problems to ensure the stable on current medications      The following portions of the patient's history were reviewed and updated as appropriate: He  has a past medical history of Anxiety, Depression, Hyperlipidemia, Kidney stone, Polymyalgia rheumatica syndrome ([address] ), and Psychiatric disorder    He   Patient Active Problem List    Diagnosis Date Noted    Polymyalgia rheumatica syndrome ([address] ) 05/18/2022    Impaired fasting blood sugar 05/18/2022    Other chest pain 11/17/2020    Pain in left knee 05/23/2018    Left facial numbness 12/18/2016    History of temporal arteritis 12/18/2016    HTN (hypertension) 12/18/2016    HLD (hyperlipidemia) 12/18/2016    Acute nonintractable headache 12/18/2016    OCD (obsessive compulsive disorder) 12/18/2016    Anxiety 12/18/2016    ADH disorder ([address] ) 12/18/2016     He  has a past surgical history that includes Appendectomy; Tonsillectomy; Kidney stone surgery; Transurethral resection of prostate; [place] tooth extraction; and Colonoscopy  His family history includes Diabetes in his mother; Heart attack in his father; Prostate cancer in his father  He  reports that he has never smoked  He has never used smokeless tobacco  He reports that he does not drink alcohol and does not use drugs  Current Outpatient Medications   Medication Sig Dispense Refill    amphetamine-dextroamphetamine (ADDERALL XR) 20 MG 24 hr capsule Take 20 mg by mouth every morning      Brexpiprazole (REXULTI) 1 MG tablet Take 0 5 mg by mouth in the morning and 0 5 mg in the evening   clonazePAM (KlonoPIN) 0 5 mg tablet Take 0 5 mg by mouth 2 (two) times a day as needed for seizures      folic acid (FOLVITE) 1 mg tablet Take 1 mg by mouth daily      methotrexate 2 5 MG tablet Take by mouth 5 tablets on Sundays       simvastatin (ZOCOR) 10 mg tablet Take 1 tablet (10 mg total) by mouth daily at bedtime 90 tablet 1    vilazodone (VIIBRYD) 40 mg tablet Take 20 mg by mouth daily with breakfast Pt taking a half a pill       No current facility-administered medications for this visit  Current Outpatient Medications on File Prior to Visit   Medication Sig    amphetamine-dextroamphetamine (ADDERALL XR) 20 MG 24 hr capsule Take 20 mg by mouth every morning    Brexpiprazole (REXULTI) 1 MG tablet Take 0 5 mg by mouth in the morning and 0 5 mg in the evening      clonazePAM (KlonoPIN) 0 5 mg tablet Take 0 5 mg by mouth 2 (two) times a day as needed for seizures    folic acid (FOLVITE) 1 mg tablet Take 1 mg by mouth daily    methotrexate 2 5 MG tablet Take by mouth 5 tablets on Sundays     vilazodone (VIIBRYD) 40 mg tablet Take 20 mg by mouth daily with breakfast Pt taking a half a pill    [DISCONTINUED] simvastatin (ZOCOR) 10 mg tablet Take 10 mg by mouth daily at bedtime    [DISCONTINUED] clomiPRAMINE (ANAFRANIL) 25 mg capsule Take 50 mg by mouth daily at bedtime    [DISCONTINUED] levofloxacin (LEVAQUIN) 500 mg tablet Take 1 tablet (500 mg total) by mouth every 24 hours for 14 days (Patient not taking: Reported on 5/18/2022)    [DISCONTINUED] Rexulti 2 MG tablet Take 2 mg by mouth every morning     [DISCONTINUED] tamsulosin (FLOMAX) 0 4 mg Take 1 capsule (0 4 mg total) by mouth daily with dinner (Patient not taking: Reported on 5/18/2022)    [DISCONTINUED] vilazodone (VIIBRYD) 20 mg tablet Take 20 mg by mouth 2 (two) times a day      No current facility-administered medications on file prior to visit  He is allergic to tamsulosin       Review of Systems   Constitutional: Negative for chills and fever  HENT: Negative for congestion, ear pain and sore throat  Eyes: Negative for pain  Respiratory: Negative for cough and shortness of breath  Cardiovascular: Negative for chest pain and leg swelling  Gastrointestinal: Negative for abdominal pain, nausea and vomiting  Endocrine: Negative for polyuria  Genitourinary: Negative for difficulty urinating, frequency and urgency  Musculoskeletal: Negative for arthralgias and back pain  Skin: Negative for rash  Neurological: Negative for weakness and headaches  Psychiatric/Behavioral: Negative for sleep disturbance  The patient is not nervous/anxious            Objective:      /68 (BP Location: Left arm, Patient Position: Sitting, Cuff Size: Adult)   Pulse 95   Temp 97 9 °F (36 6 °C) (Temporal)   Ht 5' 6" (1 676 m) Comment: with out shoes  Wt 72 6 kg (160 lb)   SpO2 99%   BMI 25 82 kg/m²     Recent Results (from the past 1344 hour(s))   Urinalysis with microscopic    Collection Time: 05/02/22  8:34 AM   Result Value Ref Range    Clarity, UA Clear     Color, UA Yellow     Specific Gravity, UA <=1 005 1 003 - 1 030    pH, UA 6 0 4 5, 5 0, 5 5, 6 0, 6 5, 7 0, 7 5, 8 0    Glucose, UA Negative Negative mg/dl Ketones, UA Negative Negative mg/dl    Blood, UA Negative Negative    Protein, UA Negative Negative mg/dl    Nitrite, UA Negative Negative    Bilirubin, UA Negative Negative    Urobilinogen, UA 0 2 0 2, 1 0 E U /dl E U /dl    Leukocytes, UA Small (A) Negative    WBC, UA 2-4 None Seen, 2-4, 5-60 /hpf    RBC, UA 0-1 (A) None Seen, 2-4 /hpf    Bacteria, UA Occasional None Seen, Occasional /hpf    Epithelial Cells None Seen None Seen, Occasional /hpf   Urine culture    Collection Time: 05/02/22  8:34 AM    Specimen: Urine   Result Value Ref Range    Urine Culture <10,000 cfu/ml Gram Positive Cocci (A)    POCT urine dip    Collection Time: 05/03/22  8:07 AM   Result Value Ref Range    LEUKOCYTE ESTERASE,UA ++     NITRITE,UA -     SL AMB POCT UROBILINOGEN 0 2     POCT URINE PROTEIN trace      PH,UA 5 0     BLOOD,UA trace     SPECIFIC GRAVITY,UA 1 020     KETONES,UA -     BILIRUBIN,UA -     GLUCOSE, UA -      COLOR,UA yellow     CLARITY,UA clear         Physical Exam  Constitutional:       Appearance: Normal appearance  HENT:      Head: Normocephalic  Right Ear: Tympanic membrane, ear canal and external ear normal       Left Ear: Tympanic membrane, ear canal and external ear normal       Nose: Nose normal  No congestion  Mouth/Throat:      Mouth: Mucous membranes are moist       Pharynx: Oropharynx is clear  No oropharyngeal exudate or posterior oropharyngeal erythema  Eyes:      Extraocular Movements: Extraocular movements intact  Conjunctiva/sclera: Conjunctivae normal       Pupils: Pupils are equal, round, and reactive to light  Cardiovascular:      Rate and Rhythm: Normal rate and regular rhythm  Heart sounds: Normal heart sounds  No murmur heard  Pulmonary:      Effort: Pulmonary effort is normal       Breath sounds: Normal breath sounds  No wheezing or rales  Abdominal:      General: Bowel sounds are normal  There is no distension  Palpations: Abdomen is soft  Tenderness:  There is no abdominal tenderness  Musculoskeletal:         General: Normal range of motion  Cervical back: Normal range of motion and neck supple  Right lower leg: No edema  Left lower leg: No edema  Lymphadenopathy:      Cervical: No cervical adenopathy  Skin:     General: Skin is warm  Neurological:      General: No focal deficit present  Mental Status: He is alert and oriented to person, place, and time

## 2022-06-22 ENCOUNTER — TELEPHONE (OUTPATIENT)
Dept: INTERNAL MEDICINE CLINIC | Facility: CLINIC | Age: 67
End: 2022-06-22

## 2022-06-22 NOTE — TELEPHONE ENCOUNTER
Patient called and wanted to know if he needs and labs before his visit on july 20th if so can he put orders in system

## 2022-07-11 ENCOUNTER — APPOINTMENT (OUTPATIENT)
Dept: LAB | Facility: CLINIC | Age: 67
End: 2022-07-11
Payer: MEDICARE

## 2022-07-11 DIAGNOSIS — E78.2 MIXED HYPERLIPIDEMIA: ICD-10-CM

## 2022-07-11 DIAGNOSIS — R73.01 IMPAIRED FASTING BLOOD SUGAR: ICD-10-CM

## 2022-07-11 LAB
ALBUMIN SERPL BCP-MCNC: 4.2 G/DL (ref 3.5–5)
ALP SERPL-CCNC: 84 U/L (ref 34–104)
ALT SERPL W P-5'-P-CCNC: 21 U/L (ref 7–52)
ANION GAP SERPL CALCULATED.3IONS-SCNC: 5 MMOL/L (ref 4–13)
AST SERPL W P-5'-P-CCNC: 20 U/L (ref 13–39)
BASOPHILS # BLD AUTO: 0.03 THOUSANDS/ΜL (ref 0–0.1)
BASOPHILS NFR BLD AUTO: 1 % (ref 0–1)
BILIRUB SERPL-MCNC: 0.77 MG/DL (ref 0.2–1)
BUN SERPL-MCNC: 17 MG/DL (ref 5–25)
CALCIUM SERPL-MCNC: 9.5 MG/DL (ref 8.4–10.2)
CHLORIDE SERPL-SCNC: 105 MMOL/L (ref 96–108)
CHOLEST SERPL-MCNC: 173 MG/DL
CO2 SERPL-SCNC: 31 MMOL/L (ref 21–32)
CREAT SERPL-MCNC: 0.98 MG/DL (ref 0.6–1.3)
EOSINOPHIL # BLD AUTO: 0.13 THOUSAND/ΜL (ref 0–0.61)
EOSINOPHIL NFR BLD AUTO: 3 % (ref 0–6)
ERYTHROCYTE [DISTWIDTH] IN BLOOD BY AUTOMATED COUNT: 13.4 % (ref 11.6–15.1)
EST. AVERAGE GLUCOSE BLD GHB EST-MCNC: 128 MG/DL
GFR SERPL CREATININE-BSD FRML MDRD: 79 ML/MIN/1.73SQ M
GLUCOSE P FAST SERPL-MCNC: 117 MG/DL (ref 65–99)
HBA1C MFR BLD: 6.1 %
HCT VFR BLD AUTO: 46.4 % (ref 36.5–49.3)
HDLC SERPL-MCNC: 47 MG/DL
HGB BLD-MCNC: 15.6 G/DL (ref 12–17)
IMM GRANULOCYTES # BLD AUTO: 0.01 THOUSAND/UL (ref 0–0.2)
IMM GRANULOCYTES NFR BLD AUTO: 0 % (ref 0–2)
LDLC SERPL CALC-MCNC: 108 MG/DL (ref 0–100)
LYMPHOCYTES # BLD AUTO: 1.37 THOUSANDS/ΜL (ref 0.6–4.47)
LYMPHOCYTES NFR BLD AUTO: 30 % (ref 14–44)
MCH RBC QN AUTO: 30 PG (ref 26.8–34.3)
MCHC RBC AUTO-ENTMCNC: 33.6 G/DL (ref 31.4–37.4)
MCV RBC AUTO: 89 FL (ref 82–98)
MONOCYTES # BLD AUTO: 0.31 THOUSAND/ΜL (ref 0.17–1.22)
MONOCYTES NFR BLD AUTO: 7 % (ref 4–12)
NEUTROPHILS # BLD AUTO: 2.78 THOUSANDS/ΜL (ref 1.85–7.62)
NEUTS SEG NFR BLD AUTO: 59 % (ref 43–75)
NRBC BLD AUTO-RTO: 0 /100 WBCS
PLATELET # BLD AUTO: 234 THOUSANDS/UL (ref 149–390)
PMV BLD AUTO: 9.8 FL (ref 8.9–12.7)
POTASSIUM SERPL-SCNC: 4.4 MMOL/L (ref 3.5–5.3)
PROT SERPL-MCNC: 6.4 G/DL (ref 6.4–8.4)
RBC # BLD AUTO: 5.2 MILLION/UL (ref 3.88–5.62)
SODIUM SERPL-SCNC: 141 MMOL/L (ref 135–147)
TRIGL SERPL-MCNC: 91 MG/DL
TSH SERPL DL<=0.05 MIU/L-ACNC: 1.24 UIU/ML (ref 0.45–4.5)
WBC # BLD AUTO: 4.63 THOUSAND/UL (ref 4.31–10.16)

## 2022-07-11 PROCEDURE — 85025 COMPLETE CBC W/AUTO DIFF WBC: CPT

## 2022-07-11 PROCEDURE — 80053 COMPREHEN METABOLIC PANEL: CPT

## 2022-07-11 PROCEDURE — 80061 LIPID PANEL: CPT

## 2022-07-11 PROCEDURE — 36415 COLL VENOUS BLD VENIPUNCTURE: CPT

## 2022-07-11 PROCEDURE — 83036 HEMOGLOBIN GLYCOSYLATED A1C: CPT

## 2022-07-11 PROCEDURE — 84443 ASSAY THYROID STIM HORMONE: CPT

## 2022-07-20 ENCOUNTER — OFFICE VISIT (OUTPATIENT)
Dept: INTERNAL MEDICINE CLINIC | Facility: CLINIC | Age: 67
End: 2022-07-20
Payer: MEDICARE

## 2022-07-20 VITALS
HEIGHT: 66 IN | BODY MASS INDEX: 25.55 KG/M2 | DIASTOLIC BLOOD PRESSURE: 68 MMHG | OXYGEN SATURATION: 97 % | TEMPERATURE: 98.4 F | SYSTOLIC BLOOD PRESSURE: 106 MMHG | WEIGHT: 159 LBS | HEART RATE: 90 BPM

## 2022-07-20 DIAGNOSIS — R73.01 IMPAIRED FASTING BLOOD SUGAR: ICD-10-CM

## 2022-07-20 DIAGNOSIS — Z00.00 MEDICARE ANNUAL WELLNESS VISIT, SUBSEQUENT: ICD-10-CM

## 2022-07-20 DIAGNOSIS — E22.2 ADH DISORDER (HCC): ICD-10-CM

## 2022-07-20 DIAGNOSIS — M35.3 POLYMYALGIA RHEUMATICA SYNDROME (HCC): ICD-10-CM

## 2022-07-20 DIAGNOSIS — E78.2 MIXED HYPERLIPIDEMIA: Primary | ICD-10-CM

## 2022-07-20 DIAGNOSIS — F42.8 OTHER OBSESSIVE-COMPULSIVE DISORDERS: ICD-10-CM

## 2022-07-20 PROCEDURE — 99214 OFFICE O/P EST MOD 30 MIN: CPT | Performed by: INTERNAL MEDICINE

## 2022-07-20 PROCEDURE — G0439 PPPS, SUBSEQ VISIT: HCPCS | Performed by: INTERNAL MEDICINE

## 2022-07-20 RX ORDER — SIMVASTATIN 10 MG
10 TABLET ORAL
Qty: 90 TABLET | Refills: 1 | Status: SHIPPED | OUTPATIENT
Start: 2022-07-20

## 2022-07-20 NOTE — PROGRESS NOTES
Assessment and Plan:     Problem List Items Addressed This Visit        Endocrine    ADH disorder (HCC) (Chronic)    Impaired fasting blood sugar       Other    OCD (obsessive compulsive disorder) (Chronic)    Mixed hyperlipidemia - Primary    Relevant Medications    simvastatin (ZOCOR) 10 mg tablet    Polymyalgia rheumatica syndrome ([address] )    Medicare annual wellness visit, subsequent          Depression Screening and Follow-up Plan: Patient was screened for depression during today's encounter  They screened negative with a PHQ-2 score of 1  Preventive health issues were discussed with patient, and age appropriate screening tests were ordered as noted in patient's After Visit Summary  Personalized health advice and appropriate referrals for health education or preventive services given if needed, as noted in patient's After Visit Summary  History of Present Illness:     Patient presents for a Medicare Wellness Visit    Follow-up on blood test done on 07/11/2022 test discussed with him, also medical wellness exam     Patient Care Team:  Cheikh Araiza MD as PCP - General     Review of Systems:     Review of Systems   Constitutional: Negative for chills and fever  HENT: Negative for congestion, ear pain and sore throat  Eyes: Negative for pain  Respiratory: Negative for cough and shortness of breath  Cardiovascular: Negative for chest pain and leg swelling  Gastrointestinal: Negative for abdominal pain, nausea and vomiting  Endocrine: Negative for polyuria  Genitourinary: Negative for difficulty urinating, frequency and urgency  Musculoskeletal: Negative for arthralgias and back pain  Skin: Negative for rash  Neurological: Negative for weakness and headaches  Psychiatric/Behavioral: Negative for sleep disturbance  The patient is not nervous/anxious           Problem List:     Patient Active Problem List   Diagnosis    Left facial numbness    History of temporal arteritis    HTN (hypertension)    Mixed hyperlipidemia    Acute nonintractable headache    OCD (obsessive compulsive disorder)    Anxiety    ADH disorder (HCC)    Pain in left knee    Other chest pain    Polymyalgia rheumatica syndrome (HCC)    Impaired fasting blood sugar    Medicare annual wellness visit, subsequent      Past Medical and Surgical History:     Past Medical History:   Diagnosis Date    Anxiety     Depression     Hyperlipidemia     Kidney stone     Polymyalgia rheumatica syndrome ([address] )     Psychiatric disorder     anxiety     Past Surgical History:   Procedure Laterality Date    APPENDECTOMY      COLONOSCOPY      KIDNEY STONE SURGERY      TONSILLECTOMY      TRANSURETHRAL RESECTION OF PROSTATE      WISDOM TOOTH EXTRACTION        Family History:     Family History   Problem Relation Age of Onset    Diabetes Mother     Heart attack Father     Prostate cancer Father       Social History:     Social History     Socioeconomic History    Marital status: Single     Spouse name: None    Number of children: None    Years of education: None    Highest education level: None   Occupational History    None   Tobacco Use    Smoking status: Never Smoker    Smokeless tobacco: Never Used   Vaping Use    Vaping Use: Never used   Substance and Sexual Activity    Alcohol use: No    Drug use: No    Sexual activity: None   Other Topics Concern    None   Social History Narrative    None     Social Determinants of Health     Financial Resource Strain: Not on file   Food Insecurity: Not on file   Transportation Needs: Not on file   Physical Activity: Not on file   Stress: Not on file   Social Connections: Not on file   Intimate Partner Violence: Not on file   Housing Stability: Not on file      Medications and Allergies:     Current Outpatient Medications   Medication Sig Dispense Refill    amphetamine-dextroamphetamine (ADDERALL XR) 20 MG 24 hr capsule Take 20 mg by mouth every morning      Brexpiprazole (REXULTI) 1 MG tablet Take 0 5 mg by mouth in the morning and 0 5 mg in the evening   clonazePAM (KlonoPIN) 0 5 mg tablet Take 0 5 mg by mouth 2 (two) times a day as needed for seizures 1/2 in AM 1/2 in PM and another 1/2 if needed      folic acid (FOLVITE) 1 mg tablet Take 1 mg by mouth daily      methotrexate 2 5 MG tablet Take by mouth 5 tablets on Sundays       simvastatin (ZOCOR) 10 mg tablet Take 1 tablet (10 mg total) by mouth daily at bedtime 90 tablet 1    vilazodone (VIIBRYD) 40 mg tablet Take 20 mg by mouth 2 (two) times a day Pt taking a half a pill       No current facility-administered medications for this visit  Allergies   Allergen Reactions    Tamsulosin Anxiety      Immunizations:     Immunization History   Administered Date(s) Administered    COVID-19 PFIZER VACCINE 0 3 ML IM 02/17/2021, 03/09/2021, 11/27/2021      Health Maintenance:         Topic Date Due    Colorectal Cancer Screening  02/08/2027    Hepatitis C Screening  Completed         Topic Date Due    Pneumococcal Vaccine: 65+ Years (1 - PCV) Never done    COVID-19 Vaccine (4 - Booster for Pfizer series) 02/27/2022    Influenza Vaccine (1) 09/01/2022      Medicare Screening Tests and Risk Assessments:     Last Medicare Wellness visit information reviewed, patient interviewed and updates made to the record today  Health Risk Assessment:   Patient rates overall health as good  Patient feels that their physical health rating is same  Patient is satisfied with their life  Eyesight was rated as same  Hearing was rated as same  Patient feels that their emotional and mental health rating is slightly better  Patients states they are never, rarely angry  Patient states they are often unusually tired/fatigued  Pain experienced in the last 7 days has been none  Patient states that he has experienced no weight loss or gain in last 6 months       Depression Screening:   PHQ-2 Score: 1  PHQ-9 Score: 4      Fall Risk Screening: In the past year, patient has experienced: no history of falling in past year      Home Safety:  Patient does not have trouble with stairs inside or outside of their home  Patient has working smoke alarms and has working carbon monoxide detector  Home safety hazards include: none  Nutrition:   Current diet is Regular  Medications:   Patient is not currently taking any over-the-counter supplements  Patient is able to manage medications  Activities of Daily Living (ADLs)/Instrumental Activities of Daily Living (IADLs):   Walk and transfer into and out of bed and chair?: Yes  Dress and groom yourself?: Yes    Bathe or shower yourself?: Yes    Feed yourself? Yes  Do your laundry/housekeeping?: Yes  Manage your money, pay your bills and track your expenses?: Yes  Make your own meals?: Yes    Do your own shopping?: Yes    Previous Hospitalizations:   Any hospitalizations or ED visits within the last 12 months?: No      Advance Care Planning:   Living will: No    Durable POA for healthcare: No    Advanced directive: No    Five wishes given: Yes      PREVENTIVE SCREENINGS      Cardiovascular Screening:    General: Screening Not Indicated and History Lipid Disorder      Diabetes Screening:     General: Screening Current      Colorectal Cancer Screening:     General: Screening Current      Prostate Cancer Screening:    General: Screening Current      Abdominal Aortic Aneurysm (AAA) Screening:    Risk factors include: age between 73-67 yo        Lung Cancer Screening:     General: Screening Not Indicated      Hepatitis C Screening:    General: Screening Current    Screening, Brief Intervention, and Referral to Treatment (SBIRT)    Screening  Typical number of drinks in a day: 0  Typical number of drinks in a week: 0  Interpretation: Low risk drinking behavior      Single Item Drug Screening:  How often have you used an illegal drug (including marijuana) or a prescription medication for non-medical reasons in the past year? never    Single Item Drug Screen Score: 0  Interpretation: Negative screen for possible drug use disorder    No exam data present     Physical Exam:     /68 (BP Location: Right arm, Patient Position: Sitting, Cuff Size: Standard)   Pulse 90   Temp 98 4 °F (36 9 °C) (Temporal)   Ht 5' 6" (1 676 m)   Wt 72 1 kg (159 lb)   SpO2 97%   BMI 25 66 kg/m²     Physical Exam  Vitals and nursing note reviewed  Constitutional:       Appearance: He is well-developed  HENT:      Head: Normocephalic and atraumatic  Right Ear: Tympanic membrane, ear canal and external ear normal       Left Ear: Tympanic membrane, ear canal and external ear normal       Mouth/Throat:      Pharynx: Oropharynx is clear  Eyes:      Extraocular Movements: Extraocular movements intact  Conjunctiva/sclera: Conjunctivae normal    Cardiovascular:      Rate and Rhythm: Normal rate and regular rhythm  Heart sounds: Normal heart sounds  No murmur heard  Pulmonary:      Effort: Pulmonary effort is normal  No respiratory distress  Breath sounds: Normal breath sounds  Abdominal:      General: Abdomen is flat  Palpations: Abdomen is soft  Tenderness: There is no abdominal tenderness  Musculoskeletal:         General: Normal range of motion  Cervical back: Normal range of motion and neck supple  Skin:     General: Skin is warm and dry  Neurological:      General: No focal deficit present  Mental Status: He is alert and oriented to person, place, and time            Shellye Sicard, MD

## 2022-07-21 ENCOUNTER — TELEPHONE (OUTPATIENT)
Dept: INTERNAL MEDICINE CLINIC | Facility: CLINIC | Age: 67
End: 2022-07-21

## 2022-07-21 NOTE — TELEPHONE ENCOUNTER
Patient had first booster shot for COVID and he had two shingles vaccine about two months  Ago  Would like to know if he should get the COVID booster or wait for  the new COVID vaccine is available

## 2022-08-04 ENCOUNTER — APPOINTMENT (OUTPATIENT)
Dept: LAB | Facility: CLINIC | Age: 67
End: 2022-08-04
Payer: MEDICARE

## 2022-08-04 DIAGNOSIS — N40.0 BENIGN PROSTATIC HYPERPLASIA WITHOUT LOWER URINARY TRACT SYMPTOMS: ICD-10-CM

## 2022-08-04 LAB — PSA SERPL-MCNC: 2.5 NG/ML (ref 0–4)

## 2022-08-04 PROCEDURE — 84153 ASSAY OF PSA TOTAL: CPT

## 2022-08-10 ENCOUNTER — OFFICE VISIT (OUTPATIENT)
Dept: UROLOGY | Facility: CLINIC | Age: 67
End: 2022-08-10
Payer: MEDICARE

## 2022-08-10 VITALS
BODY MASS INDEX: 25.78 KG/M2 | DIASTOLIC BLOOD PRESSURE: 80 MMHG | WEIGHT: 160.4 LBS | HEIGHT: 66 IN | OXYGEN SATURATION: 95 % | SYSTOLIC BLOOD PRESSURE: 100 MMHG | HEART RATE: 89 BPM

## 2022-08-10 DIAGNOSIS — N13.8 BPH WITH OBSTRUCTION/LOWER URINARY TRACT SYMPTOMS: Primary | ICD-10-CM

## 2022-08-10 DIAGNOSIS — N40.1 BPH WITH OBSTRUCTION/LOWER URINARY TRACT SYMPTOMS: Primary | ICD-10-CM

## 2022-08-10 PROCEDURE — 99213 OFFICE O/P EST LOW 20 MIN: CPT | Performed by: PHYSICIAN ASSISTANT

## 2022-08-10 NOTE — PROGRESS NOTES
UROLOGY PROGRESS NOTE   Patient Identifiers: Orlando Garrett (MRN [id number])  Date of Service: 8/10/2022    Subjective:     48-year-old man history of BPH  He had a TURP in 2014  In May his urine showed microscopic blood and leukocytes  He complained of some burning  I put him on Levaquin and tamsulosin  Here for his annual exam   PSA 2 5     Reason for visit:  BPH follow-up      Objective:     VITALS:    There were no vitals filed for this visit  LABS:  Lab Results   Component Value Date    HGB 15 6 07/11/2022    HCT 46 4 07/11/2022    WBC 4 63 07/11/2022     07/11/2022   ]    Lab Results   Component Value Date     11/21/2015    K 4 4 07/11/2022     07/11/2022    CO2 31 07/11/2022    BUN 17 07/11/2022    CREATININE 0 98 07/11/2022    CALCIUM 9 5 07/11/2022    GLUCOSE 92 11/21/2015   ]        INPATIENT MEDS:    Current Outpatient Medications:     amphetamine-dextroamphetamine (ADDERALL XR) 20 MG 24 hr capsule, Take 20 mg by mouth every morning, Disp: , Rfl:     Brexpiprazole (REXULTI) 1 MG tablet, Take 0 5 mg by mouth in the morning and 0 5 mg in the evening , Disp: , Rfl:     clonazePAM (KlonoPIN) 0 5 mg tablet, Take 0 5 mg by mouth 2 (two) times a day as needed for seizures 1/2 in AM 1/2 in PM and another 1/2 if needed, Disp: , Rfl:     folic acid (FOLVITE) 1 mg tablet, Take 1 mg by mouth daily, Disp: , Rfl:     methotrexate 2 5 MG tablet, Take by mouth 5 tablets on Sundays , Disp: , Rfl:     simvastatin (ZOCOR) 10 mg tablet, Take 1 tablet (10 mg total) by mouth daily at bedtime, Disp: 90 tablet, Rfl: 1    vilazodone (VIIBRYD) 40 mg tablet, Take 20 mg by mouth 2 (two) times a day Pt taking a half a pill, Disp: , Rfl:       Physical Exam:   There were no vitals taken for this visit    GEN: no acute distress    RESP: breathing comfortably with no accessory muscle use    ABD: soft, non-tender, non-distended   INCISION:    EXT: no significant peripheral edema   (Male): Penis circumcised, phallus normal, meatus patent  Testicles descended into scrotum bilaterally without masses nor tenderness  No inguinal hernias bilaterally  VIVIAN: Prostate is enlarged at 35 grams  The prostate is not boggy  The prostate is not tender  No nodules noted    RADIOLOGY:   None     Assessment:   1   BPH     Plan:   -follow-up in 1 year with PSA prior to visit  -  -  -

## 2022-08-12 ENCOUNTER — TELEPHONE (OUTPATIENT)
Dept: INTERNAL MEDICINE CLINIC | Facility: CLINIC | Age: 67
End: 2022-08-12

## 2022-08-12 ENCOUNTER — NURSE TRIAGE (OUTPATIENT)
Dept: OTHER | Facility: OTHER | Age: 67
End: 2022-08-12

## 2022-08-12 NOTE — TELEPHONE ENCOUNTER
patient called this morning about not feeling well and wanted  Call back , called back and left a voice message

## 2022-08-13 NOTE — TELEPHONE ENCOUNTER
Patient started with cough last night  Tested positive for covid today  No fever or other symptoms at this time  Denies any CP, wheezing or SOB  Care advice given

## 2022-08-13 NOTE — TELEPHONE ENCOUNTER
Reason for Disposition   [1] COVID-19 diagnosed by positive lab test (e g , PCR, rapid self-test kit) AND [2] mild symptoms (e g , cough, fever, others) AND [0] no complications or SOB    Answer Assessment - Initial Assessment Questions  Were you within 6 feet or less, for up to 15 minutes or more with a person that has a confirmed COVID-19 test? unsure  What was the date of your exposure? unsure  Are you experiencing any symptoms attributed to the virus?  (Assess for SOB, cough, fever, difficulty breathing) mild cough,   HIGH RISK: Do you have any history heart or lung conditions, weakened immune system, diabetes, Asthma, CHF, HIV, COPD, Chemo, renal failure, sickle cell, etc? denies    Protocols used: CORONAVIRUS (COVID-19) DIAGNOSED OR SUSPECTED-[facility]

## 2022-08-13 NOTE — TELEPHONE ENCOUNTER
Regarding: Tested positive for covid lookig for medical advice   ----- Message from Krishnamarlon Todd sent at 8/12/2022  6:56 PM EDT -----  '' I tested positive for covid I have no fever but looking for medical advice on what to do ''

## 2022-09-13 ENCOUNTER — APPOINTMENT (OUTPATIENT)
Dept: LAB | Facility: CLINIC | Age: 67
End: 2022-09-13
Payer: MEDICARE

## 2022-09-13 DIAGNOSIS — M35.3 POLYMYALGIA RHEUMATICA (HCC): ICD-10-CM

## 2022-09-13 LAB
ALBUMIN SERPL BCP-MCNC: 4.3 G/DL (ref 3.5–5)
ALP SERPL-CCNC: 80 U/L (ref 34–104)
ALT SERPL W P-5'-P-CCNC: 19 U/L (ref 7–52)
ANION GAP SERPL CALCULATED.3IONS-SCNC: 5 MMOL/L (ref 4–13)
AST SERPL W P-5'-P-CCNC: 19 U/L (ref 13–39)
BASOPHILS # BLD AUTO: 0.03 THOUSANDS/ΜL (ref 0–0.1)
BASOPHILS NFR BLD AUTO: 1 % (ref 0–1)
BILIRUB SERPL-MCNC: 0.66 MG/DL (ref 0.2–1)
BUN SERPL-MCNC: 19 MG/DL (ref 5–25)
CALCIUM SERPL-MCNC: 9.6 MG/DL (ref 8.4–10.2)
CHLORIDE SERPL-SCNC: 107 MMOL/L (ref 96–108)
CO2 SERPL-SCNC: 29 MMOL/L (ref 21–32)
CREAT SERPL-MCNC: 0.98 MG/DL (ref 0.6–1.3)
CRP SERPL QL: <1 MG/L
EOSINOPHIL # BLD AUTO: 0.13 THOUSAND/ΜL (ref 0–0.61)
EOSINOPHIL NFR BLD AUTO: 3 % (ref 0–6)
ERYTHROCYTE [DISTWIDTH] IN BLOOD BY AUTOMATED COUNT: 13.5 % (ref 11.6–15.1)
ERYTHROCYTE [SEDIMENTATION RATE] IN BLOOD: 1 MM/HOUR (ref 0–19)
GFR SERPL CREATININE-BSD FRML MDRD: 79 ML/MIN/1.73SQ M
GLUCOSE SERPL-MCNC: 106 MG/DL (ref 65–140)
HCT VFR BLD AUTO: 42.8 % (ref 36.5–49.3)
HGB BLD-MCNC: 14.7 G/DL (ref 12–17)
IMM GRANULOCYTES # BLD AUTO: 0.01 THOUSAND/UL (ref 0–0.2)
IMM GRANULOCYTES NFR BLD AUTO: 0 % (ref 0–2)
LYMPHOCYTES # BLD AUTO: 1.75 THOUSANDS/ΜL (ref 0.6–4.47)
LYMPHOCYTES NFR BLD AUTO: 35 % (ref 14–44)
MCH RBC QN AUTO: 30.2 PG (ref 26.8–34.3)
MCHC RBC AUTO-ENTMCNC: 34.3 G/DL (ref 31.4–37.4)
MCV RBC AUTO: 88 FL (ref 82–98)
MONOCYTES # BLD AUTO: 0.44 THOUSAND/ΜL (ref 0.17–1.22)
MONOCYTES NFR BLD AUTO: 9 % (ref 4–12)
NEUTROPHILS # BLD AUTO: 2.65 THOUSANDS/ΜL (ref 1.85–7.62)
NEUTS SEG NFR BLD AUTO: 52 % (ref 43–75)
NRBC BLD AUTO-RTO: 0 /100 WBCS
PLATELET # BLD AUTO: 218 THOUSANDS/UL (ref 149–390)
PMV BLD AUTO: 9.7 FL (ref 8.9–12.7)
POTASSIUM SERPL-SCNC: 4.1 MMOL/L (ref 3.5–5.3)
PROT SERPL-MCNC: 6.5 G/DL (ref 6.4–8.4)
RBC # BLD AUTO: 4.86 MILLION/UL (ref 3.88–5.62)
SODIUM SERPL-SCNC: 141 MMOL/L (ref 135–147)
WBC # BLD AUTO: 5.01 THOUSAND/UL (ref 4.31–10.16)

## 2022-09-13 PROCEDURE — 85652 RBC SED RATE AUTOMATED: CPT

## 2022-09-13 PROCEDURE — 36415 COLL VENOUS BLD VENIPUNCTURE: CPT

## 2022-09-13 PROCEDURE — 86140 C-REACTIVE PROTEIN: CPT

## 2022-09-13 PROCEDURE — 85025 COMPLETE CBC W/AUTO DIFF WBC: CPT

## 2022-09-13 PROCEDURE — 80053 COMPREHEN METABOLIC PANEL: CPT

## 2022-10-11 PROBLEM — Z00.00 MEDICARE ANNUAL WELLNESS VISIT, SUBSEQUENT: Status: RESOLVED | Noted: 2022-07-20 | Resolved: 2022-10-11

## 2022-10-20 ENCOUNTER — TELEPHONE (OUTPATIENT)
Dept: INTERNAL MEDICINE CLINIC | Facility: CLINIC | Age: 67
End: 2022-10-20

## 2022-10-20 NOTE — TELEPHONE ENCOUNTER
Patient called asking for your recommendation if this is the proper time for him to get his 3rd Covid booster vaccine? Please advise       CB# [phone]

## 2022-10-20 NOTE — TELEPHONE ENCOUNTER
Patient called back with a VM that said he found the information he needed to disregard his previous message

## 2022-11-09 ENCOUNTER — RA CDI HCC (OUTPATIENT)
Dept: OTHER | Facility: HOSPITAL | Age: 67
End: 2022-11-09

## 2022-11-09 NOTE — PROGRESS NOTES
[address]  coding opportunities       Chart reviewed, no opportunity found: CHART REVIEWED, NO OPPORTUNITY FOUND        Patients Insurance     Medicare Insurance: Medicare

## 2022-11-16 ENCOUNTER — OFFICE VISIT (OUTPATIENT)
Dept: INTERNAL MEDICINE CLINIC | Facility: CLINIC | Age: 67
End: 2022-11-16

## 2022-11-16 VITALS
DIASTOLIC BLOOD PRESSURE: 74 MMHG | OXYGEN SATURATION: 100 % | WEIGHT: 156.6 LBS | BODY MASS INDEX: 25.17 KG/M2 | HEIGHT: 66 IN | SYSTOLIC BLOOD PRESSURE: 116 MMHG | TEMPERATURE: 98.7 F | HEART RATE: 86 BPM

## 2022-11-16 DIAGNOSIS — E78.2 MIXED HYPERLIPIDEMIA: Primary | ICD-10-CM

## 2022-11-16 DIAGNOSIS — R73.01 IMPAIRED FASTING BLOOD SUGAR: ICD-10-CM

## 2022-11-16 DIAGNOSIS — E22.2 ADH DISORDER (HCC): ICD-10-CM

## 2022-11-16 DIAGNOSIS — F42.8 OTHER OBSESSIVE-COMPULSIVE DISORDERS: ICD-10-CM

## 2022-11-16 DIAGNOSIS — M35.3 POLYMYALGIA RHEUMATICA SYNDROME (HCC): ICD-10-CM

## 2022-11-16 RX ORDER — DEXTROAMPHETAMINE SACCHARATE, AMPHETAMINE ASPARTATE, DEXTROAMPHETAMINE SULFATE AND AMPHETAMINE SULFATE 2.5; 2.5; 2.5; 2.5 MG/1; MG/1; MG/1; MG/1
10 TABLET ORAL
COMMUNITY

## 2022-11-16 RX ORDER — SIMVASTATIN 10 MG
10 TABLET ORAL
Qty: 90 TABLET | Refills: 1 | Status: SHIPPED | OUTPATIENT
Start: 2022-11-16

## 2022-11-16 NOTE — PROGRESS NOTES
Assessment/Plan:             1  Mixed hyperlipidemia  -     simvastatin (ZOCOR) 10 mg tablet; Take 1 tablet (10 mg total) by mouth daily at bedtime  -     Comprehensive metabolic panel; Future  -     Lipid Panel with Direct LDL reflex; Future  -     TSH, 3rd generation; Future    2  ADH disorder ([facility] [address] )    3  Impaired fasting blood sugar  -     CBC and differential; Future  -     Hemoglobin A1C; Future    4  Polymyalgia rheumatica syndrome (HCC)    5  Other obsessive-compulsive disorders         Subjective:      Patient ID: Regan Pereira is a 78 y o  male  follow-up on multiple medical problems to ensure the stable on current medications      The following portions of the patient's history were reviewed and updated as appropriate: He  has a past medical history of Anxiety, Depression, Hyperlipidemia, Kidney stone, Polymyalgia rheumatica syndrome ([facility] [address] ), and Psychiatric disorder  He   Patient Active Problem List    Diagnosis Date Noted   • Polymyalgia rheumatica syndrome ([facility] [address] ) 05/18/2022   • Impaired fasting blood sugar 05/18/2022   • Other chest pain 11/17/2020   • Pain in left knee 05/23/2018   • Left facial numbness 12/18/2016   • History of temporal arteritis 12/18/2016   • HTN (hypertension) 12/18/2016   • Mixed hyperlipidemia 12/18/2016   • Acute nonintractable headache 12/18/2016   • Obsessive compulsive disorder 12/18/2016   • Anxiety 12/18/2016   • ADH disorder ([facility] [address] ) 12/18/2016     He  has a past surgical history that includes Appendectomy; Tonsillectomy; Kidney stone surgery; Transurethral resection of prostate; [place] tooth extraction; and Colonoscopy  His family history includes Diabetes in his mother; Heart attack in his father; Prostate cancer in his father  He  reports that he has never smoked  He has never used smokeless tobacco  He reports that he does not drink alcohol and does not use drugs    Current Outpatient Medications   Medication Sig Dispense Refill   • amphetamine-dextroamphetamine (ADDERALL) 10 mg tablet Take 10 mg by mouth 2 (two) times a day     • Brexpiprazole (REXULTI) 1 MG tablet Take 0 5 mg by mouth in the morning and 0 5 mg in the evening  • clonazePAM (KlonoPIN) 0 5 mg tablet Take 0 5 mg by mouth 2 (two) times a day as needed for seizures 1/2 in AM 1/2 in PM and another 1/2 if needed     • folic acid (FOLVITE) 1 mg tablet Take 1 mg by mouth daily     • methotrexate 2 5 MG tablet Take by mouth 5 tablets on Sundays      • simvastatin (ZOCOR) 10 mg tablet Take 1 tablet (10 mg total) by mouth daily at bedtime 90 tablet 1   • vilazodone (VIIBRYD) 40 mg tablet Take 20 mg by mouth 2 (two) times a day Pt taking a half a pill       No current facility-administered medications for this visit  Current Outpatient Medications on File Prior to Visit   Medication Sig   • amphetamine-dextroamphetamine (ADDERALL) 10 mg tablet Take 10 mg by mouth 2 (two) times a day   • Brexpiprazole (REXULTI) 1 MG tablet Take 0 5 mg by mouth in the morning and 0 5 mg in the evening  • clonazePAM (KlonoPIN) 0 5 mg tablet Take 0 5 mg by mouth 2 (two) times a day as needed for seizures 1/2 in AM 1/2 in PM and another 1/2 if needed   • folic acid (FOLVITE) 1 mg tablet Take 1 mg by mouth daily   • methotrexate 2 5 MG tablet Take by mouth 5 tablets on Sundays    • vilazodone (VIIBRYD) 40 mg tablet Take 20 mg by mouth 2 (two) times a day Pt taking a half a pill   • [DISCONTINUED] simvastatin (ZOCOR) 10 mg tablet Take 1 tablet (10 mg total) by mouth daily at bedtime   • clonazePAM (KlonoPIN) 0 5 mg tablet Take 0 5 mg by mouth 2 (two) times a day as needed for seizures 1/2 in AM 1/2 in PM and another 1/2 if needed   • [DISCONTINUED] amphetamine-dextroamphetamine (ADDERALL XR) 20 MG 24 hr capsule Take 20 mg by mouth every morning (Patient not taking: Reported on 11/16/2022)     No current facility-administered medications on file prior to visit       He is allergic to tamsulosin       Review of Systems   Constitutional: Negative for chills and fever  HENT: Negative for congestion, ear pain and sore throat  Eyes: Negative for pain  Respiratory: Negative for cough and shortness of breath  Cardiovascular: Negative for chest pain and leg swelling  Gastrointestinal: Negative for abdominal pain, nausea and vomiting  Endocrine: Negative for polyuria  Genitourinary: Negative for difficulty urinating, frequency and urgency  Musculoskeletal: Negative for arthralgias and back pain  Skin: Negative for rash  Neurological: Negative for weakness and headaches  Psychiatric/Behavioral: Negative for sleep disturbance  The patient is not nervous/anxious  Objective:      /74 (BP Location: Left arm, Patient Position: Sitting, Cuff Size: Standard)   Pulse 86   Temp 98 7 °F (37 1 °C)   Ht 5' 6" (1 676 m)   Wt 71 kg (156 lb 9 6 oz)   SpO2 100%   BMI 25 28 kg/m²     No results found for this or any previous visit (from the past 1344 hour(s))  Physical Exam  Constitutional:       Appearance: Normal appearance  HENT:      Head: Normocephalic  Right Ear: Tympanic membrane, ear canal and external ear normal       Left Ear: Tympanic membrane, ear canal and external ear normal       Nose: Nose normal  No congestion  Mouth/Throat:      Mouth: Mucous membranes are moist       Pharynx: Oropharynx is clear  No oropharyngeal exudate or posterior oropharyngeal erythema  Eyes:      Extraocular Movements: Extraocular movements intact  Conjunctiva/sclera: Conjunctivae normal       Pupils: Pupils are equal, round, and reactive to light  Cardiovascular:      Rate and Rhythm: Normal rate and regular rhythm  Heart sounds: Normal heart sounds  No murmur heard  Pulmonary:      Effort: Pulmonary effort is normal       Breath sounds: Normal breath sounds  No wheezing or rales  Abdominal:      General: Bowel sounds are normal  There is no distension  Palpations: Abdomen is soft  Tenderness: There is no abdominal tenderness  Musculoskeletal:         General: Normal range of motion  Cervical back: Normal range of motion and neck supple  Right lower leg: No edema  Left lower leg: No edema  Lymphadenopathy:      Cervical: No cervical adenopathy  Skin:     General: Skin is warm  Neurological:      General: No focal deficit present  Mental Status: He is alert and oriented to person, place, and time

## 2023-01-17 ENCOUNTER — APPOINTMENT (OUTPATIENT)
Dept: LAB | Facility: CLINIC | Age: 68
End: 2023-01-17

## 2023-01-17 ENCOUNTER — RA CDI HCC (OUTPATIENT)
Dept: OTHER | Facility: HOSPITAL | Age: 68
End: 2023-01-17

## 2023-01-17 DIAGNOSIS — E78.2 MIXED HYPERLIPIDEMIA: ICD-10-CM

## 2023-01-17 DIAGNOSIS — R73.01 IMPAIRED FASTING BLOOD SUGAR: ICD-10-CM

## 2023-01-17 LAB
ALBUMIN SERPL BCP-MCNC: 4.3 G/DL (ref 3.5–5)
ALP SERPL-CCNC: 86 U/L (ref 34–104)
ALT SERPL W P-5'-P-CCNC: 24 U/L (ref 7–52)
ANION GAP SERPL CALCULATED.3IONS-SCNC: 6 MMOL/L (ref 4–13)
AST SERPL W P-5'-P-CCNC: 22 U/L (ref 13–39)
BASOPHILS # BLD AUTO: 0.04 THOUSANDS/ÂΜL (ref 0–0.1)
BASOPHILS NFR BLD AUTO: 1 % (ref 0–1)
BILIRUB SERPL-MCNC: 0.76 MG/DL (ref 0.2–1)
BUN SERPL-MCNC: 20 MG/DL (ref 5–25)
CALCIUM SERPL-MCNC: 9.5 MG/DL (ref 8.4–10.2)
CHLORIDE SERPL-SCNC: 103 MMOL/L (ref 96–108)
CHOLEST SERPL-MCNC: 169 MG/DL
CO2 SERPL-SCNC: 31 MMOL/L (ref 21–32)
CREAT SERPL-MCNC: 1.18 MG/DL (ref 0.6–1.3)
EOSINOPHIL # BLD AUTO: 0.13 THOUSAND/ÂΜL (ref 0–0.61)
EOSINOPHIL NFR BLD AUTO: 3 % (ref 0–6)
ERYTHROCYTE [DISTWIDTH] IN BLOOD BY AUTOMATED COUNT: 12.8 % (ref 11.6–15.1)
EST. AVERAGE GLUCOSE BLD GHB EST-MCNC: 128 MG/DL
GFR SERPL CREATININE-BSD FRML MDRD: 63 ML/MIN/1.73SQ M
GLUCOSE P FAST SERPL-MCNC: 124 MG/DL (ref 65–99)
HBA1C MFR BLD: 6.1 %
HCT VFR BLD AUTO: 46.9 % (ref 36.5–49.3)
HDLC SERPL-MCNC: 47 MG/DL
HGB BLD-MCNC: 15.6 G/DL (ref 12–17)
IMM GRANULOCYTES # BLD AUTO: 0.02 THOUSAND/UL (ref 0–0.2)
IMM GRANULOCYTES NFR BLD AUTO: 0 % (ref 0–2)
LDLC SERPL CALC-MCNC: 105 MG/DL (ref 0–100)
LYMPHOCYTES # BLD AUTO: 1.75 THOUSANDS/ÂΜL (ref 0.6–4.47)
LYMPHOCYTES NFR BLD AUTO: 34 % (ref 14–44)
MCH RBC QN AUTO: 29.8 PG (ref 26.8–34.3)
MCHC RBC AUTO-ENTMCNC: 33.3 G/DL (ref 31.4–37.4)
MCV RBC AUTO: 90 FL (ref 82–98)
MONOCYTES # BLD AUTO: 0.45 THOUSAND/ÂΜL (ref 0.17–1.22)
MONOCYTES NFR BLD AUTO: 9 % (ref 4–12)
NEUTROPHILS # BLD AUTO: 2.74 THOUSANDS/ÂΜL (ref 1.85–7.62)
NEUTS SEG NFR BLD AUTO: 53 % (ref 43–75)
NRBC BLD AUTO-RTO: 0 /100 WBCS
PLATELET # BLD AUTO: 232 THOUSANDS/UL (ref 149–390)
PMV BLD AUTO: 9.7 FL (ref 8.9–12.7)
POTASSIUM SERPL-SCNC: 4 MMOL/L (ref 3.5–5.3)
PROT SERPL-MCNC: 6.7 G/DL (ref 6.4–8.4)
RBC # BLD AUTO: 5.23 MILLION/UL (ref 3.88–5.62)
SODIUM SERPL-SCNC: 140 MMOL/L (ref 135–147)
TRIGL SERPL-MCNC: 84 MG/DL
TSH SERPL DL<=0.05 MIU/L-ACNC: 1.55 UIU/ML (ref 0.45–4.5)
WBC # BLD AUTO: 5.13 THOUSAND/UL (ref 4.31–10.16)

## 2023-01-24 ENCOUNTER — OFFICE VISIT (OUTPATIENT)
Dept: INTERNAL MEDICINE CLINIC | Facility: CLINIC | Age: 68
End: 2023-01-24

## 2023-01-24 VITALS
HEART RATE: 90 BPM | DIASTOLIC BLOOD PRESSURE: 72 MMHG | WEIGHT: 164 LBS | HEIGHT: 66 IN | OXYGEN SATURATION: 97 % | BODY MASS INDEX: 26.36 KG/M2 | SYSTOLIC BLOOD PRESSURE: 110 MMHG | TEMPERATURE: 97.8 F

## 2023-01-24 DIAGNOSIS — E22.2 ADH DISORDER (HCC): ICD-10-CM

## 2023-01-24 DIAGNOSIS — E78.2 MIXED HYPERLIPIDEMIA: ICD-10-CM

## 2023-01-24 DIAGNOSIS — M35.3 POLYMYALGIA RHEUMATICA SYNDROME (HCC): ICD-10-CM

## 2023-01-24 DIAGNOSIS — R73.01 IMPAIRED FASTING BLOOD SUGAR: Primary | ICD-10-CM

## 2023-01-24 DIAGNOSIS — F41.9 ANXIETY: ICD-10-CM

## 2023-01-24 DIAGNOSIS — F42.8 OTHER OBSESSIVE-COMPULSIVE DISORDERS: ICD-10-CM

## 2023-01-24 RX ORDER — DEXTROAMPHETAMINE SACCHARATE, AMPHETAMINE ASPARTATE MONOHYDRATE, DEXTROAMPHETAMINE SULFATE AND AMPHETAMINE SULFATE 2.5; 2.5; 2.5; 2.5 MG/1; MG/1; MG/1; MG/1
10 CAPSULE, EXTENDED RELEASE ORAL EVERY MORNING
COMMUNITY
Start: 2022-12-16

## 2023-01-24 NOTE — PROGRESS NOTES
Assessment/Plan:    BMI Counseling: Body mass index is 26 47 kg/m²  The BMI is above normal  Nutrition recommendations include decreasing portion sizes, encouraging healthy choices of fruits and vegetables and decreasing fast food intake  Exercise recommendations include moderate physical activity 150 minutes/week  Rationale for BMI follow-up plan is due to patient being overweight or obese  1  Impaired fasting blood sugar    2  Mixed hyperlipidemia    3  ADH disorder ([facility] [address] )    4  Polymyalgia rheumatica syndrome (HCC)    5  Other obsessive-compulsive disorders    6  Anxiety         Subjective:      Patient ID: Sameera Jang is a 75 y o  male  Follow-up on blood test done on 1/17/2023 test discussed with him      The following portions of the patient's history were reviewed and updated as appropriate: He  has a past medical history of Anxiety, Depression, Hyperlipidemia, Kidney stone, Polymyalgia rheumatica syndrome ([facility] [address] ), and Psychiatric disorder  He   Patient Active Problem List    Diagnosis Date Noted   • Polymyalgia rheumatica syndrome ([facility] [address] ) 05/18/2022   • Impaired fasting blood sugar 05/18/2022   • Other chest pain 11/17/2020   • Pain in left knee 05/23/2018   • Left facial numbness 12/18/2016   • History of temporal arteritis 12/18/2016   • HTN (hypertension) 12/18/2016   • Mixed hyperlipidemia 12/18/2016   • Acute nonintractable headache 12/18/2016   • Obsessive compulsive disorder 12/18/2016   • Anxiety 12/18/2016   • ADH disorder ([facility] [address] ) 12/18/2016     He  has a past surgical history that includes Appendectomy; Tonsillectomy; Kidney stone surgery; Transurethral resection of prostate; [place] tooth extraction; and Colonoscopy  His family history includes Diabetes in his mother; Heart attack in his father; Prostate cancer in his father  He  reports that he has never smoked  He has never used smokeless tobacco  He reports that he does not drink alcohol and does not use drugs    Current Outpatient Medications   Medication Sig Dispense Refill   • amphetamine-dextroamphetamine (ADDERALL XR) 10 MG 24 hr capsule Take 10 mg by mouth every morning     • Brexpiprazole (REXULTI) 1 MG tablet Take 0 5 mg by mouth in the morning and 0 5 mg in the evening  • clonazePAM (KlonoPIN) 0 5 mg tablet Take 0 5 mg by mouth 2 (two) times a day as needed for seizures 1/2 in AM 1/2 in PM and another 1/2 if needed     • folic acid (FOLVITE) 1 mg tablet Take 1 mg by mouth daily     • methotrexate 2 5 MG tablet Take by mouth 5 tablets on Sundays      • simvastatin (ZOCOR) 10 mg tablet Take 1 tablet (10 mg total) by mouth daily at bedtime 90 tablet 1   • vilazodone (VIIBRYD) 40 mg tablet Take 20 mg by mouth 2 (two) times a day Pt taking a half a pill       No current facility-administered medications for this visit  Current Outpatient Medications on File Prior to Visit   Medication Sig   • amphetamine-dextroamphetamine (ADDERALL XR) 10 MG 24 hr capsule Take 10 mg by mouth every morning   • Brexpiprazole (REXULTI) 1 MG tablet Take 0 5 mg by mouth in the morning and 0 5 mg in the evening  • clonazePAM (KlonoPIN) 0 5 mg tablet Take 0 5 mg by mouth 2 (two) times a day as needed for seizures 1/2 in AM 1/2 in PM and another 1/2 if needed   • folic acid (FOLVITE) 1 mg tablet Take 1 mg by mouth daily   • methotrexate 2 5 MG tablet Take by mouth 5 tablets on Sundays    • simvastatin (ZOCOR) 10 mg tablet Take 1 tablet (10 mg total) by mouth daily at bedtime   • vilazodone (VIIBRYD) 40 mg tablet Take 20 mg by mouth 2 (two) times a day Pt taking a half a pill   • [DISCONTINUED] amphetamine-dextroamphetamine (ADDERALL) 10 mg tablet Take 10 mg by mouth 2 (two) times a day     No current facility-administered medications on file prior to visit  He is allergic to tamsulosin       Review of Systems   Constitutional: Negative for chills and fever  HENT: Negative for congestion, ear pain and sore throat      Eyes: Negative for pain    Respiratory: Negative for cough and shortness of breath  Cardiovascular: Negative for chest pain and leg swelling  Gastrointestinal: Negative for abdominal pain, nausea and vomiting  Endocrine: Negative for polyuria  Genitourinary: Negative for difficulty urinating, frequency and urgency  Musculoskeletal: Positive for arthralgias  Negative for back pain  Skin: Negative for rash  Neurological: Negative for weakness and headaches  Psychiatric/Behavioral: Negative for sleep disturbance  The patient is not nervous/anxious            Objective:      /72 (BP Location: Left arm, Patient Position: Sitting, Cuff Size: Standard)   Pulse 90   Temp 97 8 °F (36 6 °C) (Temporal)   Ht 5' 6" (1 676 m)   Wt 74 4 kg (164 lb)   SpO2 97%   BMI 26 47 kg/m²     Recent Results (from the past 1344 hour(s))   CBC and differential    Collection Time: 01/17/23  8:03 AM   Result Value Ref Range    WBC 5 13 4 31 - 10 16 Thousand/uL    RBC 5 23 3 88 - 5 62 Million/uL    Hemoglobin 15 6 12 0 - 17 0 g/dL    Hematocrit 46 9 36 5 - 49 3 %    MCV 90 82 - 98 fL    MCH 29 8 26 8 - 34 3 pg    MCHC 33 3 31 4 - 37 4 g/dL    RDW 12 8 11 6 - 15 1 %    MPV 9 7 8 9 - 12 7 fL    Platelets 460 209 - 640 Thousands/uL    nRBC 0 /100 WBCs    Neutrophils Relative 53 43 - 75 %    Immat GRANS % 0 0 - 2 %    Lymphocytes Relative 34 14 - 44 %    Monocytes Relative 9 4 - 12 %    Eosinophils Relative 3 0 - 6 %    Basophils Relative 1 0 - 1 %    Neutrophils Absolute 2 74 1 85 - 7 62 Thousands/µL    Immature Grans Absolute 0 02 0 00 - 0 20 Thousand/uL    Lymphocytes Absolute 1 75 0 60 - 4 47 Thousands/µL    Monocytes Absolute 0 45 0 17 - 1 22 Thousand/µL    Eosinophils Absolute 0 13 0 00 - 0 61 Thousand/µL    Basophils Absolute 0 04 0 00 - 0 10 Thousands/µL   Comprehensive metabolic panel    Collection Time: 01/17/23  8:03 AM   Result Value Ref Range    Sodium 140 135 - 147 mmol/L    Potassium 4 0 3 5 - 5 3 mmol/L    Chloride 103 96 - 108 mmol/L    CO2 31 21 - 32 mmol/L    ANION GAP 6 4 - 13 mmol/L    BUN 20 5 - 25 mg/dL    Creatinine 1 18 0 60 - 1 30 mg/dL    Glucose, Fasting 124 (H) 65 - 99 mg/dL    Calcium 9 5 8 4 - 10 2 mg/dL    AST 22 13 - 39 U/L    ALT 24 7 - 52 U/L    Alkaline Phosphatase 86 34 - 104 U/L    Total Protein 6 7 6 4 - 8 4 g/dL    Albumin 4 3 3 5 - 5 0 g/dL    Total Bilirubin 0 76 0 20 - 1 00 mg/dL    eGFR 63 ml/min/1 73sq m   Hemoglobin A1C    Collection Time: 01/17/23  8:03 AM   Result Value Ref Range    Hemoglobin A1C 6 1 (H) Normal 3 8-5 6%; PreDiabetic 5 7-6 4%; Diabetic >=6 5%; Glycemic control for adults with diabetes <7 0% %     mg/dl   Lipid Panel with Direct LDL reflex    Collection Time: 01/17/23  8:03 AM   Result Value Ref Range    Cholesterol 169 See Comment mg/dL    Triglycerides 84 See Comment mg/dL    HDL, Direct 47 >=40 mg/dL    LDL Calculated 105 (H) 0 - 100 mg/dL   TSH, 3rd generation    Collection Time: 01/17/23  8:03 AM   Result Value Ref Range    TSH 3RD GENERATON 1 552 0 450 - 4 500 uIU/mL        Physical Exam  Constitutional:       Appearance: Normal appearance  HENT:      Head: Normocephalic  Right Ear: Tympanic membrane, ear canal and external ear normal       Left Ear: Tympanic membrane, ear canal and external ear normal       Nose: Nose normal  No congestion  Mouth/Throat:      Mouth: Mucous membranes are moist       Pharynx: Oropharynx is clear  No oropharyngeal exudate or posterior oropharyngeal erythema  Eyes:      Extraocular Movements: Extraocular movements intact  Conjunctiva/sclera: Conjunctivae normal       Pupils: Pupils are equal, round, and reactive to light  Cardiovascular:      Rate and Rhythm: Normal rate and regular rhythm  Heart sounds: Normal heart sounds  No murmur heard  Pulmonary:      Effort: Pulmonary effort is normal       Breath sounds: Normal breath sounds  No wheezing or rales     Abdominal:      General: Bowel sounds are normal  There is no distension  Palpations: Abdomen is soft  Tenderness: There is no abdominal tenderness  Musculoskeletal:         General: Normal range of motion  Cervical back: Normal range of motion and neck supple  Right lower leg: No edema  Left lower leg: No edema  Lymphadenopathy:      Cervical: No cervical adenopathy  Skin:     General: Skin is warm  Neurological:      General: No focal deficit present  Mental Status: He is alert and oriented to person, place, and time

## 2023-05-15 ENCOUNTER — RA CDI HCC (OUTPATIENT)
Dept: OTHER | Facility: HOSPITAL | Age: 68
End: 2023-05-15

## 2023-05-23 ENCOUNTER — OFFICE VISIT (OUTPATIENT)
Dept: INTERNAL MEDICINE CLINIC | Facility: CLINIC | Age: 68
End: 2023-05-23

## 2023-05-23 VITALS
HEART RATE: 73 BPM | BODY MASS INDEX: 26.36 KG/M2 | OXYGEN SATURATION: 96 % | WEIGHT: 164 LBS | SYSTOLIC BLOOD PRESSURE: 112 MMHG | HEIGHT: 66 IN | TEMPERATURE: 98 F | DIASTOLIC BLOOD PRESSURE: 70 MMHG

## 2023-05-23 DIAGNOSIS — M35.3 POLYMYALGIA RHEUMATICA SYNDROME (HCC): ICD-10-CM

## 2023-05-23 DIAGNOSIS — E78.2 MIXED HYPERLIPIDEMIA: Primary | ICD-10-CM

## 2023-05-23 DIAGNOSIS — F42.8 OTHER OBSESSIVE-COMPULSIVE DISORDERS: ICD-10-CM

## 2023-05-23 DIAGNOSIS — F33.9 DEPRESSION, RECURRENT (HCC): ICD-10-CM

## 2023-05-23 DIAGNOSIS — F41.9 ANXIETY: ICD-10-CM

## 2023-05-23 DIAGNOSIS — R73.01 IMPAIRED FASTING BLOOD SUGAR: ICD-10-CM

## 2023-05-23 RX ORDER — SIMVASTATIN 10 MG
10 TABLET ORAL
Qty: 90 TABLET | Refills: 1 | Status: SHIPPED | OUTPATIENT
Start: 2023-05-23

## 2023-05-23 RX ORDER — QUETIAPINE FUMARATE 50 MG/1
25 TABLET, FILM COATED ORAL
COMMUNITY
Start: 2023-05-22

## 2023-05-23 NOTE — PROGRESS NOTES
Assessment/Plan:             1  Mixed hyperlipidemia  -     simvastatin (ZOCOR) 10 mg tablet; Take 1 tablet (10 mg total) by mouth daily at bedtime  -     Comprehensive metabolic panel; Future  -     Lipid Panel with Direct LDL reflex; Future  -     TSH, 3rd generation; Future    2  Other obsessive-compulsive disorders    3  Polymyalgia rheumatica syndrome ([facility] [address] )    4  Impaired fasting blood sugar  -     CBC and differential; Future  -     Hemoglobin A1C; Future    5  Anxiety    6  Depression, recurrent ([facility] [address] )           Subjective:      Patient ID: Jesus Quesada is a 75 y o  male  Follow-up on multiple medical problems to ensure they are stable on current medications      The following portions of the patient's history were reviewed and updated as appropriate: He  has a past medical history of Anxiety, Depression, Hyperlipidemia, Kidney stone, Polymyalgia rheumatica syndrome ([address] ), and Psychiatric disorder  He   Patient Active Problem List    Diagnosis Date Noted   • Depression, recurrent ([facility] [address] ) 05/23/2023   • Polymyalgia rheumatica syndrome ([facility] [address] ) 05/18/2022   • Impaired fasting blood sugar 05/18/2022   • Other chest pain 11/17/2020   • Pain in left knee 05/23/2018   • Left facial numbness 12/18/2016   • History of temporal arteritis 12/18/2016   • HTN (hypertension) 12/18/2016   • Mixed hyperlipidemia 12/18/2016   • Acute nonintractable headache 12/18/2016   • Obsessive compulsive disorder 12/18/2016   • Anxiety 12/18/2016   • ADH disorder ([address] ) 12/18/2016     He  has a past surgical history that includes Appendectomy; Tonsillectomy; Kidney stone surgery; Transurethral resection of prostate; [place] tooth extraction; and Colonoscopy  His family history includes Diabetes in his mother; Heart attack in his father; Prostate cancer in his father  He  reports that he has never smoked  He has never used smokeless tobacco  He reports that he does not drink alcohol and does not use drugs    Current Outpatient Medications   Medication Sig Dispense Refill   • cariprazine (VRAYLAR) 1 5 MG capsule Take 1 5 mg by mouth daily     • clonazePAM (KlonoPIN) 0 5 mg tablet Take 0 5 mg by mouth 2 (two) times a day as needed for seizures 1/2 in AM 1/2 in PM and another 1/2 if needed     • folic acid (FOLVITE) 1 mg tablet Take 1 mg by mouth daily     • methotrexate 2 5 MG tablet Take by mouth 5 tablets on Sundays      • QUEtiapine (SEROquel) 50 mg tablet Take 25 mg by mouth daily at bedtime     • simvastatin (ZOCOR) 10 mg tablet Take 1 tablet (10 mg total) by mouth daily at bedtime 90 tablet 1   • vilazodone (VIIBRYD) 40 mg tablet Take 20 mg by mouth 2 (two) times a day Pt taking a half a pill       No current facility-administered medications for this visit  Current Outpatient Medications on File Prior to Visit   Medication Sig   • cariprazine (VRAYLAR) 1 5 MG capsule Take 1 5 mg by mouth daily   • clonazePAM (KlonoPIN) 0 5 mg tablet Take 0 5 mg by mouth 2 (two) times a day as needed for seizures 1/2 in AM 1/2 in PM and another 1/2 if needed   • folic acid (FOLVITE) 1 mg tablet Take 1 mg by mouth daily   • methotrexate 2 5 MG tablet Take by mouth 5 tablets on Sundays    • QUEtiapine (SEROquel) 50 mg tablet Take 25 mg by mouth daily at bedtime   • vilazodone (VIIBRYD) 40 mg tablet Take 20 mg by mouth 2 (two) times a day Pt taking a half a pill   • [DISCONTINUED] simvastatin (ZOCOR) 10 mg tablet Take 1 tablet (10 mg total) by mouth daily at bedtime   • [DISCONTINUED] amphetamine-dextroamphetamine (ADDERALL XR) 10 MG 24 hr capsule Take 10 mg by mouth every morning (Patient not taking: Reported on 5/23/2023)   • [DISCONTINUED] Brexpiprazole (REXULTI) 1 MG tablet Take 0 5 mg by mouth in the morning and 0 5 mg in the evening  (Patient not taking: Reported on 5/23/2023)     No current facility-administered medications on file prior to visit  He is allergic to tamsulosin       Review of Systems   Constitutional: Negative for chills and "fever  HENT: Negative for congestion, ear pain and sore throat  Eyes: Negative for pain  Respiratory: Negative for cough and shortness of breath  Cardiovascular: Negative for chest pain and leg swelling  Gastrointestinal: Negative for abdominal pain, nausea and vomiting  Endocrine: Negative for polyuria  Genitourinary: Negative for difficulty urinating, frequency and urgency  Musculoskeletal: Negative for arthralgias and back pain  Skin: Negative for rash  Neurological: Negative for weakness and headaches  Psychiatric/Behavioral: Negative for sleep disturbance  The patient is not nervous/anxious  Objective:      /70 (BP Location: Left arm, Patient Position: Sitting, Cuff Size: Large)   Pulse 73   Temp 98 °F (36 7 °C) (Temporal)   Ht 5' 6\" (1 676 m)   Wt 74 4 kg (164 lb)   SpO2 96%   BMI 26 47 kg/m²     No results found for this or any previous visit (from the past 1344 hour(s))  Physical Exam  Constitutional:       Appearance: Normal appearance  HENT:      Head: Normocephalic  Right Ear: Tympanic membrane, ear canal and external ear normal       Left Ear: Tympanic membrane, ear canal and external ear normal       Nose: Nose normal  No congestion  Mouth/Throat:      Mouth: Mucous membranes are moist       Pharynx: Oropharynx is clear  No oropharyngeal exudate or posterior oropharyngeal erythema  Eyes:      Extraocular Movements: Extraocular movements intact  Conjunctiva/sclera: Conjunctivae normal    Cardiovascular:      Rate and Rhythm: Normal rate and regular rhythm  Heart sounds: Normal heart sounds  No murmur heard  Pulmonary:      Effort: Pulmonary effort is normal       Breath sounds: Normal breath sounds  No wheezing or rales  Abdominal:      General: Bowel sounds are normal  There is no distension  Palpations: Abdomen is soft  Tenderness: There is no abdominal tenderness     Musculoskeletal:         General: Normal range of " motion  Cervical back: Normal range of motion and neck supple  Right lower leg: No edema  Left lower leg: No edema  Lymphadenopathy:      Cervical: No cervical adenopathy  Skin:     General: Skin is warm  Neurological:      General: No focal deficit present  Mental Status: He is alert and oriented to person, place, and time

## 2023-07-26 ENCOUNTER — APPOINTMENT (OUTPATIENT)
Dept: LAB | Facility: CLINIC | Age: 68
End: 2023-07-26
Payer: MEDICARE

## 2023-07-26 ENCOUNTER — RA CDI HCC (OUTPATIENT)
Dept: OTHER | Facility: HOSPITAL | Age: 68
End: 2023-07-26

## 2023-07-26 ENCOUNTER — TELEPHONE (OUTPATIENT)
Dept: UROLOGY | Facility: AMBULATORY SURGERY CENTER | Age: 68
End: 2023-07-26

## 2023-07-26 DIAGNOSIS — R73.01 IMPAIRED FASTING BLOOD SUGAR: ICD-10-CM

## 2023-07-26 DIAGNOSIS — N13.8 BPH WITH OBSTRUCTION/LOWER URINARY TRACT SYMPTOMS: ICD-10-CM

## 2023-07-26 DIAGNOSIS — E78.2 MIXED HYPERLIPIDEMIA: ICD-10-CM

## 2023-07-26 DIAGNOSIS — N40.1 BPH WITH OBSTRUCTION/LOWER URINARY TRACT SYMPTOMS: ICD-10-CM

## 2023-07-26 LAB
ALBUMIN SERPL BCP-MCNC: 4.4 G/DL (ref 3.5–5)
ALP SERPL-CCNC: 84 U/L (ref 34–104)
ALT SERPL W P-5'-P-CCNC: 19 U/L (ref 7–52)
ANION GAP SERPL CALCULATED.3IONS-SCNC: 5 MMOL/L
AST SERPL W P-5'-P-CCNC: 17 U/L (ref 13–39)
BASOPHILS # BLD AUTO: 0.03 THOUSANDS/ÂΜL (ref 0–0.1)
BASOPHILS NFR BLD AUTO: 1 % (ref 0–1)
BILIRUB SERPL-MCNC: 1.23 MG/DL (ref 0.2–1)
BUN SERPL-MCNC: 15 MG/DL (ref 5–25)
CALCIUM SERPL-MCNC: 9.6 MG/DL (ref 8.4–10.2)
CHLORIDE SERPL-SCNC: 105 MMOL/L (ref 96–108)
CHOLEST SERPL-MCNC: 175 MG/DL
CO2 SERPL-SCNC: 31 MMOL/L (ref 21–32)
CREAT SERPL-MCNC: 1.06 MG/DL (ref 0.6–1.3)
EOSINOPHIL # BLD AUTO: 0.15 THOUSAND/ÂΜL (ref 0–0.61)
EOSINOPHIL NFR BLD AUTO: 3 % (ref 0–6)
ERYTHROCYTE [DISTWIDTH] IN BLOOD BY AUTOMATED COUNT: 12.7 % (ref 11.6–15.1)
EST. AVERAGE GLUCOSE BLD GHB EST-MCNC: 131 MG/DL
GFR SERPL CREATININE-BSD FRML MDRD: 71 ML/MIN/1.73SQ M
GLUCOSE P FAST SERPL-MCNC: 102 MG/DL (ref 65–99)
HBA1C MFR BLD: 6.2 %
HCT VFR BLD AUTO: 44.7 % (ref 36.5–49.3)
HDLC SERPL-MCNC: 45 MG/DL
HGB BLD-MCNC: 15 G/DL (ref 12–17)
IMM GRANULOCYTES # BLD AUTO: 0.01 THOUSAND/UL (ref 0–0.2)
IMM GRANULOCYTES NFR BLD AUTO: 0 % (ref 0–2)
LDLC SERPL CALC-MCNC: 111 MG/DL (ref 0–100)
LYMPHOCYTES # BLD AUTO: 1.38 THOUSANDS/ÂΜL (ref 0.6–4.47)
LYMPHOCYTES NFR BLD AUTO: 30 % (ref 14–44)
MCH RBC QN AUTO: 30 PG (ref 26.8–34.3)
MCHC RBC AUTO-ENTMCNC: 33.6 G/DL (ref 31.4–37.4)
MCV RBC AUTO: 89 FL (ref 82–98)
MONOCYTES # BLD AUTO: 0.29 THOUSAND/ÂΜL (ref 0.17–1.22)
MONOCYTES NFR BLD AUTO: 6 % (ref 4–12)
NEUTROPHILS # BLD AUTO: 2.72 THOUSANDS/ÂΜL (ref 1.85–7.62)
NEUTS SEG NFR BLD AUTO: 60 % (ref 43–75)
NRBC BLD AUTO-RTO: 0 /100 WBCS
PLATELET # BLD AUTO: 209 THOUSANDS/UL (ref 149–390)
PMV BLD AUTO: 9.9 FL (ref 8.9–12.7)
POTASSIUM SERPL-SCNC: 4 MMOL/L (ref 3.5–5.3)
PROT SERPL-MCNC: 6.5 G/DL (ref 6.4–8.4)
PSA SERPL-MCNC: 2.43 NG/ML (ref 0–4)
RBC # BLD AUTO: 5 MILLION/UL (ref 3.88–5.62)
SODIUM SERPL-SCNC: 141 MMOL/L (ref 135–147)
TRIGL SERPL-MCNC: 94 MG/DL
TSH SERPL DL<=0.05 MIU/L-ACNC: 2.31 UIU/ML (ref 0.45–4.5)
WBC # BLD AUTO: 4.58 THOUSAND/UL (ref 4.31–10.16)

## 2023-07-26 PROCEDURE — 84153 ASSAY OF PSA TOTAL: CPT

## 2023-07-26 PROCEDURE — 84443 ASSAY THYROID STIM HORMONE: CPT

## 2023-07-26 PROCEDURE — 85025 COMPLETE CBC W/AUTO DIFF WBC: CPT

## 2023-07-26 PROCEDURE — 80053 COMPREHEN METABOLIC PANEL: CPT

## 2023-07-26 PROCEDURE — 80061 LIPID PANEL: CPT

## 2023-07-26 PROCEDURE — 36415 COLL VENOUS BLD VENIPUNCTURE: CPT

## 2023-07-26 PROCEDURE — 83036 HEMOGLOBIN GLYCOSYLATED A1C: CPT

## 2023-07-26 NOTE — TELEPHONE ENCOUNTER
[facility] calling in questioning about urine testing being placed in chart. I do not see an encounter for urine testing to be placed nor do I see this is needed in last ov note. Patient will have his PSA done at the lab and urine testing is normally done at time of ov. Lab states they will make patient aware and he will call us if urine testing is needed.
Medications

## 2023-08-02 ENCOUNTER — OFFICE VISIT (OUTPATIENT)
Dept: INTERNAL MEDICINE CLINIC | Facility: CLINIC | Age: 68
End: 2023-08-02
Payer: MEDICARE

## 2023-08-02 VITALS
SYSTOLIC BLOOD PRESSURE: 110 MMHG | BODY MASS INDEX: 25.71 KG/M2 | TEMPERATURE: 97.9 F | DIASTOLIC BLOOD PRESSURE: 68 MMHG | HEIGHT: 66 IN | HEART RATE: 71 BPM | WEIGHT: 160 LBS | OXYGEN SATURATION: 96 %

## 2023-08-02 DIAGNOSIS — F33.9 DEPRESSION, RECURRENT (HCC): ICD-10-CM

## 2023-08-02 DIAGNOSIS — F42.8 OTHER OBSESSIVE-COMPULSIVE DISORDERS: ICD-10-CM

## 2023-08-02 DIAGNOSIS — R73.01 IMPAIRED FASTING BLOOD SUGAR: Primary | ICD-10-CM

## 2023-08-02 DIAGNOSIS — Z00.00 MEDICARE ANNUAL WELLNESS VISIT, SUBSEQUENT: ICD-10-CM

## 2023-08-02 DIAGNOSIS — E78.2 MIXED HYPERLIPIDEMIA: ICD-10-CM

## 2023-08-02 DIAGNOSIS — M35.3 POLYMYALGIA RHEUMATICA SYNDROME (HCC): ICD-10-CM

## 2023-08-02 PROCEDURE — 99214 OFFICE O/P EST MOD 30 MIN: CPT | Performed by: INTERNAL MEDICINE

## 2023-08-02 PROCEDURE — G0438 PPPS, INITIAL VISIT: HCPCS | Performed by: INTERNAL MEDICINE

## 2023-08-02 RX ORDER — SIMVASTATIN 10 MG
10 TABLET ORAL
Qty: 90 TABLET | Refills: 1 | Status: SHIPPED | OUTPATIENT
Start: 2023-08-02

## 2023-08-02 RX ORDER — VILAZODONE HYDROCHLORIDE 20 MG/1
TABLET ORAL
COMMUNITY
Start: 2023-07-08

## 2023-08-02 NOTE — PROGRESS NOTES
Assessment and Plan:     Problem List Items Addressed This Visit        Endocrine    Impaired fasting blood sugar - Primary       Other    Mixed hyperlipidemia    Relevant Medications    simvastatin (ZOCOR) 10 mg tablet    Obsessive compulsive disorder    Relevant Medications    vilazodone (VIIBRYD) 20 mg tablet    Polymyalgia rheumatica syndrome ([address])    Medicare annual wellness visit, subsequent    Depression, recurrent (HCC)    Relevant Medications    vilazodone (VIIBRYD) 20 mg tablet        Preventive health issues were discussed with patient, and age appropriate screening tests were ordered as noted in patient's After Visit Summary. Personalized health advice and appropriate referrals for health education or preventive services given if needed, as noted in patient's After Visit Summary. History of Present Illness:     Patient presents for a Medicare Wellness Visit    Follow-up on blood test done on 7/26/2023 test discussed with him, also medical and his exam     Patient Care Team:  Mnauela Nowak MD as PCP - General (Internal Medicine)     Review of Systems:     Review of Systems   Constitutional: Negative for chills and fever. HENT: Negative for congestion, ear pain and sore throat. Eyes: Negative for pain. Respiratory: Negative for cough and shortness of breath. Cardiovascular: Negative for chest pain and leg swelling. Gastrointestinal: Negative for abdominal pain, nausea and vomiting. Endocrine: Negative for polyuria. Genitourinary: Negative for difficulty urinating, frequency and urgency. Musculoskeletal: Negative for arthralgias and back pain. Skin: Negative for rash. Neurological: Negative for weakness and headaches. Psychiatric/Behavioral: Negative for sleep disturbance. The patient is not nervous/anxious.          Problem List:     Patient Active Problem List   Diagnosis   • Left facial numbness   • History of temporal arteritis   • HTN (hypertension)   • Mixed hyperlipidemia • Acute nonintractable headache   • Obsessive compulsive disorder   • Anxiety   • ADH disorder (HCC)   • Pain in left knee   • Other chest pain   • Polymyalgia rheumatica syndrome (HCC)   • Impaired fasting blood sugar   • Medicare annual wellness visit, subsequent   • Depression, recurrent ([address])      Past Medical and Surgical History:     Past Medical History:   Diagnosis Date   • Anxiety    • Depression    • Hyperlipidemia    • Kidney stone    • Polymyalgia rheumatica syndrome ([address])    • Psychiatric disorder     anxiety     Past Surgical History:   Procedure Laterality Date   • APPENDECTOMY     • COLONOSCOPY     • KIDNEY STONE SURGERY     • TONSILLECTOMY     • TRANSURETHRAL RESECTION OF PROSTATE     • WISDOM TOOTH EXTRACTION        Family History:     Family History   Problem Relation Age of Onset   • Diabetes Mother    • Heart attack Father    • Prostate cancer Father       Social History:     Social History     Socioeconomic History   • Marital status: Single     Spouse name: None   • Number of children: None   • Years of education: None   • Highest education level: None   Occupational History   • None   Tobacco Use   • Smoking status: Never   • Smokeless tobacco: Never   Vaping Use   • Vaping Use: Never used   Substance and Sexual Activity   • Alcohol use: No   • Drug use: No   • Sexual activity: None   Other Topics Concern   • None   Social History Narrative   • None     Social Determinants of Health     Financial Resource Strain: Low Risk  (8/2/2023)    Overall Financial Resource Strain (CARDIA)    • Difficulty of Paying Living Expenses: Not hard at all   Food Insecurity: Not on file   Transportation Needs: No Transportation Needs (8/2/2023)    PRAPARE - Transportation    • Lack of Transportation (Medical): No    • Lack of Transportation (Non-Medical):  No   Physical Activity: Not on file   Stress: Not on file   Social Connections: Not on file   Intimate Partner Violence: Not on file   Housing Stability: Not on file      Medications and Allergies:     Current Outpatient Medications   Medication Sig Dispense Refill   • clonazePAM (KlonoPIN) 0.5 mg tablet Take 0.5 mg by mouth 2 (two) times a day as needed for seizures 1/2 in AM 1/2 in PM and another 1/2 if needed     • folic acid (FOLVITE) 1 mg tablet Take 1 mg by mouth daily     • methotrexate 2.5 MG tablet Take by mouth 5 tablets on Sundays      • QUEtiapine (SEROquel) 50 mg tablet Take 25 mg by mouth daily at bedtime     • simvastatin (ZOCOR) 10 mg tablet Take 1 tablet (10 mg total) by mouth daily at bedtime 90 tablet 1   • vilazodone (VIIBRYD) 20 mg tablet      • vilazodone (VIIBRYD) 40 mg tablet Take 20 mg by mouth 2 (two) times a day Pt taking a half a pill (Patient not taking: Reported on 8/2/2023)       No current facility-administered medications for this visit. Allergies   Allergen Reactions   • Tamsulosin Anxiety      Immunizations:     Immunization History   Administered Date(s) Administered   • COVID-19 PFIZER VACCINE 0.3 ML IM 02/17/2021, 03/09/2021, 11/27/2021      Health Maintenance:         Topic Date Due   • Colorectal Cancer Screening  02/08/2027   • Hepatitis C Screening  Completed         Topic Date Due   • Pneumococcal Vaccine: 65+ Years (1 - PCV) Never done   • COVID-19 Vaccine (4 - Booster for Pfizer series) 01/22/2022   • Influenza Vaccine (1) 09/01/2023      Medicare Screening Tests and Risk Assessments:     Amy Rodrigues is here for his Subsequent Wellness visit. Last Medicare Wellness visit information reviewed, patient interviewed and updates made to the record today. Health Risk Assessment:   Patient rates overall health as good. Patient feels that their physical health rating is slightly worse. Patient is dissatisfied with their life. Eyesight was rated as slightly worse. Hearing was rated as same. Patient feels that their emotional and mental health rating is slightly worse. Patients states they are never, rarely angry.  Patient states they are often unusually tired/fatigued. Pain experienced in the last 7 days has been none. Patient states that he has experienced no weight loss or gain in last 6 months. Depression Screening:   PHQ-9 Score: 2      Fall Risk Screening: In the past year, patient has experienced: no history of falling in past year      Home Safety:  Patient does not have trouble with stairs inside or outside of their home. Patient has working smoke alarms and has working carbon monoxide detector. Home safety hazards include: none. Nutrition:   Current diet is Regular. Medications:   Patient is not currently taking any over-the-counter supplements. Patient is able to manage medications. Activities of Daily Living (ADLs)/Instrumental Activities of Daily Living (IADLs):   Walk and transfer into and out of bed and chair?: Yes  Dress and groom yourself?: Yes    Bathe or shower yourself?: Yes    Feed yourself?  Yes  Do your laundry/housekeeping?: Yes  Manage your money, pay your bills and track your expenses?: Yes  Make your own meals?: Yes    Do your own shopping?: Yes    Previous Hospitalizations:   Any hospitalizations or ED visits within the last 12 months?: No      Advance Care Planning:   Living will: No    Durable POA for healthcare: No    Advanced directive: No    Five wishes given: Yes      Cognitive Screening:   Provider or family/friend/caregiver concerned regarding cognition?: No    PREVENTIVE SCREENINGS      Cardiovascular Screening:    General: Screening Not Indicated and History Lipid Disorder      Diabetes Screening:     General: Screening Current      Colorectal Cancer Screening:     General: Screening Current      Prostate Cancer Screening:    General: Screening Current      Abdominal Aortic Aneurysm (AAA) Screening:    Risk factors include: age between 70-75 yo        Lung Cancer Screening:     General: Screening Not Indicated      Hepatitis C Screening:    General: Screening Current    Screening, Brief Intervention, and Referral to Treatment (SBIRT)    Screening  Typical number of drinks in a day: 0  Typical number of drinks in a week: 0  Interpretation: Low risk drinking behavior. Single Item Drug Screening:  How often have you used an illegal drug (including marijuana) or a prescription medication for non-medical reasons in the past year? never    Single Item Drug Screen Score: 0  Interpretation: Negative screen for possible drug use disorder    No results found. Physical Exam:     /68 (BP Location: Left arm, Patient Position: Sitting, Cuff Size: Standard)   Pulse 71   Temp 97.9 °F (36.6 °C) (Temporal)   Ht 5' 6" (1.676 m)   Wt 72.6 kg (160 lb)   SpO2 96%   BMI 25.82 kg/m²     Physical Exam  Vitals and nursing note reviewed. Constitutional:       General: He is not in acute distress. Appearance: He is well-developed. HENT:      Head: Normocephalic and atraumatic. Right Ear: External ear normal.      Left Ear: External ear normal.      Mouth/Throat:      Pharynx: Oropharynx is clear. Eyes:      Extraocular Movements: Extraocular movements intact. Conjunctiva/sclera: Conjunctivae normal.   Cardiovascular:      Rate and Rhythm: Normal rate and regular rhythm. Heart sounds: Normal heart sounds. No murmur heard. Pulmonary:      Effort: Pulmonary effort is normal. No respiratory distress. Breath sounds: Normal breath sounds. Abdominal:      General: Abdomen is flat. Palpations: Abdomen is soft. Tenderness: There is no abdominal tenderness. Musculoskeletal:         General: No swelling. Cervical back: Neck supple. Right lower leg: No edema. Left lower leg: No edema. Skin:     General: Skin is warm and dry. Capillary Refill: Capillary refill takes less than 2 seconds. Neurological:      General: No focal deficit present. Mental Status: He is alert and oriented to person, place, and time.    Psychiatric:         Mood and Affect: Mood normal.          Ravinder De Anda MD

## 2023-08-07 ENCOUNTER — TELEPHONE (OUTPATIENT)
Dept: INTERNAL MEDICINE CLINIC | Facility: CLINIC | Age: 68
End: 2023-08-07

## 2023-08-07 DIAGNOSIS — R10.84 GENERALIZED ABDOMINAL PAIN: Primary | ICD-10-CM

## 2023-08-07 RX ORDER — DICYCLOMINE HYDROCHLORIDE 10 MG/1
10 CAPSULE ORAL 2 TIMES DAILY
Qty: 20 CAPSULE | Refills: 0 | Status: SHIPPED | OUTPATIENT
Start: 2023-08-07

## 2023-08-07 NOTE — TELEPHONE ENCOUNTER
He says he has been having problems on and off with diarrhea and stomach upset. He thought it was the meds he takes but went to his psychiatrist and was told that he has been on it for 2 years so these symptoms should not be due to medication. He is still having these symptoms so he wants to know what he should do?

## 2023-08-09 ENCOUNTER — TELEPHONE (OUTPATIENT)
Dept: INTERNAL MEDICINE CLINIC | Facility: CLINIC | Age: 68
End: 2023-08-09

## 2023-08-09 NOTE — TELEPHONE ENCOUNTER
patient called and stated he was prescribed Bentyl he stated it makes his stomach upset and makes his anxiety worse wants to know if there is anything else he can take sounds like he would like a call about this

## 2023-08-14 ENCOUNTER — OFFICE VISIT (OUTPATIENT)
Dept: UROLOGY | Facility: CLINIC | Age: 68
End: 2023-08-14
Payer: MEDICARE

## 2023-08-14 VITALS
DIASTOLIC BLOOD PRESSURE: 78 MMHG | OXYGEN SATURATION: 96 % | RESPIRATION RATE: 18 BRPM | SYSTOLIC BLOOD PRESSURE: 116 MMHG | WEIGHT: 160 LBS | HEIGHT: 66 IN | BODY MASS INDEX: 25.71 KG/M2

## 2023-08-14 DIAGNOSIS — N40.0 BENIGN PROSTATIC HYPERPLASIA WITHOUT LOWER URINARY TRACT SYMPTOMS: Primary | ICD-10-CM

## 2023-08-14 PROCEDURE — 99213 OFFICE O/P EST LOW 20 MIN: CPT | Performed by: PHYSICIAN ASSISTANT

## 2023-08-14 RX ORDER — DEXTROMETHORPHAN HYDROBROMIDE, BUPROPION HYDROCHLORIDE 105; 45 MG/1; MG/1
TABLET, MULTILAYER, EXTENDED RELEASE ORAL
COMMUNITY

## 2023-08-14 NOTE — PROGRESS NOTES
UROLOGY PROGRESS NOTE   Patient Identifiers: Maury Moe (MRN [id number])  Date of Service: 8/14/2023    Subjective:   43-year-old man history of BPH. He had a TURP in 2014. He did have some microscopic hematuria the sites. I treated him with Levaquin and tamsulosin. Current PSA 2.43.     Reason for visit: BPH follow-up    Objective:     VITALS:    Vitals:    08/14/23 1058   BP: 116/78   Resp: 18   SpO2: 96%           LABS:  Lab Results   Component Value Date    HGB 15.0 07/26/2023    HCT 44.7 07/26/2023    WBC 4.58 07/26/2023     07/26/2023   ]    Lab Results   Component Value Date     11/21/2015    K 4.0 07/26/2023     07/26/2023    CO2 31 07/26/2023    BUN 15 07/26/2023    CREATININE 1.06 07/26/2023    CALCIUM 9.6 07/26/2023    GLUCOSE 92 11/21/2015   ]        INPATIENT MEDS:    Current Outpatient Medications:   •  clonazePAM (KlonoPIN) 0.5 mg tablet, Take 0.5 mg by mouth 2 (two) times a day as needed for seizures 1/2 in AM 1/2 in PM and another 1/2 if needed, Disp: , Rfl:   •  Dextromethorphan-buPROPion ER (Auvelity)  MG TBCR, Take by mouth, Disp: , Rfl:   •  dicyclomine (BENTYL) 10 mg capsule, Take 1 capsule (10 mg total) by mouth 2 (two) times a day, Disp: 20 capsule, Rfl: 0  •  folic acid (FOLVITE) 1 mg tablet, Take 1 mg by mouth daily, Disp: , Rfl:   •  methotrexate 2.5 MG tablet, Take by mouth 5 tablets on Sundays , Disp: , Rfl:   •  QUEtiapine (SEROquel) 50 mg tablet, Take 25 mg by mouth daily at bedtime, Disp: , Rfl:   •  simvastatin (ZOCOR) 10 mg tablet, Take 1 tablet (10 mg total) by mouth daily at bedtime, Disp: 90 tablet, Rfl: 1  •  vilazodone (VIIBRYD) 20 mg tablet, , Disp: , Rfl:       Physical Exam:   /78 (BP Location: Left arm, Patient Position: Sitting, Cuff Size: Adult)   Resp 18   Ht 5' 6" (1.676 m)   Wt 72.6 kg (160 lb)   SpO2 96%   BMI 25.82 kg/m²   GEN: no acute distress    RESP: breathing comfortably with no accessory muscle use    ABD: soft, non-tender, non-distended   INCISION:    EXT: no significant peripheral edema   (Male): Penis circumcised, phallus normal, meatus patent. Testicles descended into scrotum bilaterally without masses nor tenderness. No inguinal hernias bilaterally. VIVIAN: Prostate is enlarged at 35 grams. The prostate is not boggy. The prostate is not tender. No nodules noted    RADIOLOGY:   none     Assessment:   1.  BPH     Plan:   -follow up in one year PSA prior  -  -  -

## 2023-08-16 DIAGNOSIS — E78.2 MIXED HYPERLIPIDEMIA: ICD-10-CM

## 2023-08-16 RX ORDER — SIMVASTATIN 10 MG
10 TABLET ORAL
Qty: 90 TABLET | Refills: 1 | OUTPATIENT
Start: 2023-08-16

## 2023-08-23 ENCOUNTER — RA CDI HCC (OUTPATIENT)
Dept: OTHER | Facility: HOSPITAL | Age: 68
End: 2023-08-23

## 2023-08-30 ENCOUNTER — OFFICE VISIT (OUTPATIENT)
Dept: INTERNAL MEDICINE CLINIC | Facility: CLINIC | Age: 68
End: 2023-08-30
Payer: MEDICARE

## 2023-08-30 VITALS
SYSTOLIC BLOOD PRESSURE: 118 MMHG | OXYGEN SATURATION: 96 % | HEART RATE: 86 BPM | BODY MASS INDEX: 25.99 KG/M2 | DIASTOLIC BLOOD PRESSURE: 70 MMHG | WEIGHT: 161 LBS | TEMPERATURE: 97.5 F

## 2023-08-30 DIAGNOSIS — M35.3 POLYMYALGIA RHEUMATICA SYNDROME (HCC): ICD-10-CM

## 2023-08-30 DIAGNOSIS — R73.01 IMPAIRED FASTING BLOOD SUGAR: ICD-10-CM

## 2023-08-30 DIAGNOSIS — F42.8 OTHER OBSESSIVE-COMPULSIVE DISORDERS: ICD-10-CM

## 2023-08-30 DIAGNOSIS — F33.9 DEPRESSION, RECURRENT (HCC): ICD-10-CM

## 2023-08-30 DIAGNOSIS — E78.2 MIXED HYPERLIPIDEMIA: Primary | ICD-10-CM

## 2023-08-30 PROCEDURE — 99214 OFFICE O/P EST MOD 30 MIN: CPT | Performed by: INTERNAL MEDICINE

## 2023-08-30 NOTE — PROGRESS NOTES
Assessment/Plan:             1. Mixed hyperlipidemia    2. Impaired fasting blood sugar    3. Polymyalgia rheumatica syndrome ([address])    4. Depression, recurrent ([address])    5. Other obsessive-compulsive disorders           Subjective:      Patient ID: Yaa Tidwell is a 75 y.o. male. Follow-up on multiple medical problems to ensure they are stable on current medications      The following portions of the patient's history were reviewed and updated as appropriate: He  has a past medical history of Anxiety, Depression, Hyperlipidemia, Kidney stone, Polymyalgia rheumatica syndrome ([address]), and Psychiatric disorder. He   Patient Active Problem List    Diagnosis Date Noted   • Depression, recurrent ([address]) 05/23/2023   • Medicare annual wellness visit, subsequent 07/20/2022   • Polymyalgia rheumatica syndrome ([address]) 05/18/2022   • Impaired fasting blood sugar 05/18/2022   • Other chest pain 11/17/2020   • Pain in left knee 05/23/2018   • Left facial numbness 12/18/2016   • History of temporal arteritis 12/18/2016   • HTN (hypertension) 12/18/2016   • Mixed hyperlipidemia 12/18/2016   • Acute nonintractable headache 12/18/2016   • Obsessive compulsive disorder 12/18/2016   • Anxiety 12/18/2016   • ADH disorder ([address]) 12/18/2016     He  has a past surgical history that includes Appendectomy; Tonsillectomy; Kidney stone surgery; Transurethral resection of prostate; [place] tooth extraction; and Colonoscopy. His family history includes Diabetes in his mother; Heart attack in his father; Prostate cancer in his father. He  reports that he has never smoked. He has never used smokeless tobacco. He reports that he does not drink alcohol and does not use drugs.   Current Outpatient Medications   Medication Sig Dispense Refill   • clonazePAM (KlonoPIN) 0.5 mg tablet Take 0.25 mg by mouth 2 (two) times a day as needed for seizures 1/2 in AM 1/2 in PM and another 1/2 if needed     • Dextromethorphan-buPROPion ER (Auvelity)  MG TBCR Take 1 tablet by mouth 2 (two) times a day     • dicyclomine (BENTYL) 10 mg capsule Take 1 capsule (10 mg total) by mouth 2 (two) times a day 20 capsule 0   • folic acid (FOLVITE) 1 mg tablet Take 1 mg by mouth daily     • methotrexate 2.5 MG tablet Take by mouth 5 tablets on Sundays      • QUEtiapine (SEROquel) 50 mg tablet Take 50 mg by mouth daily at bedtime     • simvastatin (ZOCOR) 10 mg tablet Take 1 tablet (10 mg total) by mouth daily at bedtime 90 tablet 1   • vilazodone (VIIBRYD) 20 mg tablet 40 mg daily with breakfast       No current facility-administered medications for this visit. Current Outpatient Medications on File Prior to Visit   Medication Sig   • clonazePAM (KlonoPIN) 0.5 mg tablet Take 0.25 mg by mouth 2 (two) times a day as needed for seizures 1/2 in AM 1/2 in PM and another 1/2 if needed   • Dextromethorphan-buPROPion ER (Auvelity)  MG TBCR Take 1 tablet by mouth 2 (two) times a day   • dicyclomine (BENTYL) 10 mg capsule Take 1 capsule (10 mg total) by mouth 2 (two) times a day   • folic acid (FOLVITE) 1 mg tablet Take 1 mg by mouth daily   • methotrexate 2.5 MG tablet Take by mouth 5 tablets on Sundays    • QUEtiapine (SEROquel) 50 mg tablet Take 50 mg by mouth daily at bedtime   • simvastatin (ZOCOR) 10 mg tablet Take 1 tablet (10 mg total) by mouth daily at bedtime   • vilazodone (VIIBRYD) 20 mg tablet 40 mg daily with breakfast     No current facility-administered medications on file prior to visit. He is allergic to tamsulosin. .    Review of Systems   Constitutional: Negative for chills and fever. HENT: Negative for congestion, ear pain and sore throat. Eyes: Negative for pain. Respiratory: Negative for cough and shortness of breath. Cardiovascular: Negative for chest pain and leg swelling. Gastrointestinal: Negative for abdominal pain, nausea and vomiting. Endocrine: Negative for polyuria.    Genitourinary: Negative for difficulty urinating, frequency and urgency. Musculoskeletal: Negative for arthralgias and back pain. Skin: Negative for rash. Neurological: Negative for weakness and headaches. Psychiatric/Behavioral: Negative for sleep disturbance. The patient is not nervous/anxious.           Objective:      /70 (BP Location: Left arm, Patient Position: Sitting, Cuff Size: Standard)   Pulse 86   Temp 97.5 °F (36.4 °C) (Temporal)   Wt 73 kg (161 lb)   SpO2 96%   BMI 25.99 kg/m²     Recent Results (from the past 1344 hour(s))   PSA Total, Diagnostic    Collection Time: 07/26/23  9:24 AM   Result Value Ref Range    PSA, Diagnostic 2.43 0.00 - 4.00 ng/mL   CBC and differential    Collection Time: 07/26/23  9:24 AM   Result Value Ref Range    WBC 4.58 4.31 - 10.16 Thousand/uL    RBC 5.00 3.88 - 5.62 Million/uL    Hemoglobin 15.0 12.0 - 17.0 g/dL    Hematocrit 44.7 36.5 - 49.3 %    MCV 89 82 - 98 fL    MCH 30.0 26.8 - 34.3 pg    MCHC 33.6 31.4 - 37.4 g/dL    RDW 12.7 11.6 - 15.1 %    MPV 9.9 8.9 - 12.7 fL    Platelets 201 537 - 850 Thousands/uL    nRBC 0 /100 WBCs    Neutrophils Relative 60 43 - 75 %    Immat GRANS % 0 0 - 2 %    Lymphocytes Relative 30 14 - 44 %    Monocytes Relative 6 4 - 12 %    Eosinophils Relative 3 0 - 6 %    Basophils Relative 1 0 - 1 %    Neutrophils Absolute 2.72 1.85 - 7.62 Thousands/µL    Immature Grans Absolute 0.01 0.00 - 0.20 Thousand/uL    Lymphocytes Absolute 1.38 0.60 - 4.47 Thousands/µL    Monocytes Absolute 0.29 0.17 - 1.22 Thousand/µL    Eosinophils Absolute 0.15 0.00 - 0.61 Thousand/µL    Basophils Absolute 0.03 0.00 - 0.10 Thousands/µL   Comprehensive metabolic panel    Collection Time: 07/26/23  9:24 AM   Result Value Ref Range    Sodium 141 135 - 147 mmol/L    Potassium 4.0 3.5 - 5.3 mmol/L    Chloride 105 96 - 108 mmol/L    CO2 31 21 - 32 mmol/L    ANION GAP 5 mmol/L    BUN 15 5 - 25 mg/dL    Creatinine 1.06 0.60 - 1.30 mg/dL    Glucose, Fasting 102 (H) 65 - 99 mg/dL    Calcium 9.6 8.4 - 10.2 mg/dL    AST 17 13 - 39 U/L    ALT 19 7 - 52 U/L    Alkaline Phosphatase 84 34 - 104 U/L    Total Protein 6.5 6.4 - 8.4 g/dL    Albumin 4.4 3.5 - 5.0 g/dL    Total Bilirubin 1.23 (H) 0.20 - 1.00 mg/dL    eGFR 71 ml/min/1.73sq m   Hemoglobin A1C    Collection Time: 07/26/23  9:24 AM   Result Value Ref Range    Hemoglobin A1C 6.2 (H) Normal 4.0-5.6%; PreDiabetic 5.7-6.4%; Diabetic >=6.5%; Glycemic control for adults with diabetes <7.0% %     mg/dl   Lipid Panel with Direct LDL reflex    Collection Time: 07/26/23  9:24 AM   Result Value Ref Range    Cholesterol 175 See Comment mg/dL    Triglycerides 94 See Comment mg/dL    HDL, Direct 45 >=40 mg/dL    LDL Calculated 111 (H) 0 - 100 mg/dL   TSH, 3rd generation    Collection Time: 07/26/23  9:24 AM   Result Value Ref Range    TSH 3RD GENERATON 2.315 0.450 - 4.500 uIU/mL        Physical Exam  Constitutional:       Appearance: Normal appearance. HENT:      Head: Normocephalic. Right Ear: External ear normal.      Left Ear: External ear normal.      Nose: Nose normal. No congestion. Mouth/Throat:      Mouth: Mucous membranes are moist.      Pharynx: Oropharynx is clear. No oropharyngeal exudate or posterior oropharyngeal erythema. Eyes:      Extraocular Movements: Extraocular movements intact. Conjunctiva/sclera: Conjunctivae normal.   Cardiovascular:      Rate and Rhythm: Normal rate and regular rhythm. Heart sounds: Normal heart sounds. No murmur heard. Pulmonary:      Effort: Pulmonary effort is normal.      Breath sounds: Normal breath sounds. No wheezing or rales. Abdominal:      General: Bowel sounds are normal. There is no distension. Palpations: Abdomen is soft. Tenderness: There is no abdominal tenderness. Musculoskeletal:      Cervical back: Normal range of motion and neck supple. Right lower leg: No edema. Left lower leg: No edema. Lymphadenopathy:      Cervical: No cervical adenopathy. Skin:     General: Skin is warm. Neurological:      General: No focal deficit present. Mental Status: He is alert and oriented to person, place, and time.

## 2023-09-27 ENCOUNTER — OFFICE VISIT (OUTPATIENT)
Dept: INTERNAL MEDICINE CLINIC | Facility: CLINIC | Age: 68
End: 2023-09-27
Payer: MEDICARE

## 2023-09-27 VITALS
HEIGHT: 66 IN | OXYGEN SATURATION: 95 % | WEIGHT: 166 LBS | SYSTOLIC BLOOD PRESSURE: 114 MMHG | DIASTOLIC BLOOD PRESSURE: 68 MMHG | HEART RATE: 94 BPM | TEMPERATURE: 97.6 F | BODY MASS INDEX: 26.68 KG/M2

## 2023-09-27 DIAGNOSIS — F33.9 DEPRESSION, RECURRENT (HCC): ICD-10-CM

## 2023-09-27 DIAGNOSIS — M35.3 POLYMYALGIA RHEUMATICA SYNDROME (HCC): ICD-10-CM

## 2023-09-27 DIAGNOSIS — E78.2 MIXED HYPERLIPIDEMIA: Primary | ICD-10-CM

## 2023-09-27 DIAGNOSIS — R73.01 IMPAIRED FASTING BLOOD SUGAR: ICD-10-CM

## 2023-09-27 DIAGNOSIS — F42.9 OBSESSIVE-COMPULSIVE DISORDER, UNSPECIFIED TYPE: ICD-10-CM

## 2023-09-27 DIAGNOSIS — Z23 NEEDS FLU SHOT: ICD-10-CM

## 2023-09-27 DIAGNOSIS — E22.2 ADH DISORDER: Chronic | ICD-10-CM

## 2023-09-27 PROCEDURE — 90662 IIV NO PRSV INCREASED AG IM: CPT

## 2023-09-27 PROCEDURE — 99214 OFFICE O/P EST MOD 30 MIN: CPT | Performed by: INTERNAL MEDICINE

## 2023-09-27 PROCEDURE — G0008 ADMIN INFLUENZA VIRUS VAC: HCPCS

## 2023-09-27 RX ORDER — CLOMIPRAMINE HYDROCHLORIDE 25 MG/1
25 CAPSULE ORAL
COMMUNITY
Start: 2023-09-12

## 2023-09-27 NOTE — PROGRESS NOTES
Assessment/Plan:  Advised him to drink more fluids, needs to call me if he gets worse, or not better, as far as dizziness goes. 1. Mixed hyperlipidemia    2. Polymyalgia rheumatica syndrome ([address])    3. Impaired fasting blood sugar    4. Depression, recurrent ([address])    5. ADH disorder ([address])    6. Obsessive-compulsive disorder, unspecified type           Subjective:      Patient ID: Cosme Grover is a 75 y.o. male. Follow-up on multiple medical problems to ensure they are stable on current medications, dizziness sometimes      The following portions of the patient's history were reviewed and updated as appropriate: He  has a past medical history of Anxiety, Depression, Hyperlipidemia, Kidney stone, Polymyalgia rheumatica syndrome ([address]), and Psychiatric disorder. He   Patient Active Problem List    Diagnosis Date Noted   • Depression, recurrent ([address]) 05/23/2023   • Medicare annual wellness visit, subsequent 07/20/2022   • Polymyalgia rheumatica syndrome ([address]) 05/18/2022   • Impaired fasting blood sugar 05/18/2022   • Other chest pain 11/17/2020   • Pain in left knee 05/23/2018   • Left facial numbness 12/18/2016   • History of temporal arteritis 12/18/2016   • HTN (hypertension) 12/18/2016   • Mixed hyperlipidemia 12/18/2016   • Acute nonintractable headache 12/18/2016   • Obsessive compulsive disorder 12/18/2016   • Anxiety 12/18/2016   • ADH disorder ([address]) 12/18/2016     He  has a past surgical history that includes Appendectomy; Tonsillectomy; Kidney stone surgery; Transurethral resection of prostate; [place] tooth extraction; and Colonoscopy. His family history includes Diabetes in his mother; Heart attack in his father; Prostate cancer in his father. He  reports that he has never smoked. He has never used smokeless tobacco. He reports that he does not drink alcohol and does not use drugs.   Current Outpatient Medications   Medication Sig Dispense Refill   • clomiPRAMINE (ANAFRANIL) 25 mg capsule Take 25 mg by mouth daily at bedtime     • clonazePAM (KlonoPIN) 0.5 mg tablet Take 0.25 mg by mouth 2 (two) times a day as needed for seizures 1/2 in AM 1/2 in PM and another 1/2 if needed     • folic acid (FOLVITE) 1 mg tablet Take 1 mg by mouth daily     • methotrexate 2.5 MG tablet Take by mouth 5 tablets on Sundays      • QUEtiapine (SEROquel) 50 mg tablet Take 50 mg by mouth daily at bedtime     • simvastatin (ZOCOR) 10 mg tablet Take 1 tablet (10 mg total) by mouth daily at bedtime 90 tablet 1   • vilazodone (VIIBRYD) 20 mg tablet 40 mg daily with breakfast     • Dextromethorphan-buPROPion ER (Auvelity)  MG TBCR Take 1 tablet by mouth 2 (two) times a day (Patient not taking: Reported on 9/27/2023)     • dicyclomine (BENTYL) 10 mg capsule Take 1 capsule (10 mg total) by mouth 2 (two) times a day (Patient not taking: Reported on 9/27/2023) 20 capsule 0     No current facility-administered medications for this visit.      Current Outpatient Medications on File Prior to Visit   Medication Sig   • clomiPRAMINE (ANAFRANIL) 25 mg capsule Take 25 mg by mouth daily at bedtime   • clonazePAM (KlonoPIN) 0.5 mg tablet Take 0.25 mg by mouth 2 (two) times a day as needed for seizures 1/2 in AM 1/2 in PM and another 1/2 if needed   • folic acid (FOLVITE) 1 mg tablet Take 1 mg by mouth daily   • methotrexate 2.5 MG tablet Take by mouth 5 tablets on Sundays    • QUEtiapine (SEROquel) 50 mg tablet Take 50 mg by mouth daily at bedtime   • simvastatin (ZOCOR) 10 mg tablet Take 1 tablet (10 mg total) by mouth daily at bedtime   • vilazodone (VIIBRYD) 20 mg tablet 40 mg daily with breakfast   • Dextromethorphan-buPROPion ER (Auvelity)  MG TBCR Take 1 tablet by mouth 2 (two) times a day (Patient not taking: Reported on 9/27/2023)   • dicyclomine (BENTYL) 10 mg capsule Take 1 capsule (10 mg total) by mouth 2 (two) times a day (Patient not taking: Reported on 9/27/2023)     No current facility-administered medications on file prior to visit. He is allergic to tamsulosin. .    Review of Systems   Constitutional: Negative for chills and fever. HENT: Negative for congestion, ear pain and sore throat. Eyes: Negative for pain. Respiratory: Negative for cough and shortness of breath. Cardiovascular: Negative for chest pain and leg swelling. Gastrointestinal: Negative for abdominal pain, nausea and vomiting. Endocrine: Negative for polyuria. Genitourinary: Negative for difficulty urinating, frequency and urgency. Musculoskeletal: Negative for arthralgias and back pain. Skin: Negative for rash. Neurological: Negative for weakness and headaches. Psychiatric/Behavioral: Negative for sleep disturbance. The patient is not nervous/anxious. Objective:      /68 (BP Location: Left arm, Patient Position: Sitting, Cuff Size: Large)   Pulse 94   Temp 97.6 °F (36.4 °C) (Temporal)   Ht 5' 6" (1.676 m)   Wt 75.3 kg (166 lb)   SpO2 95%   BMI 26.79 kg/m²     No results found for this or any previous visit (from the past 1344 hour(s)). Physical Exam  Constitutional:       Appearance: Normal appearance. HENT:      Head: Normocephalic. Right Ear: Tympanic membrane, ear canal and external ear normal.      Left Ear: Tympanic membrane, ear canal and external ear normal.      Nose: Nose normal. No congestion. Mouth/Throat:      Mouth: Mucous membranes are moist.      Pharynx: Oropharynx is clear. No oropharyngeal exudate or posterior oropharyngeal erythema. Eyes:      Extraocular Movements: Extraocular movements intact. Conjunctiva/sclera: Conjunctivae normal.   Cardiovascular:      Rate and Rhythm: Normal rate and regular rhythm. Heart sounds: Normal heart sounds. No murmur heard. Pulmonary:      Effort: Pulmonary effort is normal.      Breath sounds: Normal breath sounds. No wheezing or rales. Abdominal:      General: Bowel sounds are normal. There is no distension. Palpations: Abdomen is soft. Tenderness: There is no abdominal tenderness. Musculoskeletal:         General: Normal range of motion. Cervical back: Normal range of motion and neck supple. Right lower leg: No edema. Left lower leg: No edema. Lymphadenopathy:      Cervical: No cervical adenopathy. Skin:     General: Skin is warm. Neurological:      General: No focal deficit present. Mental Status: He is alert and oriented to person, place, and time.

## 2023-09-28 ENCOUNTER — APPOINTMENT (OUTPATIENT)
Dept: LAB | Facility: CLINIC | Age: 68
End: 2023-09-28
Payer: MEDICARE

## 2023-09-28 DIAGNOSIS — N40.0 BENIGN PROSTATIC HYPERPLASIA WITHOUT LOWER URINARY TRACT SYMPTOMS: ICD-10-CM

## 2023-09-28 DIAGNOSIS — M35.3 POLYMYALGIA RHEUMATICA (HCC): ICD-10-CM

## 2023-09-28 LAB
ALBUMIN SERPL BCP-MCNC: 4.3 G/DL (ref 3.5–5)
ALP SERPL-CCNC: 90 U/L (ref 34–104)
ALT SERPL W P-5'-P-CCNC: 25 U/L (ref 7–52)
ANION GAP SERPL CALCULATED.3IONS-SCNC: 5 MMOL/L
AST SERPL W P-5'-P-CCNC: 19 U/L (ref 13–39)
BASOPHILS # BLD AUTO: 0.03 THOUSANDS/ÂΜL (ref 0–0.1)
BASOPHILS NFR BLD AUTO: 1 % (ref 0–1)
BILIRUB SERPL-MCNC: 0.6 MG/DL (ref 0.2–1)
BUN SERPL-MCNC: 14 MG/DL (ref 5–25)
CALCIUM SERPL-MCNC: 9.3 MG/DL (ref 8.4–10.2)
CHLORIDE SERPL-SCNC: 102 MMOL/L (ref 96–108)
CO2 SERPL-SCNC: 29 MMOL/L (ref 21–32)
CREAT SERPL-MCNC: 1.06 MG/DL (ref 0.6–1.3)
CRP SERPL QL: 1.5 MG/L
EOSINOPHIL # BLD AUTO: 0.14 THOUSAND/ÂΜL (ref 0–0.61)
EOSINOPHIL NFR BLD AUTO: 3 % (ref 0–6)
ERYTHROCYTE [DISTWIDTH] IN BLOOD BY AUTOMATED COUNT: 13.2 % (ref 11.6–15.1)
ERYTHROCYTE [SEDIMENTATION RATE] IN BLOOD: 2 MM/HOUR (ref 0–19)
GFR SERPL CREATININE-BSD FRML MDRD: 71 ML/MIN/1.73SQ M
GLUCOSE P FAST SERPL-MCNC: 123 MG/DL (ref 65–99)
HCT VFR BLD AUTO: 44.6 % (ref 36.5–49.3)
HGB BLD-MCNC: 15.2 G/DL (ref 12–17)
IMM GRANULOCYTES # BLD AUTO: 0.01 THOUSAND/UL (ref 0–0.2)
IMM GRANULOCYTES NFR BLD AUTO: 0 % (ref 0–2)
LYMPHOCYTES # BLD AUTO: 0.64 THOUSANDS/ÂΜL (ref 0.6–4.47)
LYMPHOCYTES NFR BLD AUTO: 12 % (ref 14–44)
MCH RBC QN AUTO: 31.1 PG (ref 26.8–34.3)
MCHC RBC AUTO-ENTMCNC: 34.1 G/DL (ref 31.4–37.4)
MCV RBC AUTO: 91 FL (ref 82–98)
MONOCYTES # BLD AUTO: 0.39 THOUSAND/ÂΜL (ref 0.17–1.22)
MONOCYTES NFR BLD AUTO: 7 % (ref 4–12)
NEUTROPHILS # BLD AUTO: 4.12 THOUSANDS/ÂΜL (ref 1.85–7.62)
NEUTS SEG NFR BLD AUTO: 77 % (ref 43–75)
NRBC BLD AUTO-RTO: 0 /100 WBCS
PLATELET # BLD AUTO: 191 THOUSANDS/UL (ref 149–390)
PMV BLD AUTO: 9.6 FL (ref 8.9–12.7)
POTASSIUM SERPL-SCNC: 4.2 MMOL/L (ref 3.5–5.3)
PROT SERPL-MCNC: 6.6 G/DL (ref 6.4–8.4)
RBC # BLD AUTO: 4.89 MILLION/UL (ref 3.88–5.62)
SODIUM SERPL-SCNC: 136 MMOL/L (ref 135–147)
WBC # BLD AUTO: 5.33 THOUSAND/UL (ref 4.31–10.16)

## 2023-09-28 PROCEDURE — 36415 COLL VENOUS BLD VENIPUNCTURE: CPT

## 2023-09-28 PROCEDURE — 85652 RBC SED RATE AUTOMATED: CPT

## 2023-09-28 PROCEDURE — 86140 C-REACTIVE PROTEIN: CPT

## 2023-09-28 PROCEDURE — 80053 COMPREHEN METABOLIC PANEL: CPT

## 2023-09-28 PROCEDURE — 85025 COMPLETE CBC W/AUTO DIFF WBC: CPT

## 2023-10-01 PROBLEM — Z00.00 MEDICARE ANNUAL WELLNESS VISIT, SUBSEQUENT: Status: RESOLVED | Noted: 2022-07-20 | Resolved: 2023-10-01

## 2023-11-03 ENCOUNTER — OFFICE VISIT (OUTPATIENT)
Dept: INTERNAL MEDICINE CLINIC | Facility: CLINIC | Age: 68
End: 2023-11-03
Payer: MEDICARE

## 2023-11-03 VITALS
SYSTOLIC BLOOD PRESSURE: 112 MMHG | OXYGEN SATURATION: 97 % | HEART RATE: 96 BPM | WEIGHT: 166 LBS | BODY MASS INDEX: 26.68 KG/M2 | DIASTOLIC BLOOD PRESSURE: 72 MMHG | TEMPERATURE: 97.5 F | HEIGHT: 66 IN

## 2023-11-03 DIAGNOSIS — K21.9 GASTROESOPHAGEAL REFLUX DISEASE WITHOUT ESOPHAGITIS: ICD-10-CM

## 2023-11-03 DIAGNOSIS — K42.9 UMBILICAL HERNIA WITHOUT OBSTRUCTION AND WITHOUT GANGRENE: Primary | ICD-10-CM

## 2023-11-03 PROCEDURE — 99214 OFFICE O/P EST MOD 30 MIN: CPT | Performed by: INTERNAL MEDICINE

## 2023-11-03 RX ORDER — PANTOPRAZOLE SODIUM 40 MG/1
40 TABLET, DELAYED RELEASE ORAL
Qty: 30 TABLET | Refills: 0 | Status: SHIPPED | OUTPATIENT
Start: 2023-11-03 | End: 2024-05-01

## 2023-11-03 NOTE — PROGRESS NOTES
Assessment/Plan:             1. Umbilical hernia without obstruction and without gangrene  -     Ambulatory Referral to General Surgery; Future    2. Gastroesophageal reflux disease without esophagitis  -     pantoprazole (PROTONIX) 40 mg tablet; Take 1 tablet (40 mg total) by mouth daily before breakfast           Subjective:      Patient ID: Ginette Clark is a 75 y.o. male. Abdominal pain, sometimes feels acid reflux, also pain at the bellybutton,    Abdominal Pain  Pertinent negatives include no arthralgias, fever, frequency, headaches, nausea or vomiting. The following portions of the patient's history were reviewed and updated as appropriate: He  has a past medical history of Anxiety, Depression, Hyperlipidemia, Kidney stone, Polymyalgia rheumatica syndrome ([address]), and Psychiatric disorder. He   Patient Active Problem List    Diagnosis Date Noted    Umbilical hernia without obstruction and without gangrene 11/03/2023    Gastroesophageal reflux disease without esophagitis 11/03/2023    Depression, recurrent ([address]) 05/23/2023    Polymyalgia rheumatica syndrome ([address]) 05/18/2022    Impaired fasting blood sugar 05/18/2022    Other chest pain 11/17/2020    Pain in left knee 05/23/2018    Left facial numbness 12/18/2016    History of temporal arteritis 12/18/2016    HTN (hypertension) 12/18/2016    Mixed hyperlipidemia 12/18/2016    Acute nonintractable headache 12/18/2016    Obsessive compulsive disorder 12/18/2016    Anxiety 12/18/2016    ADH disorder 12/18/2016     He  has a past surgical history that includes Appendectomy; Tonsillectomy; Kidney stone surgery; Transurethral resection of prostate; [place] tooth extraction; and Colonoscopy. His family history includes Diabetes in his mother; Heart attack in his father; Prostate cancer in his father. He  reports that he has never smoked. He has never used smokeless tobacco. He reports that he does not drink alcohol and does not use drugs.   Current Outpatient Medications   Medication Sig Dispense Refill    clomiPRAMINE (ANAFRANIL) 25 mg capsule Take 25 mg by mouth daily at bedtime      clonazePAM (KlonoPIN) 0.5 mg tablet Take 0.25 mg by mouth 2 (two) times a day as needed for seizures 1/2 in AM 1/2 in PM and another 1/2 if needed      folic acid (FOLVITE) 1 mg tablet Take 1 mg by mouth daily      methotrexate 2.5 MG tablet Take by mouth 5 tablets on Sundays       pantoprazole (PROTONIX) 40 mg tablet Take 1 tablet (40 mg total) by mouth daily before breakfast 30 tablet 0    QUEtiapine (SEROquel) 50 mg tablet Take 50 mg by mouth daily at bedtime      simvastatin (ZOCOR) 10 mg tablet Take 1 tablet (10 mg total) by mouth daily at bedtime 90 tablet 1    vilazodone (VIIBRYD) 20 mg tablet 40 mg daily with breakfast      dicyclomine (BENTYL) 10 mg capsule Take 1 capsule (10 mg total) by mouth 2 (two) times a day (Patient not taking: Reported on 9/27/2023) 20 capsule 0     No current facility-administered medications for this visit.      Current Outpatient Medications on File Prior to Visit   Medication Sig    clomiPRAMINE (ANAFRANIL) 25 mg capsule Take 25 mg by mouth daily at bedtime    clonazePAM (KlonoPIN) 0.5 mg tablet Take 0.25 mg by mouth 2 (two) times a day as needed for seizures 1/2 in AM 1/2 in PM and another 1/2 if needed    folic acid (FOLVITE) 1 mg tablet Take 1 mg by mouth daily    methotrexate 2.5 MG tablet Take by mouth 5 tablets on Sundays     QUEtiapine (SEROquel) 50 mg tablet Take 50 mg by mouth daily at bedtime    simvastatin (ZOCOR) 10 mg tablet Take 1 tablet (10 mg total) by mouth daily at bedtime    vilazodone (VIIBRYD) 20 mg tablet 40 mg daily with breakfast    dicyclomine (BENTYL) 10 mg capsule Take 1 capsule (10 mg total) by mouth 2 (two) times a day (Patient not taking: Reported on 9/27/2023)    [DISCONTINUED] Dextromethorphan-buPROPion ER (Auvelity)  MG TBCR Take 1 tablet by mouth 2 (two) times a day (Patient not taking: Reported on 9/27/2023)     No current facility-administered medications on file prior to visit. He is allergic to tamsulosin. .    Review of Systems   Constitutional:  Negative for chills and fever. HENT:  Negative for congestion, ear pain and sore throat. Eyes:  Negative for pain. Respiratory:  Negative for cough and shortness of breath. Cardiovascular:  Negative for chest pain and leg swelling. Gastrointestinal:  Positive for abdominal pain. Negative for nausea and vomiting. Endocrine: Negative for polyuria. Genitourinary:  Negative for difficulty urinating, frequency and urgency. Musculoskeletal:  Negative for arthralgias and back pain. Skin:  Negative for rash. Neurological:  Negative for weakness and headaches. Psychiatric/Behavioral:  Negative for sleep disturbance. The patient is not nervous/anxious.           Objective:      /72 (BP Location: Left arm, Patient Position: Sitting, Cuff Size: Standard)   Pulse 96   Temp 97.5 °F (36.4 °C) (Temporal)   Ht 5' 6" (1.676 m)   Wt 75.3 kg (166 lb)   SpO2 97%   BMI 26.79 kg/m²     Recent Results (from the past 1344 hour(s))   CBC and differential    Collection Time: 09/28/23 11:51 AM   Result Value Ref Range    WBC 5.33 4.31 - 10.16 Thousand/uL    RBC 4.89 3.88 - 5.62 Million/uL    Hemoglobin 15.2 12.0 - 17.0 g/dL    Hematocrit 44.6 36.5 - 49.3 %    MCV 91 82 - 98 fL    MCH 31.1 26.8 - 34.3 pg    MCHC 34.1 31.4 - 37.4 g/dL    RDW 13.2 11.6 - 15.1 %    MPV 9.6 8.9 - 12.7 fL    Platelets 356 940 - 078 Thousands/uL    nRBC 0 /100 WBCs    Neutrophils Relative 77 (H) 43 - 75 %    Immat GRANS % 0 0 - 2 %    Lymphocytes Relative 12 (L) 14 - 44 %    Monocytes Relative 7 4 - 12 %    Eosinophils Relative 3 0 - 6 %    Basophils Relative 1 0 - 1 %    Neutrophils Absolute 4.12 1.85 - 7.62 Thousands/µL    Immature Grans Absolute 0.01 0.00 - 0.20 Thousand/uL    Lymphocytes Absolute 0.64 0.60 - 4.47 Thousands/µL    Monocytes Absolute 0.39 0.17 - 1.22 Thousand/µL    Eosinophils Absolute 0.14 0.00 - 0.61 Thousand/µL    Basophils Absolute 0.03 0.00 - 0.10 Thousands/µL   Comprehensive metabolic panel    Collection Time: 09/28/23 11:51 AM   Result Value Ref Range    Sodium 136 135 - 147 mmol/L    Potassium 4.2 3.5 - 5.3 mmol/L    Chloride 102 96 - 108 mmol/L    CO2 29 21 - 32 mmol/L    ANION GAP 5 mmol/L    BUN 14 5 - 25 mg/dL    Creatinine 1.06 0.60 - 1.30 mg/dL    Glucose, Fasting 123 (H) 65 - 99 mg/dL    Calcium 9.3 8.4 - 10.2 mg/dL    AST 19 13 - 39 U/L    ALT 25 7 - 52 U/L    Alkaline Phosphatase 90 34 - 104 U/L    Total Protein 6.6 6.4 - 8.4 g/dL    Albumin 4.3 3.5 - 5.0 g/dL    Total Bilirubin 0.60 0.20 - 1.00 mg/dL    eGFR 71 ml/min/1.73sq m   C-reactive protein    Collection Time: 09/28/23 11:51 AM   Result Value Ref Range    CRP 1.5 <3.0 mg/L   Sedimentation rate, automated    Collection Time: 09/28/23 11:51 AM   Result Value Ref Range    Sed Rate 2 0 - 19 mm/hour        Physical Exam  Constitutional:       Appearance: Normal appearance. HENT:      Head: Normocephalic. Right Ear: External ear normal.      Left Ear: External ear normal.      Nose: Nose normal. No congestion. Mouth/Throat:      Mouth: Mucous membranes are moist.      Pharynx: Oropharynx is clear. No oropharyngeal exudate or posterior oropharyngeal erythema. Eyes:      Extraocular Movements: Extraocular movements intact. Conjunctiva/sclera: Conjunctivae normal.   Cardiovascular:      Rate and Rhythm: Normal rate and regular rhythm. Heart sounds: Normal heart sounds. No murmur heard. Pulmonary:      Effort: Pulmonary effort is normal.      Breath sounds: Normal breath sounds. No wheezing or rales. Abdominal:      General: Abdomen is flat. There is no distension. Palpations: Abdomen is soft. Tenderness: There is abdominal tenderness.       Comments: Mild tenderness at the bellybutton, skin looks normal, no active discharge, impulse on coughing positive Musculoskeletal:         General: Normal range of motion. Cervical back: Normal range of motion and neck supple. Right lower leg: No edema. Left lower leg: No edema. Lymphadenopathy:      Cervical: No cervical adenopathy. Skin:     General: Skin is warm. Neurological:      General: No focal deficit present. Mental Status: He is alert and oriented to person, place, and time.

## 2023-11-06 ENCOUNTER — TELEPHONE (OUTPATIENT)
Dept: INTERNAL MEDICINE CLINIC | Facility: CLINIC | Age: 68
End: 2023-11-06

## 2023-11-06 NOTE — TELEPHONE ENCOUNTER
Patient called and would like a call back today he is still having pins and needles all over body .  Could you please give him a call today cell phone number is [phone]

## 2023-11-07 NOTE — TELEPHONE ENCOUNTER
Called patient to let him know , he stated he is going to hold off on his Covid vaccine to see how he feels in a week ,

## 2023-11-10 ENCOUNTER — TELEPHONE (OUTPATIENT)
Dept: INTERNAL MEDICINE CLINIC | Facility: CLINIC | Age: 68
End: 2023-11-10

## 2023-11-10 NOTE — TELEPHONE ENCOUNTER
Patient called and would like a call back his stomach is sore and  upset and wants to know if there is anything he can do can you give him a call back

## 2023-11-13 ENCOUNTER — CONSULT (OUTPATIENT)
Dept: SURGERY | Facility: CLINIC | Age: 68
End: 2023-11-13
Payer: MEDICARE

## 2023-11-13 VITALS
BODY MASS INDEX: 25.74 KG/M2 | HEIGHT: 67 IN | DIASTOLIC BLOOD PRESSURE: 74 MMHG | WEIGHT: 164 LBS | HEART RATE: 103 BPM | SYSTOLIC BLOOD PRESSURE: 117 MMHG

## 2023-11-13 DIAGNOSIS — K42.9 UMBILICAL HERNIA WITHOUT OBSTRUCTION AND WITHOUT GANGRENE: ICD-10-CM

## 2023-11-13 DIAGNOSIS — K40.90 INGUINAL HERNIA WITHOUT OBSTRUCTION OR GANGRENE: Primary | ICD-10-CM

## 2023-11-13 PROCEDURE — 99203 OFFICE O/P NEW LOW 30 MIN: CPT | Performed by: SURGERY

## 2023-11-13 NOTE — PROGRESS NOTES
Assessment/Plan: Patient presents with periumbilical discomfort. He was diagnosed with a small umbilical hernia. Indeed a small hernia is present. He also mentions that he has left lower quadrant discomfort for many months. Examination of the inguinal region reveals a left inguinal herniation. No evidence for right inguinal herniation. I recommended both left inguinal and umbilical hernia repairs. He is agreeable. He is planning to complete this in the new year. All questions answered. Diagnoses and all orders for this visit:    Umbilical hernia without obstruction and without gangrene  -     Ambulatory Referral to General Surgery        Subjective:      Patient ID: Juan C Tyler is a 75 y.o. male. Patient presents for umbilical hernia consult. States he has had discomfort at his umbilicus for one year. Denies bulge. Does not limit his activities. The following portions of the patient's history were reviewed and updated as appropriate:     He  has a past medical history of Anxiety, Depression, Hyperlipidemia, Kidney stone, Polymyalgia rheumatica syndrome ([address]), and Psychiatric disorder. He  has a past surgical history that includes Appendectomy; Tonsillectomy; Kidney stone surgery; Transurethral resection of prostate; [place] tooth extraction; and Colonoscopy. His family history includes Cancer in his father and mother; Diabetes in his mother; Heart attack in his father; Heart disease in his father; Prostate cancer in his father. He  reports that he has never smoked. He has never used smokeless tobacco. He reports that he does not drink alcohol and does not use drugs.   Current Outpatient Medications   Medication Sig Dispense Refill    clomiPRAMINE (ANAFRANIL) 25 mg capsule Take 25 mg by mouth daily at bedtime      clonazePAM (KlonoPIN) 0.5 mg tablet Take 0.25 mg by mouth 2 (two) times a day as needed for seizures 1/2 in AM 1/2 in PM and another 1/2 if needed      dicyclomine (BENTYL) 10 mg capsule Take 1 capsule (10 mg total) by mouth 2 (two) times a day (Patient not taking: Reported on 9/27/2023) 20 capsule 0    folic acid (FOLVITE) 1 mg tablet Take 1 mg by mouth daily      methotrexate 2.5 MG tablet Take by mouth 5 tablets on Sundays       pantoprazole (PROTONIX) 40 mg tablet Take 1 tablet (40 mg total) by mouth daily before breakfast 30 tablet 0    QUEtiapine (SEROquel) 50 mg tablet Take 50 mg by mouth daily at bedtime      simvastatin (ZOCOR) 10 mg tablet Take 1 tablet (10 mg total) by mouth daily at bedtime 90 tablet 1    vilazodone (VIIBRYD) 20 mg tablet 40 mg daily with breakfast       No current facility-administered medications for this visit. He is allergic to tamsulosin. .    Review of Systems   Constitutional: Negative. Negative for activity change. HENT: Negative. Eyes: Negative. Respiratory: Negative. Cardiovascular: Negative. Gastrointestinal:  Positive for abdominal pain. Endocrine: Negative. Genitourinary: Negative. Musculoskeletal: Negative. Skin: Negative. Allergic/Immunologic: Negative. Neurological: Negative. Psychiatric/Behavioral:  Negative for agitation, behavioral problems and confusion. The patient is not nervous/anxious. Objective:      /74   Pulse 103   Ht 5' 7" (1.702 m)   Wt 74.4 kg (164 lb)   BMI 25.69 kg/m²          Physical Exam  Constitutional:       Appearance: He is well-developed. He is not diaphoretic. HENT:      Head: Normocephalic and atraumatic. Eyes:      General: No scleral icterus. Right eye: No discharge. Left eye: No discharge. Conjunctiva/sclera: Conjunctivae normal.   Neck:      Thyroid: No thyromegaly. Trachea: No tracheal deviation. Cardiovascular:      Rate and Rhythm: Normal rate and regular rhythm. Heart sounds: No murmur heard. No friction rub. Pulmonary:      Effort: Pulmonary effort is normal. No respiratory distress.       Breath sounds: Normal breath sounds. No stridor. No wheezing. Chest:      Chest wall: No tenderness. Abdominal:      General: There is no distension. Palpations: There is no mass. Tenderness: There is no abdominal tenderness. There is no guarding or rebound. Hernia: A hernia (Left inguinal and umbilical herniations.) is present. Musculoskeletal:         General: No tenderness. Right lower leg: No edema. Left lower leg: No edema. Lymphadenopathy:      Cervical: No cervical adenopathy. Skin:     General: Skin is warm and dry. Findings: No erythema or rash. Neurological:      Mental Status: He is alert and oriented to person, place, and time. Cranial Nerves: No cranial nerve deficit.       Coordination: Coordination normal.   Psychiatric:         Behavior: Behavior normal.         Judgment: Judgment normal.

## 2023-11-15 ENCOUNTER — RA CDI HCC (OUTPATIENT)
Dept: OTHER | Facility: HOSPITAL | Age: 68
End: 2023-11-15

## 2023-11-22 ENCOUNTER — OFFICE VISIT (OUTPATIENT)
Dept: INTERNAL MEDICINE CLINIC | Facility: CLINIC | Age: 68
End: 2023-11-22
Payer: MEDICARE

## 2023-11-22 VITALS
HEART RATE: 90 BPM | TEMPERATURE: 97.9 F | HEIGHT: 66 IN | WEIGHT: 167 LBS | SYSTOLIC BLOOD PRESSURE: 116 MMHG | DIASTOLIC BLOOD PRESSURE: 70 MMHG | OXYGEN SATURATION: 98 % | BODY MASS INDEX: 26.84 KG/M2

## 2023-11-22 DIAGNOSIS — F42.8 OTHER OBSESSIVE-COMPULSIVE DISORDERS: ICD-10-CM

## 2023-11-22 DIAGNOSIS — J01.00 ACUTE MAXILLARY SINUSITIS, RECURRENCE NOT SPECIFIED: Primary | ICD-10-CM

## 2023-11-22 DIAGNOSIS — K21.9 GASTROESOPHAGEAL REFLUX DISEASE WITHOUT ESOPHAGITIS: ICD-10-CM

## 2023-11-22 DIAGNOSIS — E78.2 MIXED HYPERLIPIDEMIA: ICD-10-CM

## 2023-11-22 DIAGNOSIS — R73.01 IMPAIRED FASTING BLOOD SUGAR: ICD-10-CM

## 2023-11-22 PROCEDURE — 99214 OFFICE O/P EST MOD 30 MIN: CPT | Performed by: INTERNAL MEDICINE

## 2023-11-22 RX ORDER — AMOXICILLIN AND CLAVULANATE POTASSIUM 875; 125 MG/1; MG/1
1 TABLET, FILM COATED ORAL EVERY 12 HOURS SCHEDULED
Qty: 14 TABLET | Refills: 0 | Status: SHIPPED | OUTPATIENT
Start: 2023-11-22 | End: 2023-11-29

## 2023-11-22 RX ORDER — PANTOPRAZOLE SODIUM 40 MG/1
40 TABLET, DELAYED RELEASE ORAL
Qty: 30 TABLET | Refills: 0 | Status: SHIPPED | OUTPATIENT
Start: 2023-11-22 | End: 2024-05-20

## 2023-11-22 NOTE — PROGRESS NOTES
Assessment/Plan:    BMI Counseling: Body mass index is 26.95 kg/m². The BMI is above normal. Nutrition recommendations include decreasing portion sizes, encouraging healthy choices of fruits and vegetables and decreasing fast food intake. Exercise recommendations include moderate physical activity 150 minutes/week. Rationale for BMI follow-up plan is due to patient being overweight or obese. 1. Acute maxillary sinusitis, recurrence not specified  -     amoxicillin-clavulanate (AUGMENTIN) 875-125 mg per tablet; Take 1 tablet by mouth every 12 (twelve) hours for 7 days    2. Other obsessive-compulsive disorders    3. Mixed hyperlipidemia    4. Impaired fasting blood sugar    5. Gastroesophageal reflux disease without esophagitis           Subjective:      Patient ID: Alexandrea Vega is a 75 y.o. male. Cough, clear sputum, sore throat, follow-up on the other medical issues to ensure they are stable on current medication, doing well, been to surgeon for the hernia,    Cough  Associated symptoms include headaches and a sore throat. Pertinent negatives include no chest pain, chills, ear pain, fever, rash or shortness of breath. Headache  Sore Throat   Associated symptoms include coughing and headaches. Pertinent negatives include no abdominal pain, congestion, ear pain, shortness of breath or vomiting. The following portions of the patient's history were reviewed and updated as appropriate: He  has a past medical history of Anxiety, Depression, Hyperlipidemia, Kidney stone, Polymyalgia rheumatica syndrome ([address]), and Psychiatric disorder.   He   Patient Active Problem List    Diagnosis Date Noted    Inguinal hernia without obstruction or gangrene 14/88/1428    Umbilical hernia without obstruction and without gangrene 11/03/2023    Gastroesophageal reflux disease without esophagitis 11/03/2023    Depression, recurrent ([address]) 05/23/2023    Polymyalgia rheumatica syndrome ([address]) 05/18/2022 Impaired fasting blood sugar 05/18/2022    Other chest pain 11/17/2020    Pain in left knee 05/23/2018    Left facial numbness 12/18/2016    History of temporal arteritis 12/18/2016    HTN (hypertension) 12/18/2016    Mixed hyperlipidemia 12/18/2016    Acute nonintractable headache 12/18/2016    Obsessive compulsive disorder 12/18/2016    Anxiety 12/18/2016    ADH disorder 12/18/2016     He  has a past surgical history that includes Appendectomy; Tonsillectomy; Kidney stone surgery; Transurethral resection of prostate; [place] tooth extraction; and Colonoscopy. His family history includes Cancer in his father and mother; Diabetes in his mother; Heart attack in his father; Heart disease in his father; Prostate cancer in his father. He  reports that he has never smoked. He has never used smokeless tobacco. He reports that he does not drink alcohol and does not use drugs. Current Outpatient Medications   Medication Sig Dispense Refill    amoxicillin-clavulanate (AUGMENTIN) 875-125 mg per tablet Take 1 tablet by mouth every 12 (twelve) hours for 7 days 14 tablet 0    clomiPRAMINE (ANAFRANIL) 25 mg capsule Take 25 mg by mouth daily at bedtime      clonazePAM (KlonoPIN) 0.5 mg tablet Take 0.25 mg by mouth 2 (two) times a day as needed for seizures 1/2 in AM 1/2 in PM and another 1/2 if needed      folic acid (FOLVITE) 1 mg tablet Take 1 mg by mouth daily      methotrexate 2.5 MG tablet Take by mouth 5 tablets on Sundays       pantoprazole (PROTONIX) 40 mg tablet Take 1 tablet (40 mg total) by mouth daily before breakfast 30 tablet 0    QUEtiapine (SEROquel) 50 mg tablet Take 50 mg by mouth daily at bedtime      simvastatin (ZOCOR) 10 mg tablet Take 1 tablet (10 mg total) by mouth daily at bedtime 90 tablet 1    vilazodone (VIIBRYD) 20 mg tablet 40 mg daily with breakfast       No current facility-administered medications for this visit.      Current Outpatient Medications on File Prior to Visit   Medication Sig clomiPRAMINE (ANAFRANIL) 25 mg capsule Take 25 mg by mouth daily at bedtime    clonazePAM (KlonoPIN) 0.5 mg tablet Take 0.25 mg by mouth 2 (two) times a day as needed for seizures 1/2 in AM 1/2 in PM and another 1/2 if needed    folic acid (FOLVITE) 1 mg tablet Take 1 mg by mouth daily    methotrexate 2.5 MG tablet Take by mouth 5 tablets on Sundays     pantoprazole (PROTONIX) 40 mg tablet Take 1 tablet (40 mg total) by mouth daily before breakfast    QUEtiapine (SEROquel) 50 mg tablet Take 50 mg by mouth daily at bedtime    simvastatin (ZOCOR) 10 mg tablet Take 1 tablet (10 mg total) by mouth daily at bedtime    vilazodone (VIIBRYD) 20 mg tablet 40 mg daily with breakfast     No current facility-administered medications on file prior to visit. He is allergic to tamsulosin. .    Review of Systems   Constitutional:  Negative for chills and fever. HENT:  Positive for sore throat. Negative for congestion and ear pain. Eyes:  Negative for pain. Respiratory:  Positive for cough. Negative for shortness of breath. Cardiovascular:  Negative for chest pain and leg swelling. Gastrointestinal:  Negative for abdominal pain, nausea and vomiting. Endocrine: Negative for polyuria. Genitourinary:  Negative for difficulty urinating, frequency and urgency. Musculoskeletal:  Negative for arthralgias and back pain. Skin:  Negative for rash. Neurological:  Positive for headaches. Negative for weakness. Psychiatric/Behavioral:  Negative for sleep disturbance. The patient is not nervous/anxious.           Objective:      /70 (BP Location: Left arm, Patient Position: Sitting, Cuff Size: Standard)   Pulse 90   Temp 97.9 °F (36.6 °C) (Temporal)   Ht 5' 6" (1.676 m)   Wt 75.8 kg (167 lb)   SpO2 98%   BMI 26.95 kg/m²     Recent Results (from the past 1344 hour(s))   CBC and differential    Collection Time: 09/28/23 11:51 AM   Result Value Ref Range    WBC 5.33 4.31 - 10.16 Thousand/uL    RBC 4.89 3.88 - 5.62 Million/uL    Hemoglobin 15.2 12.0 - 17.0 g/dL    Hematocrit 44.6 36.5 - 49.3 %    MCV 91 82 - 98 fL    MCH 31.1 26.8 - 34.3 pg    MCHC 34.1 31.4 - 37.4 g/dL    RDW 13.2 11.6 - 15.1 %    MPV 9.6 8.9 - 12.7 fL    Platelets 883 690 - 051 Thousands/uL    nRBC 0 /100 WBCs    Neutrophils Relative 77 (H) 43 - 75 %    Immat GRANS % 0 0 - 2 %    Lymphocytes Relative 12 (L) 14 - 44 %    Monocytes Relative 7 4 - 12 %    Eosinophils Relative 3 0 - 6 %    Basophils Relative 1 0 - 1 %    Neutrophils Absolute 4.12 1.85 - 7.62 Thousands/µL    Immature Grans Absolute 0.01 0.00 - 0.20 Thousand/uL    Lymphocytes Absolute 0.64 0.60 - 4.47 Thousands/µL    Monocytes Absolute 0.39 0.17 - 1.22 Thousand/µL    Eosinophils Absolute 0.14 0.00 - 0.61 Thousand/µL    Basophils Absolute 0.03 0.00 - 0.10 Thousands/µL   Comprehensive metabolic panel    Collection Time: 09/28/23 11:51 AM   Result Value Ref Range    Sodium 136 135 - 147 mmol/L    Potassium 4.2 3.5 - 5.3 mmol/L    Chloride 102 96 - 108 mmol/L    CO2 29 21 - 32 mmol/L    ANION GAP 5 mmol/L    BUN 14 5 - 25 mg/dL    Creatinine 1.06 0.60 - 1.30 mg/dL    Glucose, Fasting 123 (H) 65 - 99 mg/dL    Calcium 9.3 8.4 - 10.2 mg/dL    AST 19 13 - 39 U/L    ALT 25 7 - 52 U/L    Alkaline Phosphatase 90 34 - 104 U/L    Total Protein 6.6 6.4 - 8.4 g/dL    Albumin 4.3 3.5 - 5.0 g/dL    Total Bilirubin 0.60 0.20 - 1.00 mg/dL    eGFR 71 ml/min/1.73sq m   C-reactive protein    Collection Time: 09/28/23 11:51 AM   Result Value Ref Range    CRP 1.5 <3.0 mg/L   Sedimentation rate, automated    Collection Time: 09/28/23 11:51 AM   Result Value Ref Range    Sed Rate 2 0 - 19 mm/hour        Physical Exam  Constitutional:       Appearance: Normal appearance. HENT:      Head: Normocephalic. Right Ear: Tympanic membrane, ear canal and external ear normal.      Left Ear: Tympanic membrane, ear canal and external ear normal.      Nose: Nose normal. No congestion.       Mouth/Throat:      Mouth: Mucous membranes are moist.      Pharynx: Oropharynx is clear. No oropharyngeal exudate or posterior oropharyngeal erythema. Eyes:      Extraocular Movements: Extraocular movements intact. Conjunctiva/sclera: Conjunctivae normal.   Cardiovascular:      Rate and Rhythm: Normal rate and regular rhythm. Heart sounds: Normal heart sounds. No murmur heard. Pulmonary:      Effort: Pulmonary effort is normal.      Breath sounds: Normal breath sounds. No wheezing or rales. Abdominal:      General: Abdomen is flat. There is no distension. Palpations: Abdomen is soft. Tenderness: There is no abdominal tenderness. Musculoskeletal:         General: Normal range of motion. Cervical back: Normal range of motion and neck supple. Right lower leg: No edema. Left lower leg: No edema. Lymphadenopathy:      Cervical: No cervical adenopathy. Skin:     General: Skin is warm. Neurological:      General: No focal deficit present. Mental Status: He is alert and oriented to person, place, and time.

## 2023-12-13 ENCOUNTER — TELEPHONE (OUTPATIENT)
Dept: INTERNAL MEDICINE CLINIC | Facility: CLINIC | Age: 68
End: 2023-12-13

## 2023-12-13 NOTE — TELEPHONE ENCOUNTER
Patient called and wants to go over if he should be going back on his simvastatin 10 mg or not .  You can call he to discus

## 2023-12-21 ENCOUNTER — TELEPHONE (OUTPATIENT)
Dept: INTERNAL MEDICINE CLINIC | Facility: CLINIC | Age: 68
End: 2023-12-21

## 2023-12-21 NOTE — TELEPHONE ENCOUNTER
Patient called in and stated dr roland has him on simvastatin  10 mg and pantoprazole 40 mg and is having some pain , he has stopped taking the medicine and would like a call to discus

## 2023-12-21 NOTE — TELEPHONE ENCOUNTER
Spoke to the patient, okay to hold off on simvastatin and Protonix now that his symptoms have been resolved.  Discussed with Dr. Smith in the upcoming office visit.  Recommend to continue methotrexate

## 2023-12-29 ENCOUNTER — RA CDI HCC (OUTPATIENT)
Dept: OTHER | Facility: HOSPITAL | Age: 68
End: 2023-12-29

## 2024-01-05 ENCOUNTER — OFFICE VISIT (OUTPATIENT)
Dept: INTERNAL MEDICINE CLINIC | Facility: CLINIC | Age: 69
End: 2024-01-05
Payer: MEDICARE

## 2024-01-05 VITALS
HEART RATE: 92 BPM | TEMPERATURE: 98 F | DIASTOLIC BLOOD PRESSURE: 72 MMHG | HEIGHT: 67 IN | SYSTOLIC BLOOD PRESSURE: 122 MMHG | OXYGEN SATURATION: 99 % | BODY MASS INDEX: 25.43 KG/M2 | WEIGHT: 162 LBS

## 2024-01-05 DIAGNOSIS — F42.8 OTHER OBSESSIVE-COMPULSIVE DISORDERS: ICD-10-CM

## 2024-01-05 DIAGNOSIS — M35.3 POLYMYALGIA RHEUMATICA SYNDROME (HCC): ICD-10-CM

## 2024-01-05 DIAGNOSIS — E78.2 MIXED HYPERLIPIDEMIA: ICD-10-CM

## 2024-01-05 DIAGNOSIS — E22.2 SYNDROME OF INAPPROPRIATE SECRETION OF ANTIDIURETIC HORMONE (HCC): Chronic | ICD-10-CM

## 2024-01-05 DIAGNOSIS — R73.01 IMPAIRED FASTING BLOOD SUGAR: ICD-10-CM

## 2024-01-05 DIAGNOSIS — K21.9 GASTROESOPHAGEAL REFLUX DISEASE WITHOUT ESOPHAGITIS: Primary | ICD-10-CM

## 2024-01-05 PROCEDURE — 99214 OFFICE O/P EST MOD 30 MIN: CPT | Performed by: INTERNAL MEDICINE

## 2024-01-05 NOTE — PROGRESS NOTES
Assessment/Plan:             1. Gastroesophageal reflux disease without esophagitis  Comments:  continue  pantoprazole    2. Impaired fasting blood sugar    3. Polymyalgia rheumatica syndrome (HCC)  Comments:  continue methotrexate,folic acid    4. Mixed hyperlipidemia    5. Other obsessive-compulsive disorders    6. ADH disorder           Subjective:      Patient ID: Juan Lucero is a 68 y.o. male.    Follow-up on multiple medical problems to ensure they are stable on current medication, stomach acid is acting up, anxious lately,    Nausea  Associated symptoms include nausea. Pertinent negatives include no abdominal pain, arthralgias, chest pain, chills, congestion, coughing, fever, headaches, rash, sore throat, vomiting or weakness.       The following portions of the patient's history were reviewed and updated as appropriate: He  has a past medical history of Anxiety, Depression, Hyperlipidemia, Kidney stone, Polymyalgia rheumatica syndrome (HCC), and Psychiatric disorder.  He   Patient Active Problem List    Diagnosis Date Noted    Inguinal hernia without obstruction or gangrene 11/13/2023    Umbilical hernia without obstruction and without gangrene 11/03/2023    Gastroesophageal reflux disease without esophagitis 11/03/2023    Depression, recurrent (HCC) 05/23/2023    Polymyalgia rheumatica syndrome (HCC) 05/18/2022    Impaired fasting blood sugar 05/18/2022    Other chest pain 11/17/2020    Pain in left knee 05/23/2018    Left facial numbness 12/18/2016    History of temporal arteritis 12/18/2016    HTN (hypertension) 12/18/2016    Mixed hyperlipidemia 12/18/2016    Acute nonintractable headache 12/18/2016    Obsessive compulsive disorder 12/18/2016    Anxiety 12/18/2016    ADH disorder 12/18/2016     He  has a past surgical history that includes Appendectomy; Tonsillectomy; Kidney stone surgery; Transurethral resection of prostate; [place] tooth extraction; and Colonoscopy.  His family history  includes Cancer in his father and mother; Diabetes in his mother; Heart attack in his father; Heart disease in his father; Prostate cancer in his father.  He  reports that he has never smoked. He has never used smokeless tobacco. He reports that he does not drink alcohol and does not use drugs.  Current Outpatient Medications   Medication Sig Dispense Refill    clomiPRAMINE (ANAFRANIL) 25 mg capsule Take 25 mg by mouth daily at bedtime      clonazePAM (KlonoPIN) 0.5 mg tablet Take 0.5 mg by mouth daily at bedtime .5 mg once at night      folic acid (FOLVITE) 1 mg tablet Take 1 mg by mouth daily      methotrexate 2.5 MG tablet Take by mouth 5 tablets on Sundays       QUEtiapine (SEROquel) 50 mg tablet Take 50 mg by mouth daily at bedtime      vilazodone (VIIBRYD) 20 mg tablet 40 mg daily with breakfast      pantoprazole (PROTONIX) 40 mg tablet Take 1 tablet (40 mg total) by mouth daily before breakfast (Patient not taking: Reported on 1/5/2024) 30 tablet 0    simvastatin (ZOCOR) 10 mg tablet Take 1 tablet (10 mg total) by mouth daily at bedtime (Patient not taking: Reported on 1/5/2024) 90 tablet 1     No current facility-administered medications for this visit.     Current Outpatient Medications on File Prior to Visit   Medication Sig    clomiPRAMINE (ANAFRANIL) 25 mg capsule Take 25 mg by mouth daily at bedtime    clonazePAM (KlonoPIN) 0.5 mg tablet Take 0.5 mg by mouth daily at bedtime .5 mg once at night    folic acid (FOLVITE) 1 mg tablet Take 1 mg by mouth daily    methotrexate 2.5 MG tablet Take by mouth 5 tablets on Sundays     QUEtiapine (SEROquel) 50 mg tablet Take 50 mg by mouth daily at bedtime    vilazodone (VIIBRYD) 20 mg tablet 40 mg daily with breakfast    pantoprazole (PROTONIX) 40 mg tablet Take 1 tablet (40 mg total) by mouth daily before breakfast (Patient not taking: Reported on 1/5/2024)    simvastatin (ZOCOR) 10 mg tablet Take 1 tablet (10 mg total) by mouth daily at bedtime (Patient not  "taking: Reported on 1/5/2024)     No current facility-administered medications on file prior to visit.     He is allergic to tamsulosin..    Review of Systems   Constitutional:  Negative for chills and fever.   HENT:  Negative for congestion, ear pain and sore throat.    Eyes:  Negative for pain.   Respiratory:  Negative for cough and shortness of breath.    Cardiovascular:  Negative for chest pain and leg swelling.   Gastrointestinal:  Positive for nausea. Negative for abdominal pain and vomiting.   Endocrine: Negative for polyuria.   Genitourinary:  Negative for difficulty urinating, frequency and urgency.   Musculoskeletal:  Negative for arthralgias and back pain.   Skin:  Negative for rash.   Neurological:  Negative for weakness and headaches.   Psychiatric/Behavioral:  Negative for sleep disturbance. The patient is not nervous/anxious.          Objective:      /72 (BP Location: Left arm, Patient Position: Sitting, Cuff Size: Standard)   Pulse 92   Temp 98 °F (36.7 °C) (Temporal)   Ht 5' 7\" (1.702 m)   Wt 73.5 kg (162 lb)   SpO2 99%   BMI 25.37 kg/m²     No results found for this or any previous visit (from the past 1344 hour(s)).     Physical Exam  Constitutional:       Appearance: Normal appearance.   HENT:      Head: Normocephalic.      Right Ear: External ear normal.      Left Ear: External ear normal.      Nose: Nose normal. No congestion.      Mouth/Throat:      Mouth: Mucous membranes are moist.      Pharynx: Oropharynx is clear. No oropharyngeal exudate or posterior oropharyngeal erythema.   Eyes:      Extraocular Movements: Extraocular movements intact.      Conjunctiva/sclera: Conjunctivae normal.   Cardiovascular:      Rate and Rhythm: Normal rate and regular rhythm.      Heart sounds: Normal heart sounds. No murmur heard.  Pulmonary:      Effort: Pulmonary effort is normal.      Breath sounds: Normal breath sounds. No wheezing or rales.   Abdominal:      General: There is no distension. "      Palpations: Abdomen is soft.      Tenderness: There is no abdominal tenderness.   Musculoskeletal:         General: Normal range of motion.      Cervical back: Normal range of motion and neck supple.      Right lower leg: No edema.      Left lower leg: No edema.   Lymphadenopathy:      Cervical: No cervical adenopathy.   Skin:     General: Skin is warm.   Neurological:      General: No focal deficit present.      Mental Status: He is alert and oriented to person, place, and time.

## 2024-01-12 ENCOUNTER — TELEPHONE (OUTPATIENT)
Dept: INTERNAL MEDICINE CLINIC | Facility: CLINIC | Age: 69
End: 2024-01-12

## 2024-01-12 NOTE — TELEPHONE ENCOUNTER
Patient left a message asking to speak with you regarding medication that was recently prescribed to him. He stated that you asked him to call and speak with you personally.     # [phone]

## 2024-01-15 ENCOUNTER — TELEPHONE (OUTPATIENT)
Dept: INTERNAL MEDICINE CLINIC | Facility: CLINIC | Age: 69
End: 2024-01-15

## 2024-01-15 DIAGNOSIS — K21.9 GASTROESOPHAGEAL REFLUX DISEASE WITHOUT ESOPHAGITIS: ICD-10-CM

## 2024-01-15 RX ORDER — FAMOTIDINE 20 MG/1
20 TABLET, FILM COATED ORAL 2 TIMES DAILY
Qty: 60 TABLET | Refills: 0 | Status: SHIPPED | OUTPATIENT
Start: 2024-01-15 | End: 2025-01-09

## 2024-01-15 RX ORDER — PANTOPRAZOLE SODIUM 40 MG/1
40 TABLET, DELAYED RELEASE ORAL
Qty: 30 TABLET | Refills: 0 | Status: SHIPPED | OUTPATIENT
Start: 2024-01-15 | End: 2024-07-13

## 2024-01-25 ENCOUNTER — TELEPHONE (OUTPATIENT)
Dept: INTERNAL MEDICINE CLINIC | Facility: CLINIC | Age: 69
End: 2024-01-25

## 2024-01-25 NOTE — TELEPHONE ENCOUNTER
Patient is requesting you to call him, he would only tell  me it was for medication. Patient said there is no rush.       Call back number [phone]

## 2024-01-26 ENCOUNTER — RA CDI HCC (OUTPATIENT)
Dept: OTHER | Facility: HOSPITAL | Age: 69
End: 2024-01-26

## 2024-01-30 ENCOUNTER — PREP FOR PROCEDURE (OUTPATIENT)
Dept: SURGERY | Facility: CLINIC | Age: 69
End: 2024-01-30

## 2024-01-30 ENCOUNTER — OFFICE VISIT (OUTPATIENT)
Dept: SURGERY | Facility: CLINIC | Age: 69
End: 2024-01-30
Payer: MEDICARE

## 2024-01-30 VITALS
HEIGHT: 67 IN | HEART RATE: 118 BPM | WEIGHT: 164 LBS | DIASTOLIC BLOOD PRESSURE: 65 MMHG | BODY MASS INDEX: 25.74 KG/M2 | SYSTOLIC BLOOD PRESSURE: 92 MMHG

## 2024-01-30 DIAGNOSIS — K40.90 INGUINAL HERNIA: Primary | ICD-10-CM

## 2024-01-30 DIAGNOSIS — K40.90 INGUINAL HERNIA WITHOUT OBSTRUCTION OR GANGRENE: Primary | ICD-10-CM

## 2024-01-30 DIAGNOSIS — K42.9 UMBILICAL HERNIA WITHOUT OBSTRUCTION AND WITHOUT GANGRENE: ICD-10-CM

## 2024-01-30 DIAGNOSIS — K42.9 UMBILICAL HERNIA: ICD-10-CM

## 2024-01-30 PROCEDURE — 99213 OFFICE O/P EST LOW 20 MIN: CPT | Performed by: SURGERY

## 2024-01-30 RX ORDER — TAMSULOSIN HYDROCHLORIDE 0.4 MG/1
0.4 CAPSULE ORAL
Qty: 10 CAPSULE | Refills: 0 | Status: SHIPPED | OUTPATIENT
Start: 2024-01-30

## 2024-01-30 NOTE — H&P (VIEW-ONLY)
Assessment/Plan: Patient returns for evaluation of left inguinal and umbilical hernia.  Desires to undergo repair.  He has mild discomfort over these regions.  Operative repair is suggested.  Risks and benefits explained.  Preoperative Flomax prescribed.    There are no diagnoses linked to this encounter.    Subjective:      Patient ID: Juan Lucero is a 68 y.o. male.    Patient presents for follow up on left inguinal hernia and umbilical hernia to discuss repair.        The following portions of the patient's history were reviewed and updated as appropriate:     He  has a past medical history of Anxiety, Depression, Hyperlipidemia, Kidney stone, Polymyalgia rheumatica syndrome (HCC), and Psychiatric disorder.  He  has a past surgical history that includes Appendectomy; Tonsillectomy; Kidney stone surgery; Transurethral resection of prostate; [place] tooth extraction; and Colonoscopy.  His family history includes Cancer in his father and mother; Diabetes in his mother; Heart attack in his father; Heart disease in his father; Prostate cancer in his father.  He  reports that he has never smoked. He has never used smokeless tobacco. He reports that he does not drink alcohol and does not use drugs.  Current Outpatient Medications   Medication Sig Dispense Refill    clomiPRAMINE (ANAFRANIL) 25 mg capsule Take 25 mg by mouth daily at bedtime      clonazePAM (KlonoPIN) 0.5 mg tablet Take 0.5 mg by mouth daily at bedtime .5 mg once at night      famotidine (PEPCID) 20 mg tablet Take 1 tablet (20 mg total) by mouth 2 (two) times a day 60 tablet 0    folic acid (FOLVITE) 1 mg tablet Take 1 mg by mouth daily      methotrexate 2.5 MG tablet Take by mouth 5 tablets on Sundays       pantoprazole (PROTONIX) 40 mg tablet Take 1 tablet (40 mg total) by mouth daily before breakfast 30 tablet 0    QUEtiapine (SEROquel) 50 mg tablet Take 50 mg by mouth daily at bedtime      simvastatin (ZOCOR) 10 mg tablet Take 1 tablet (10 mg  "total) by mouth daily at bedtime (Patient not taking: Reported on 1/5/2024) 90 tablet 1    vilazodone (VIIBRYD) 20 mg tablet 40 mg daily with breakfast       No current facility-administered medications for this visit.     He is allergic to tamsulosin..    Review of Systems   Constitutional: Negative.  Negative for activity change.   HENT: Negative.     Eyes: Negative.    Respiratory: Negative.     Cardiovascular: Negative.    Gastrointestinal:  Positive for abdominal pain.   Endocrine: Negative.    Genitourinary: Negative.    Musculoskeletal: Negative.    Skin: Negative.    Allergic/Immunologic: Negative.    Neurological: Negative.    Psychiatric/Behavioral:  Negative for agitation, behavioral problems and confusion. The patient is not nervous/anxious.          Objective:      BP 92/65   Pulse (!) 118   Ht 5' 7\" (1.702 m)   Wt 74.4 kg (164 lb)   BMI 25.69 kg/m²          Physical Exam  Constitutional:       Appearance: He is well-developed.   HENT:      Head: Normocephalic and atraumatic.   Eyes:      Extraocular Movements: Extraocular movements intact.      Conjunctiva/sclera: Conjunctivae normal.   Neck:      Thyroid: No thyromegaly.      Trachea: No tracheal deviation.   Cardiovascular:      Rate and Rhythm: Normal rate and regular rhythm.      Heart sounds: Normal heart sounds.   Pulmonary:      Effort: Pulmonary effort is normal.      Breath sounds: Normal breath sounds.   Abdominal:      General: There is no distension.      Palpations: There is no mass.      Tenderness: There is no abdominal tenderness. There is no guarding or rebound.      Hernia: A hernia (Left inguinal and umbilical hernia.  These are reducible.) is present.   Skin:     General: Skin is warm and dry.   Neurological:      Mental Status: He is alert and oriented to person, place, and time.      Cranial Nerves: No cranial nerve deficit.      Coordination: Coordination normal.   Psychiatric:         Behavior: Behavior normal.         " Judgment: Judgment normal.

## 2024-01-30 NOTE — PROGRESS NOTES
Assessment/Plan: Patient returns for evaluation of left inguinal and umbilical hernia.  Desires to undergo repair.  He has mild discomfort over these regions.  Operative repair is suggested.  Risks and benefits explained.  Preoperative Flomax prescribed.    There are no diagnoses linked to this encounter.    Subjective:      Patient ID: Juan Lucero is a 68 y.o. male.    Patient presents for follow up on left inguinal hernia and umbilical hernia to discuss repair.        The following portions of the patient's history were reviewed and updated as appropriate:     He  has a past medical history of Anxiety, Depression, Hyperlipidemia, Kidney stone, Polymyalgia rheumatica syndrome (HCC), and Psychiatric disorder.  He  has a past surgical history that includes Appendectomy; Tonsillectomy; Kidney stone surgery; Transurethral resection of prostate; [place] tooth extraction; and Colonoscopy.  His family history includes Cancer in his father and mother; Diabetes in his mother; Heart attack in his father; Heart disease in his father; Prostate cancer in his father.  He  reports that he has never smoked. He has never used smokeless tobacco. He reports that he does not drink alcohol and does not use drugs.  Current Outpatient Medications   Medication Sig Dispense Refill    clomiPRAMINE (ANAFRANIL) 25 mg capsule Take 25 mg by mouth daily at bedtime      clonazePAM (KlonoPIN) 0.5 mg tablet Take 0.5 mg by mouth daily at bedtime .5 mg once at night      famotidine (PEPCID) 20 mg tablet Take 1 tablet (20 mg total) by mouth 2 (two) times a day 60 tablet 0    folic acid (FOLVITE) 1 mg tablet Take 1 mg by mouth daily      methotrexate 2.5 MG tablet Take by mouth 5 tablets on Sundays       pantoprazole (PROTONIX) 40 mg tablet Take 1 tablet (40 mg total) by mouth daily before breakfast 30 tablet 0    QUEtiapine (SEROquel) 50 mg tablet Take 50 mg by mouth daily at bedtime      simvastatin (ZOCOR) 10 mg tablet Take 1 tablet (10 mg  "total) by mouth daily at bedtime (Patient not taking: Reported on 1/5/2024) 90 tablet 1    vilazodone (VIIBRYD) 20 mg tablet 40 mg daily with breakfast       No current facility-administered medications for this visit.     He is allergic to tamsulosin..    Review of Systems   Constitutional: Negative.  Negative for activity change.   HENT: Negative.     Eyes: Negative.    Respiratory: Negative.     Cardiovascular: Negative.    Gastrointestinal:  Positive for abdominal pain.   Endocrine: Negative.    Genitourinary: Negative.    Musculoskeletal: Negative.    Skin: Negative.    Allergic/Immunologic: Negative.    Neurological: Negative.    Psychiatric/Behavioral:  Negative for agitation, behavioral problems and confusion. The patient is not nervous/anxious.          Objective:      BP 92/65   Pulse (!) 118   Ht 5' 7\" (1.702 m)   Wt 74.4 kg (164 lb)   BMI 25.69 kg/m²          Physical Exam  Constitutional:       Appearance: He is well-developed.   HENT:      Head: Normocephalic and atraumatic.   Eyes:      Extraocular Movements: Extraocular movements intact.      Conjunctiva/sclera: Conjunctivae normal.   Neck:      Thyroid: No thyromegaly.      Trachea: No tracheal deviation.   Cardiovascular:      Rate and Rhythm: Normal rate and regular rhythm.      Heart sounds: Normal heart sounds.   Pulmonary:      Effort: Pulmonary effort is normal.      Breath sounds: Normal breath sounds.   Abdominal:      General: There is no distension.      Palpations: There is no mass.      Tenderness: There is no abdominal tenderness. There is no guarding or rebound.      Hernia: A hernia (Left inguinal and umbilical hernia.  These are reducible.) is present.   Skin:     General: Skin is warm and dry.   Neurological:      Mental Status: He is alert and oriented to person, place, and time.      Cranial Nerves: No cranial nerve deficit.      Coordination: Coordination normal.   Psychiatric:         Behavior: Behavior normal.         " Judgment: Judgment normal.

## 2024-02-01 ENCOUNTER — APPOINTMENT (OUTPATIENT)
Dept: LAB | Facility: CLINIC | Age: 69
End: 2024-02-01
Payer: MEDICARE

## 2024-02-01 DIAGNOSIS — K42.9 UMBILICAL HERNIA: ICD-10-CM

## 2024-02-01 DIAGNOSIS — K40.90 INGUINAL HERNIA: ICD-10-CM

## 2024-02-01 LAB
ALBUMIN SERPL BCP-MCNC: 4.2 G/DL (ref 3.5–5)
ALP SERPL-CCNC: 86 U/L (ref 34–104)
ALT SERPL W P-5'-P-CCNC: 15 U/L (ref 7–52)
ANION GAP SERPL CALCULATED.3IONS-SCNC: 6 MMOL/L
AST SERPL W P-5'-P-CCNC: 16 U/L (ref 13–39)
ATRIAL RATE: 86 BPM
BILIRUB SERPL-MCNC: 0.78 MG/DL (ref 0.2–1)
BUN SERPL-MCNC: 16 MG/DL (ref 5–25)
CALCIUM SERPL-MCNC: 9.2 MG/DL (ref 8.4–10.2)
CHLORIDE SERPL-SCNC: 104 MMOL/L (ref 96–108)
CO2 SERPL-SCNC: 29 MMOL/L (ref 21–32)
CREAT SERPL-MCNC: 1.15 MG/DL (ref 0.6–1.3)
ERYTHROCYTE [DISTWIDTH] IN BLOOD BY AUTOMATED COUNT: 12.5 % (ref 11.6–15.1)
GFR SERPL CREATININE-BSD FRML MDRD: 64 ML/MIN/1.73SQ M
GLUCOSE P FAST SERPL-MCNC: 170 MG/DL (ref 65–99)
HCT VFR BLD AUTO: 45.7 % (ref 36.5–49.3)
HGB BLD-MCNC: 15.1 G/DL (ref 12–17)
MCH RBC QN AUTO: 29.7 PG (ref 26.8–34.3)
MCHC RBC AUTO-ENTMCNC: 33 G/DL (ref 31.4–37.4)
MCV RBC AUTO: 90 FL (ref 82–98)
P AXIS: 66 DEGREES
PLATELET # BLD AUTO: 255 THOUSANDS/UL (ref 149–390)
PMV BLD AUTO: 9.9 FL (ref 8.9–12.7)
POTASSIUM SERPL-SCNC: 3.7 MMOL/L (ref 3.5–5.3)
PR INTERVAL: 172 MS
PROT SERPL-MCNC: 6.4 G/DL (ref 6.4–8.4)
QRS AXIS: 25 DEGREES
QRSD INTERVAL: 90 MS
QT INTERVAL: 364 MS
QTC INTERVAL: 435 MS
RBC # BLD AUTO: 5.09 MILLION/UL (ref 3.88–5.62)
SODIUM SERPL-SCNC: 139 MMOL/L (ref 135–147)
T WAVE AXIS: 43 DEGREES
VENTRICULAR RATE: 86 BPM
WBC # BLD AUTO: 4.96 THOUSAND/UL (ref 4.31–10.16)

## 2024-02-01 PROCEDURE — 36415 COLL VENOUS BLD VENIPUNCTURE: CPT

## 2024-02-01 PROCEDURE — 80053 COMPREHEN METABOLIC PANEL: CPT

## 2024-02-01 PROCEDURE — 85027 COMPLETE CBC AUTOMATED: CPT

## 2024-02-02 ENCOUNTER — CONSULT (OUTPATIENT)
Dept: INTERNAL MEDICINE CLINIC | Facility: CLINIC | Age: 69
End: 2024-02-02
Payer: MEDICARE

## 2024-02-02 VITALS
HEIGHT: 67 IN | HEART RATE: 98 BPM | OXYGEN SATURATION: 95 % | SYSTOLIC BLOOD PRESSURE: 110 MMHG | BODY MASS INDEX: 25.74 KG/M2 | WEIGHT: 164 LBS | TEMPERATURE: 97.7 F | DIASTOLIC BLOOD PRESSURE: 68 MMHG

## 2024-02-02 DIAGNOSIS — F33.9 DEPRESSION, RECURRENT (HCC): ICD-10-CM

## 2024-02-02 DIAGNOSIS — E78.2 MIXED HYPERLIPIDEMIA: ICD-10-CM

## 2024-02-02 DIAGNOSIS — Z01.818 PREOP EXAM FOR INTERNAL MEDICINE: Primary | ICD-10-CM

## 2024-02-02 DIAGNOSIS — R73.01 IMPAIRED FASTING BLOOD SUGAR: ICD-10-CM

## 2024-02-02 DIAGNOSIS — K42.9 UMBILICAL HERNIA WITHOUT OBSTRUCTION AND WITHOUT GANGRENE: ICD-10-CM

## 2024-02-02 DIAGNOSIS — K40.90 LEFT INGUINAL HERNIA: ICD-10-CM

## 2024-02-02 DIAGNOSIS — M31.6 TEMPORAL ARTERITIS (HCC): ICD-10-CM

## 2024-02-02 PROCEDURE — 99214 OFFICE O/P EST MOD 30 MIN: CPT | Performed by: INTERNAL MEDICINE

## 2024-02-02 RX ORDER — VILAZODONE HYDROCHLORIDE 40 MG/1
40 TABLET ORAL
COMMUNITY
Start: 2024-01-25

## 2024-02-02 NOTE — PROGRESS NOTES
Assessment/Plan:             1. Preop exam for internal medicine  Comments:  cleared for surgery    2. Umbilical hernia without obstruction and without gangrene    3. Left inguinal hernia    4. Mixed hyperlipidemia    5. Impaired fasting blood sugar  Comments:  Advised him smaller meal portion more often  Orders:  -     Hemoglobin A1C; Future    6. Temporal arteritis (HCC)    7. Depression, recurrent (HCC)           Subjective:      Patient ID: Juan Lucero is a 68 y.o. male.    Preop for hernia surgery on 2/16/2024, test done yesterday, reviewed, sugar 170        The following portions of the patient's history were reviewed and updated as appropriate: He  has a past medical history of Anxiety, Depression, Hyperlipidemia, Kidney stone, Polymyalgia rheumatica syndrome (HCC), and Psychiatric disorder.  He   Patient Active Problem List    Diagnosis Date Noted    Temporal arteritis (HCC) 02/02/2024    Left inguinal hernia 11/13/2023    Umbilical hernia without obstruction and without gangrene 11/03/2023    Gastroesophageal reflux disease without esophagitis 11/03/2023    Depression, recurrent (HCC) 05/23/2023    Preop exam for internal medicine 07/20/2022    Polymyalgia rheumatica syndrome (HCC) 05/18/2022    Impaired fasting blood sugar 05/18/2022    Other chest pain 11/17/2020    Pain in left knee 05/23/2018    Left facial numbness 12/18/2016    History of temporal arteritis 12/18/2016    HTN (hypertension) 12/18/2016    Mixed hyperlipidemia 12/18/2016    Acute nonintractable headache 12/18/2016    Obsessive compulsive disorder 12/18/2016    Anxiety 12/18/2016    ADH disorder 12/18/2016     He  has a past surgical history that includes Appendectomy; Tonsillectomy; Kidney stone surgery; Transurethral resection of prostate; [place] tooth extraction; and Colonoscopy.  His family history includes Cancer in his father and mother; Diabetes in his mother; Heart attack in his father; Heart disease in his father;  Prostate cancer in his father.  He  reports that he has never smoked. He has never used smokeless tobacco. He reports that he does not drink alcohol and does not use drugs.  Current Outpatient Medications   Medication Sig Dispense Refill    clomiPRAMINE (ANAFRANIL) 25 mg capsule Take 25 mg by mouth daily at bedtime      clonazePAM (KlonoPIN) 0.5 mg tablet Take 0.5 mg by mouth daily at bedtime .5 mg once at night      folic acid (FOLVITE) 1 mg tablet Take 1 mg by mouth daily      pantoprazole (PROTONIX) 40 mg tablet Take 1 tablet (40 mg total) by mouth daily before breakfast 30 tablet 0    QUEtiapine (SEROquel) 50 mg tablet Take 50 mg by mouth daily at bedtime      tamsulosin (FLOMAX) 0.4 mg Take 1 capsule (0.4 mg total) by mouth daily with dinner Begin 5 days before surgery 10 capsule 0    vilazodone (VIIBRYD) 40 mg tablet Take 40 mg by mouth daily with breakfast      famotidine (PEPCID) 20 mg tablet Take 1 tablet (20 mg total) by mouth 2 (two) times a day (Patient not taking: Reported on 2/2/2024) 60 tablet 0    methotrexate 2.5 MG tablet Take by mouth 5 tablets on Sundays  (Patient not taking: Reported on 2/2/2024)      simvastatin (ZOCOR) 10 mg tablet Take 1 tablet (10 mg total) by mouth daily at bedtime (Patient not taking: Reported on 1/5/2024) 90 tablet 1    vilazodone (VIIBRYD) 20 mg tablet 40 mg daily with breakfast (Patient not taking: Reported on 2/2/2024)       No current facility-administered medications for this visit.     Current Outpatient Medications on File Prior to Visit   Medication Sig    clomiPRAMINE (ANAFRANIL) 25 mg capsule Take 25 mg by mouth daily at bedtime    clonazePAM (KlonoPIN) 0.5 mg tablet Take 0.5 mg by mouth daily at bedtime .5 mg once at night    folic acid (FOLVITE) 1 mg tablet Take 1 mg by mouth daily    pantoprazole (PROTONIX) 40 mg tablet Take 1 tablet (40 mg total) by mouth daily before breakfast    QUEtiapine (SEROquel) 50 mg tablet Take 50 mg by mouth daily at bedtime     "tamsulosin (FLOMAX) 0.4 mg Take 1 capsule (0.4 mg total) by mouth daily with dinner Begin 5 days before surgery    vilazodone (VIIBRYD) 40 mg tablet Take 40 mg by mouth daily with breakfast    famotidine (PEPCID) 20 mg tablet Take 1 tablet (20 mg total) by mouth 2 (two) times a day (Patient not taking: Reported on 2/2/2024)    methotrexate 2.5 MG tablet Take by mouth 5 tablets on Sundays  (Patient not taking: Reported on 2/2/2024)    simvastatin (ZOCOR) 10 mg tablet Take 1 tablet (10 mg total) by mouth daily at bedtime (Patient not taking: Reported on 1/5/2024)    vilazodone (VIIBRYD) 20 mg tablet 40 mg daily with breakfast (Patient not taking: Reported on 2/2/2024)     No current facility-administered medications on file prior to visit.     He is allergic to tamsulosin..    Review of Systems   Constitutional:  Negative for chills and fever.   HENT:  Negative for congestion, ear pain and sore throat.    Eyes:  Negative for pain.   Respiratory:  Negative for cough and shortness of breath.    Cardiovascular:  Negative for chest pain and leg swelling.   Gastrointestinal:  Negative for abdominal pain, nausea and vomiting.   Endocrine: Negative for polyuria.   Genitourinary:  Negative for difficulty urinating, frequency and urgency.   Musculoskeletal:  Negative for arthralgias and back pain.   Skin:  Negative for rash.   Neurological:  Negative for weakness and headaches.   Psychiatric/Behavioral:  Negative for sleep disturbance. The patient is not nervous/anxious.          Objective:      /68 (BP Location: Left arm, Patient Position: Sitting, Cuff Size: Large)   Pulse 98   Temp 97.7 °F (36.5 °C) (Temporal)   Ht 5' 7\" (1.702 m)   Wt 74.4 kg (164 lb)   SpO2 95%   BMI 25.69 kg/m²     Recent Results (from the past 1344 hour(s))   CBC and Platelet    Collection Time: 02/01/24  9:55 AM   Result Value Ref Range    WBC 4.96 4.31 - 10.16 Thousand/uL    RBC 5.09 3.88 - 5.62 Million/uL    Hemoglobin 15.1 12.0 - 17.0 " g/dL    Hematocrit 45.7 36.5 - 49.3 %    MCV 90 82 - 98 fL    MCH 29.7 26.8 - 34.3 pg    MCHC 33.0 31.4 - 37.4 g/dL    RDW 12.5 11.6 - 15.1 %    Platelets 255 149 - 390 Thousands/uL    MPV 9.9 8.9 - 12.7 fL   Comprehensive metabolic panel    Collection Time: 02/01/24  9:55 AM   Result Value Ref Range    Sodium 139 135 - 147 mmol/L    Potassium 3.7 3.5 - 5.3 mmol/L    Chloride 104 96 - 108 mmol/L    CO2 29 21 - 32 mmol/L    ANION GAP 6 mmol/L    BUN 16 5 - 25 mg/dL    Creatinine 1.15 0.60 - 1.30 mg/dL    Glucose, Fasting 170 (H) 65 - 99 mg/dL    Calcium 9.2 8.4 - 10.2 mg/dL    AST 16 13 - 39 U/L    ALT 15 7 - 52 U/L    Alkaline Phosphatase 86 34 - 104 U/L    Total Protein 6.4 6.4 - 8.4 g/dL    Albumin 4.2 3.5 - 5.0 g/dL    Total Bilirubin 0.78 0.20 - 1.00 mg/dL    eGFR 64 ml/min/1.73sq m   EKG 12 lead    Collection Time: 02/01/24 10:06 AM   Result Value Ref Range    Ventricular Rate 86 BPM    Atrial Rate 86 BPM    LA Interval 172 ms    QRSD Interval 90 ms    QT Interval 364 ms    QTC Interval 435 ms    P Axis 66 degrees    QRS Axis 25 degrees    T Wave Axis 43 degrees        Physical Exam  Constitutional:       Appearance: Normal appearance.   HENT:      Head: Normocephalic.      Right Ear: Tympanic membrane, ear canal and external ear normal.      Left Ear: Tympanic membrane, ear canal and external ear normal.      Nose: Nose normal. No congestion.      Mouth/Throat:      Mouth: Mucous membranes are moist.      Pharynx: Oropharynx is clear. No oropharyngeal exudate or posterior oropharyngeal erythema.   Eyes:      Extraocular Movements: Extraocular movements intact.      Conjunctiva/sclera: Conjunctivae normal.   Cardiovascular:      Rate and Rhythm: Normal rate and regular rhythm.      Heart sounds: Normal heart sounds. No murmur heard.  Pulmonary:      Effort: Pulmonary effort is normal.      Breath sounds: Normal breath sounds. No wheezing or rales.   Abdominal:      General: Abdomen is flat. There is no  distension.      Palpations: Abdomen is soft.      Tenderness: There is no abdominal tenderness.   Musculoskeletal:         General: Normal range of motion.      Cervical back: Normal range of motion and neck supple.      Right lower leg: No edema.      Left lower leg: No edema.   Lymphadenopathy:      Cervical: No cervical adenopathy.   Skin:     General: Skin is warm.   Neurological:      General: No focal deficit present.      Mental Status: He is alert and oriented to person, place, and time.

## 2024-02-03 ENCOUNTER — APPOINTMENT (OUTPATIENT)
Dept: LAB | Facility: CLINIC | Age: 69
End: 2024-02-03
Payer: MEDICARE

## 2024-02-03 DIAGNOSIS — R73.01 IMPAIRED FASTING BLOOD SUGAR: ICD-10-CM

## 2024-02-03 LAB
EST. AVERAGE GLUCOSE BLD GHB EST-MCNC: 140 MG/DL
HBA1C MFR BLD: 6.5 %

## 2024-02-03 PROCEDURE — 83036 HEMOGLOBIN GLYCOSYLATED A1C: CPT

## 2024-02-03 PROCEDURE — 36415 COLL VENOUS BLD VENIPUNCTURE: CPT

## 2024-02-07 ENCOUNTER — TELEPHONE (OUTPATIENT)
Age: 69
End: 2024-02-07

## 2024-02-07 NOTE — TELEPHONE ENCOUNTER
Patient calling in and asking what out of pocket would be for surgery and what his insurance covers     Patient asking for a call back at [phone]

## 2024-02-08 ENCOUNTER — ANESTHESIA EVENT (OUTPATIENT)
Dept: PERIOP | Facility: AMBULARY SURGERY CENTER | Age: 69
End: 2024-02-08
Payer: MEDICARE

## 2024-02-13 ENCOUNTER — NURSE TRIAGE (OUTPATIENT)
Age: 69
End: 2024-02-13

## 2024-02-13 NOTE — TELEPHONE ENCOUNTER
Pt calling back for Rochelle.  States he is having hernia repair on 2/16/24.  He has questions about when to take his Dulcolax the night before.  RN reviewed chart, but do not see Dulcolax listed on message in chart from PAT.  Surg Coordinator for office unavailable at this time.  RN advised she will send message to office for Surgery Coordinator to reach out to pt. Pt verbalizes understanding.

## 2024-02-13 NOTE — PRE-PROCEDURE INSTRUCTIONS
Pre-Surgery Instructions:   Medication Instructions    clomiPRAMINE (ANAFRANIL) 25 mg capsule Take night before surgery    clonazePAM (KlonoPIN) 0.5 mg tablet Take night before surgery    folic acid (FOLVITE) 1 mg tablet Stop taking 3 days prior to surgery.    pantoprazole (PROTONIX) 40 mg tablet Take day of surgery.    QUEtiapine (SEROquel) 50 mg tablet Take night before surgery    tamsulosin (FLOMAX) 0.4 mg Take day of surgery.    vilazodone (VIIBRYD) 40 mg tablet Take day of surgery.   Phone assessment done 3 days prior to surgery.    Medication instructions for day surgery reviewed. Please use only a sip of water to take your instructed medications. Avoid all over the counter vitamins, supplements and NSAIDS for one week prior to surgery per anesthesia guidelines. Tylenol is ok to take as needed.     You will receive a call one business day prior to surgery with an arrival time and hospital directions. If your surgery is scheduled on a Monday, the hospital will be calling you on the Friday prior to your surgery. If you have not heard from anyone by 8pm, please call the hospital supervisor through the hospital  at [phone]. ([place] [phone] or [place] [phone]).    Do not eat or drink anything after midnight the night before your surgery, including candy, mints, lifesavers, or chewing gum. Do not drink alcohol 24hrs before your surgery. Try not to smoke at least 24hrs before your surgery.       Follow the pre surgery showering instructions as listed in the “My Surgical Experience Booklet” or otherwise provided by your surgeon's office. Do not use a blade to shave the surgical area 1 week before surgery. It is okay to use a clean electric clippers up to 24 hours before surgery. Do not apply any lotions, creams, including makeup, cologne, deodorant, or perfumes after showering on the day of your surgery. Do not use dry shampoo, hair spray, hair gel, or any type of hair products.     No  contact lenses, eye make-up, or artificial eyelashes. Remove nail polish, including gel polish, and any artificial, gel, or acrylic nails if possible. Remove all jewelry including rings and body piercing jewelry.     Wear causal clothing that is easy to take on and off. Consider your type of surgery.    Keep any valuables, jewelry, piercings at home. Please bring any specially ordered equipment (sling, braces) if indicated.    Arrange for a responsible person to drive you to and from the hospital on the day of your surgery. Visitor Guidelines discussed.     Call the surgeon's office with any new illnesses, exposures, or additional questions prior to surgery.    Please reference your “My Surgical Experience Booklet” for additional information to prepare for your upcoming surgery.

## 2024-02-15 RX ORDER — OXYCODONE HYDROCHLORIDE AND ACETAMINOPHEN 5; 325 MG/1; MG/1
1 TABLET ORAL EVERY 4 HOURS PRN
Qty: 10 TABLET | Refills: 0 | Status: SHIPPED | OUTPATIENT
Start: 2024-02-15 | End: 2024-02-28

## 2024-02-15 NOTE — DISCHARGE INSTR - AVS FIRST PAGE
Please call the office when you leave to schedule an appointment for 2 weeks.              Please call [phone]. [address], [address], [place], [address]. [address] between [place] and [address].     Activity:    May lift 10 lb as many times as desired the 1st week,       20 lb in 2 weeks,       30 lb in 3 weeks.                Walking is encouraged  Normal daily activities including climbing steps are okay  Do not engage in strenuous activity ( sit-ups or crutches) or contact sports for 4-6 weeks post-operatively    Return to Work:   Okay to return to work when you feel well if you desire.        Diet:   You may return to your normal healthy diet.    Wound Care:  Your wound is closed with dissolvable stitches and glue.  It is okay to shower. Wash incision gently with soap and water and pat dry. Do not soak incisions in bath water or swim for two weeks. Do not apply any creams or ointments.    Pain Medication:   Please take as directed if needed. May use Advil or Motrin in addition.  Recall, the pain medicine and anesthesia is associated with constipation.    No driving while taking narcotic pain medications.      Other:  It is normal to developed a “healing ridge” / firm incision after surgery.  This is your body making scar tissue.  It is a good sign  Constipation is very common after general anesthesia.  Please use milk of magnesia as needed in order to help prevent constipation.  It is normal to get bruising after surgery.  If you have questions after discharge please call the office.    If you have increased pain, fever >101.5, increased drainage, redness or a bad smell at your surgery site, please call us immediately or come directly to the Emergency Room

## 2024-02-16 ENCOUNTER — HOSPITAL ENCOUNTER (OUTPATIENT)
Facility: AMBULARY SURGERY CENTER | Age: 69
Setting detail: OUTPATIENT SURGERY
Discharge: HOME | End: 2024-02-16
Attending: SURGERY | Admitting: SURGERY
Payer: MEDICARE

## 2024-02-16 ENCOUNTER — ANESTHESIA (OUTPATIENT)
Dept: PERIOP | Facility: AMBULARY SURGERY CENTER | Age: 69
End: 2024-02-16
Payer: MEDICARE

## 2024-02-16 VITALS
HEIGHT: 67 IN | RESPIRATION RATE: 18 BRPM | TEMPERATURE: 97 F | DIASTOLIC BLOOD PRESSURE: 83 MMHG | SYSTOLIC BLOOD PRESSURE: 140 MMHG | OXYGEN SATURATION: 93 % | WEIGHT: 161.4 LBS | HEART RATE: 88 BPM | BODY MASS INDEX: 25.33 KG/M2

## 2024-02-16 DIAGNOSIS — K40.90 LEFT INGUINAL HERNIA: Primary | ICD-10-CM

## 2024-02-16 PROCEDURE — 49591 RPR AA HRN 1ST < 3 CM RDC: CPT

## 2024-02-16 PROCEDURE — C1781 MESH (IMPLANTABLE): HCPCS | Performed by: SURGERY

## 2024-02-16 PROCEDURE — 49505 PRP I/HERN INIT REDUC >5 YR: CPT

## 2024-02-16 DEVICE — MODIFIED ONFLEX MESH
Type: IMPLANTABLE DEVICE | Site: INGUINAL | Status: FUNCTIONAL
Brand: MODIFIED ONFLEX MESH

## 2024-02-16 RX ORDER — FENTANYL CITRATE/PF 50 MCG/ML
25 SYRINGE (ML) INJECTION
Status: DISCONTINUED | OUTPATIENT
Start: 2024-02-16 | End: 2024-02-16 | Stop reason: HOSPADM

## 2024-02-16 RX ORDER — FUROSEMIDE 10 MG/ML
INJECTION INTRAMUSCULAR; INTRAVENOUS AS NEEDED
Status: DISCONTINUED | OUTPATIENT
Start: 2024-02-16 | End: 2024-02-16

## 2024-02-16 RX ORDER — DEXAMETHASONE SODIUM PHOSPHATE 10 MG/ML
INJECTION, SOLUTION INTRAMUSCULAR; INTRAVENOUS AS NEEDED
Status: DISCONTINUED | OUTPATIENT
Start: 2024-02-16 | End: 2024-02-16

## 2024-02-16 RX ORDER — MAGNESIUM HYDROXIDE 1200 MG/15ML
LIQUID ORAL AS NEEDED
Status: DISCONTINUED | OUTPATIENT
Start: 2024-02-16 | End: 2024-02-16 | Stop reason: HOSPADM

## 2024-02-16 RX ORDER — SODIUM CHLORIDE, SODIUM LACTATE, POTASSIUM CHLORIDE, CALCIUM CHLORIDE 600; 310; 30; 20 MG/100ML; MG/100ML; MG/100ML; MG/100ML
INJECTION, SOLUTION INTRAVENOUS CONTINUOUS PRN
Status: DISCONTINUED | OUTPATIENT
Start: 2024-02-16 | End: 2024-02-16

## 2024-02-16 RX ORDER — LIDOCAINE HYDROCHLORIDE 10 MG/ML
INJECTION, SOLUTION EPIDURAL; INFILTRATION; INTRACAUDAL; PERINEURAL AS NEEDED
Status: DISCONTINUED | OUTPATIENT
Start: 2024-02-16 | End: 2024-02-16

## 2024-02-16 RX ORDER — HYDROMORPHONE HCL/PF 1 MG/ML
0.4 SYRINGE (ML) INJECTION
Status: DISCONTINUED | OUTPATIENT
Start: 2024-02-16 | End: 2024-02-16 | Stop reason: HOSPADM

## 2024-02-16 RX ORDER — ONDANSETRON 2 MG/ML
4 INJECTION INTRAMUSCULAR; INTRAVENOUS EVERY 4 HOURS PRN
Status: DISCONTINUED | OUTPATIENT
Start: 2024-02-16 | End: 2024-02-16 | Stop reason: HOSPADM

## 2024-02-16 RX ORDER — BUPIVACAINE HYDROCHLORIDE 2.5 MG/ML
INJECTION, SOLUTION EPIDURAL; INFILTRATION; INTRACAUDAL AS NEEDED
Status: DISCONTINUED | OUTPATIENT
Start: 2024-02-16 | End: 2024-02-16 | Stop reason: HOSPADM

## 2024-02-16 RX ORDER — ONDANSETRON 2 MG/ML
4 INJECTION INTRAMUSCULAR; INTRAVENOUS ONCE AS NEEDED
Status: DISCONTINUED | OUTPATIENT
Start: 2024-02-16 | End: 2024-02-16 | Stop reason: HOSPADM

## 2024-02-16 RX ORDER — CEFAZOLIN SODIUM 1 G/50ML
1000 SOLUTION INTRAVENOUS ONCE
Status: COMPLETED | OUTPATIENT
Start: 2024-02-16 | End: 2024-02-16

## 2024-02-16 RX ORDER — HYDROMORPHONE HCL/PF 1 MG/ML
0.5 SYRINGE (ML) INJECTION
Status: DISCONTINUED | OUTPATIENT
Start: 2024-02-16 | End: 2024-02-16 | Stop reason: HOSPADM

## 2024-02-16 RX ORDER — KETOROLAC TROMETHAMINE 30 MG/ML
INJECTION, SOLUTION INTRAMUSCULAR; INTRAVENOUS AS NEEDED
Status: DISCONTINUED | OUTPATIENT
Start: 2024-02-16 | End: 2024-02-16

## 2024-02-16 RX ORDER — PROPOFOL 10 MG/ML
INJECTION, EMULSION INTRAVENOUS AS NEEDED
Status: DISCONTINUED | OUTPATIENT
Start: 2024-02-16 | End: 2024-02-16

## 2024-02-16 RX ORDER — FENTANYL CITRATE 50 UG/ML
INJECTION, SOLUTION INTRAMUSCULAR; INTRAVENOUS AS NEEDED
Status: DISCONTINUED | OUTPATIENT
Start: 2024-02-16 | End: 2024-02-16

## 2024-02-16 RX ORDER — OXYCODONE HYDROCHLORIDE AND ACETAMINOPHEN 5; 325 MG/1; MG/1
1 TABLET ORAL EVERY 4 HOURS PRN
Status: DISCONTINUED | OUTPATIENT
Start: 2024-02-16 | End: 2024-02-16 | Stop reason: HOSPADM

## 2024-02-16 RX ORDER — ONDANSETRON 2 MG/ML
INJECTION INTRAMUSCULAR; INTRAVENOUS AS NEEDED
Status: DISCONTINUED | OUTPATIENT
Start: 2024-02-16 | End: 2024-02-16

## 2024-02-16 RX ORDER — ACETAMINOPHEN 325 MG/1
650 TABLET ORAL EVERY 4 HOURS PRN
Status: DISCONTINUED | OUTPATIENT
Start: 2024-02-16 | End: 2024-02-16 | Stop reason: HOSPADM

## 2024-02-16 RX ADMIN — KETOROLAC TROMETHAMINE 15 MG: 30 INJECTION, SOLUTION INTRAMUSCULAR; INTRAVENOUS at 14:19

## 2024-02-16 RX ADMIN — ONDANSETRON 4 MG: 2 INJECTION INTRAMUSCULAR; INTRAVENOUS at 13:45

## 2024-02-16 RX ADMIN — SODIUM CHLORIDE, SODIUM LACTATE, POTASSIUM CHLORIDE, AND CALCIUM CHLORIDE: .6; .31; .03; .02 INJECTION, SOLUTION INTRAVENOUS at 13:20

## 2024-02-16 RX ADMIN — FENTANYL CITRATE 25 MCG: 50 INJECTION INTRAMUSCULAR; INTRAVENOUS at 13:30

## 2024-02-16 RX ADMIN — LIDOCAINE HYDROCHLORIDE 50 MG: 10 INJECTION, SOLUTION EPIDURAL; INFILTRATION; INTRACAUDAL at 13:26

## 2024-02-16 RX ADMIN — FENTANYL CITRATE 25 MCG: 50 INJECTION INTRAMUSCULAR; INTRAVENOUS at 14:11

## 2024-02-16 RX ADMIN — DEXAMETHASONE SODIUM PHOSPHATE 8 MG: 10 INJECTION INTRAMUSCULAR; INTRAVENOUS at 13:46

## 2024-02-16 RX ADMIN — FENTANYL CITRATE 25 MCG: 50 INJECTION INTRAMUSCULAR; INTRAVENOUS at 13:33

## 2024-02-16 RX ADMIN — SODIUM CHLORIDE, SODIUM LACTATE, POTASSIUM CHLORIDE, AND CALCIUM CHLORIDE: .6; .31; .03; .02 INJECTION, SOLUTION INTRAVENOUS at 14:24

## 2024-02-16 RX ADMIN — FUROSEMIDE 5 MG: 10 INJECTION, SOLUTION INTRAMUSCULAR; INTRAVENOUS at 15:08

## 2024-02-16 RX ADMIN — CEFAZOLIN SODIUM 1000 MG: 1 SOLUTION INTRAVENOUS at 13:30

## 2024-02-16 RX ADMIN — PROPOFOL 200 MG: 10 INJECTION, EMULSION INTRAVENOUS at 13:26

## 2024-02-16 NOTE — INTERVAL H&P NOTE
H&P reviewed. After examining the patient I find no changes in the patients condition since the H&P had been written.    Vitals:    02/16/24 1204   BP: 126/85   Pulse: 100   Resp: 18   Temp: 97.6 °F (36.4 °C)   SpO2: 95%

## 2024-02-16 NOTE — OP NOTE
OPERATIVE REPORT  PATIENT NAME: Juan Lucero    :  1955  MRN: [id number]  Pt Location: [facility] OR ROOM 05    SURGERY DATE: 2024    Surgeons and Role:     * Pepe Woodall MD - Primary     * Tata Al MD - Assisting    Preop Diagnosis:  Inguinal hernia [K40.90]  Umbilical hernia [K42.9]    Post-Op Diagnosis Codes:     * Inguinal hernia [K40.90]     * Umbilical hernia [K42.9]    Procedure(s):  Left - REPAIR HERNIA INGUINAL  REPAIR HERNIA UMBILICAL    Specimen(s):  * No specimens in log *    Estimated Blood Loss:   Minimal    Drains:  * No LDAs found *    Anesthesia Type:   General    Operative Indications:  Inguinal hernia [K40.90]  Umbilical hernia [K42.9]      Independent, non-smoker, ASA 2, wound class I, BMI 25, weight 160, height 67  ) HTN (hypertension)   (+) Mixed hyperlipidemia   (+) Other chest pain       GI/HEPATIC   (+) Gastroesophageal reflux disease without esophagitis       NEURO/PSYCH   (+) Acute nonintractable headache   (+) Anxiety   (+) Depression, recurrent (HCC)   (+) Obsessive compulsive disorder         Complications:   None    Procedure and Technique:  Juan Lucero is a 68 y.o. male was brought into the operative suite and identified visually and by arm band. The patient was placed in the supine position.  Careful attention was made towards padding and positioning of the extremities.  After sterile prep and drape, a timeout was completed. The prep was dry.  Antibiotics were provided. Athrombic pumps in place.    0.25% Marcaine with epinephrine was utilized throughout the procedure.  An incision was made  within the left inguinal region.  The incision was carried down through the skin and subcutaneous tissue. The superficial epigastric vein was clamped, ligated and  divided. . The external oblique fibers were identified.  The external ring was identified.  The external oblique fibers were then split in the direction of their fibers.  Hemostats were  placed on the cut edges.  A self-retaining retractor was then placed.    Exploration of the inguinal canal commenced.  The cremasteric fibers were then divided along the fiber direction of its fibers the cord structures.   The cord was encircled in a atraumatic Penrose drain and preserved during dissection.  Identification of a indirect inguinal hernia was performed. High dissection was completed at the level of the internal ring.  Preperitoneal dissection commenced identifying and preserving the inferior epigastric vessels.    A preperitoneal mesh was then inserted.  The branch of the genitofemoral nerve was divided in order to help prevent postoperative neuralgia.  The inguinal floor was then repaired with interrupted figure-of-eight 0 Vicryl suture.  The indirect inguinal ring was repositioned more superiorly while repairing the inguinal transversalis muscular floor.  An onlay mesh was then secured to the tendon overlying the lateral pubic tubercle The external oblique fascia was closed with a running 3-0 PDS suture.  Additional 0.25% Marcaine with epinephrine was injected over the ilioinguinal and iliohypogastric nerves.    3-0 Vicryl subcutaneous suture was placed into the Rudy's fascia .   4-0 Monocryl suture was used for the subcuticular layer. Dermabond was then applied.    Umbilical hernia was then approached.  Incision was carried down through skin subtenons tissue.  The hernia sac was dissected free and retracted.  This measures 1.5 cm in size.  This was primarily closed interrupted figure-of-eight 0 Vicryl suture.  Followed by 3-0 Vicryl subcutaneous and 4-0 Monocryl sutures.  Dermabond was applied.  Patient tolerated this procedure well.  Sponge and instrument count correct x 2.    The patient was then awakened from general anesthesia and transferred to the recovery room in stable condition. Sponge and instrument count correct ×2.     I was present for the entire procedure.    Patient  Disposition:  PACU         SIGNATURE: Pepe Woodall MD  DATE: February 16, 2024  TIME: 3:06 PM

## 2024-02-16 NOTE — ANESTHESIA PREPROCEDURE EVALUATION
Procedure:  REPAIR HERNIA INGUINAL (Left: Groin)  REPAIR HERNIA UMBILICAL (Abdomen)    Relevant Problems   CARDIO   (+) HTN (hypertension)   (+) Mixed hyperlipidemia   (+) Other chest pain      GI/HEPATIC   (+) Gastroesophageal reflux disease without esophagitis      NEURO/PSYCH   (+) Acute nonintractable headache   (+) Anxiety   (+) Depression, recurrent (HCC)   (+) Obsessive compulsive disorder   Stress results: Target heart rate was achieved. There was no chest pain during stress.  -  ECG conclusions: The stress ECG was negative for ischemia and normal.  -  Perfusion imaging: There was a small to moderate, mildly severe, partially reversible myocardial perfusion defect of the basal inferior wall likely due to diaphragm attenuation.  -  Gated SPECT: The calculated left ventricular ejection fraction was 72 %. Left ventricular ejection fraction was within normal limits by visual estimate. There was no diagnostic evidence for left ventricular regional abnormality.     IMPRESSIONS: Likely Normal study after pharmacologic stress without reproduction of symptoms. There was image artifact, without diagnostic evidence for perfusion abnormality. Left ventricular systolic function was normal.     Physical Exam    Airway    Mallampati score: II  TM Distance: >3 FB  Neck ROM: full     Dental   No notable dental hx     Cardiovascular  Rhythm: regular, Rate: normal, No weak pulses    Pulmonary   No stridor    Other Findings        Anesthesia Plan  ASA Score- 2     Anesthesia Type- general with ASA Monitors.         Additional Monitors:     Airway Plan: LMA.           Plan Factors-    Chart reviewed. EKG reviewed.  Existing labs reviewed. Patient summary reviewed.                  Induction- intravenous.    Postoperative Plan- Plan for postoperative opioid use. Planned trial extubation    Informed Consent- Anesthetic plan and risks discussed with patient.  I personally reviewed this patient with the CRNA. Discussed and agreed on  the Anesthesia Plan with the CRNA..

## 2024-02-19 NOTE — ANESTHESIA PROCEDURE NOTES
Anesthesia Notable Event    Date/Time: 2/16/2024 3:11 PM    Performed by: Hamzah Carreon MD  Authorized by: Hamzah Carreon MD

## 2024-02-21 DIAGNOSIS — K21.9 GASTROESOPHAGEAL REFLUX DISEASE WITHOUT ESOPHAGITIS: ICD-10-CM

## 2024-02-21 RX ORDER — PANTOPRAZOLE SODIUM 40 MG/1
40 TABLET, DELAYED RELEASE ORAL
Qty: 30 TABLET | Refills: 0 | Status: SHIPPED | OUTPATIENT
Start: 2024-02-21 | End: 2024-08-19

## 2024-02-28 ENCOUNTER — OFFICE VISIT (OUTPATIENT)
Dept: INTERNAL MEDICINE CLINIC | Facility: CLINIC | Age: 69
End: 2024-02-28
Payer: MEDICARE

## 2024-02-28 ENCOUNTER — TELEPHONE (OUTPATIENT)
Dept: INTERNAL MEDICINE CLINIC | Facility: CLINIC | Age: 69
End: 2024-02-28

## 2024-02-28 VITALS
OXYGEN SATURATION: 96 % | TEMPERATURE: 97.3 F | HEIGHT: 67 IN | DIASTOLIC BLOOD PRESSURE: 74 MMHG | HEART RATE: 94 BPM | WEIGHT: 162 LBS | BODY MASS INDEX: 25.43 KG/M2 | SYSTOLIC BLOOD PRESSURE: 118 MMHG

## 2024-02-28 DIAGNOSIS — F42.8 OTHER OBSESSIVE-COMPULSIVE DISORDERS: ICD-10-CM

## 2024-02-28 DIAGNOSIS — Z23 NEED FOR VACCINATION: ICD-10-CM

## 2024-02-28 DIAGNOSIS — M35.3 POLYMYALGIA RHEUMATICA SYNDROME (HCC): ICD-10-CM

## 2024-02-28 DIAGNOSIS — R73.01 IMPAIRED FASTING BLOOD SUGAR: ICD-10-CM

## 2024-02-28 DIAGNOSIS — E78.2 MIXED HYPERLIPIDEMIA: Primary | ICD-10-CM

## 2024-02-28 DIAGNOSIS — K21.9 GASTROESOPHAGEAL REFLUX DISEASE WITHOUT ESOPHAGITIS: ICD-10-CM

## 2024-02-28 DIAGNOSIS — Z23 NEED FOR PROPHYLACTIC VACCINATION WITH COMBINED DIPHTHERIA-TETANUS-PERTUSSIS (DTP) VACCINE: Primary | ICD-10-CM

## 2024-02-28 PROCEDURE — 99214 OFFICE O/P EST MOD 30 MIN: CPT | Performed by: INTERNAL MEDICINE

## 2024-02-28 NOTE — TELEPHONE ENCOUNTER
Need a  prescription to get a vaccine   Tetanus.   And RSV vaccine.       Patient needs both of these.

## 2024-02-28 NOTE — PROGRESS NOTES
Assessment/Plan:             1. Mixed hyperlipidemia  -     Comprehensive metabolic panel; Future  -     Lipid Panel with Direct LDL reflex; Future  -     TSH, 3rd generation; Future    2. Impaired fasting blood sugar  -     CBC and differential; Future  -     Hemoglobin A1C; Future    3. Gastroesophageal reflux disease without esophagitis    4. Polymyalgia rheumatica syndrome (HCC)    5. Other obsessive-compulsive disorders           Subjective:      Patient ID: Juan Lucero is a 68 y.o. male.    Follow-up on multiple medical problems to ensure they are stable on current medications        The following portions of the patient's history were reviewed and updated as appropriate: He  has a past medical history of Anxiety, Depression, GERD (gastroesophageal reflux disease), Hyperlipidemia, Kidney stone, Polymyalgia rheumatica syndrome (HCC), and Psychiatric disorder.  He   Patient Active Problem List    Diagnosis Date Noted    Temporal arteritis (HCC) 02/02/2024    Left inguinal hernia 11/13/2023    Umbilical hernia without obstruction and without gangrene 11/03/2023    Gastroesophageal reflux disease without esophagitis 11/03/2023    Depression, recurrent (HCC) 05/23/2023    Preop exam for internal medicine 07/20/2022    Polymyalgia rheumatica syndrome (HCC) 05/18/2022    Impaired fasting blood sugar 05/18/2022    Other chest pain 11/17/2020    Pain in left knee 05/23/2018    Left facial numbness 12/18/2016    History of temporal arteritis 12/18/2016    HTN (hypertension) 12/18/2016    Mixed hyperlipidemia 12/18/2016    Acute nonintractable headache 12/18/2016    Obsessive compulsive disorder 12/18/2016    Anxiety 12/18/2016    ADH disorder 12/18/2016     He  has a past surgical history that includes Appendectomy; Tonsillectomy; Kidney stone surgery; Transurethral resection of prostate; Icard tooth extraction; Colonoscopy; pr rpr 1st ingun hrna age 4 yrs/> reducible (Left, 2/16/2024); and pr rpr aa hernia  1st < 3 cm reducible (N/A, 2/16/2024).  His family history includes Cancer in his father and mother; Diabetes in his mother; Heart attack in his father; Heart disease in his father; Prostate cancer in his father.  He  reports that he has never smoked. He has never used smokeless tobacco. He reports that he does not drink alcohol and does not use drugs.  Current Outpatient Medications   Medication Sig Dispense Refill    clomiPRAMINE (ANAFRANIL) 25 mg capsule Take 25 mg by mouth daily at bedtime      clonazePAM (KlonoPIN) 0.5 mg tablet Take 0.5 mg by mouth daily at bedtime .5 mg once at night      pantoprazole (PROTONIX) 40 mg tablet Take 1 tablet (40 mg total) by mouth daily before breakfast 30 tablet 0    QUEtiapine (SEROquel) 50 mg tablet Take 50 mg by mouth daily at bedtime      simvastatin (ZOCOR) 10 mg tablet Take 1 tablet (10 mg total) by mouth daily at bedtime 90 tablet 1    vilazodone (VIIBRYD) 40 mg tablet Take 40 mg by mouth daily with breakfast      famotidine (PEPCID) 20 mg tablet Take 1 tablet (20 mg total) by mouth 2 (two) times a day (Patient not taking: Reported on 2/28/2024) 60 tablet 0    folic acid (FOLVITE) 1 mg tablet Take 1 mg by mouth daily (Patient not taking: Reported on 2/28/2024)      methotrexate 2.5 MG tablet Take by mouth 5 tablets on Sundays  (Patient not taking: Reported on 2/28/2024)      vilazodone (VIIBRYD) 20 mg tablet 40 mg daily with breakfast (Patient not taking: Reported on 2/28/2024)       No current facility-administered medications for this visit.     Current Outpatient Medications on File Prior to Visit   Medication Sig    clomiPRAMINE (ANAFRANIL) 25 mg capsule Take 25 mg by mouth daily at bedtime    clonazePAM (KlonoPIN) 0.5 mg tablet Take 0.5 mg by mouth daily at bedtime .5 mg once at night    pantoprazole (PROTONIX) 40 mg tablet Take 1 tablet (40 mg total) by mouth daily before breakfast    QUEtiapine (SEROquel) 50 mg tablet Take 50 mg by mouth daily at bedtime     simvastatin (ZOCOR) 10 mg tablet Take 1 tablet (10 mg total) by mouth daily at bedtime    vilazodone (VIIBRYD) 40 mg tablet Take 40 mg by mouth daily with breakfast    famotidine (PEPCID) 20 mg tablet Take 1 tablet (20 mg total) by mouth 2 (two) times a day (Patient not taking: Reported on 2/28/2024)    folic acid (FOLVITE) 1 mg tablet Take 1 mg by mouth daily (Patient not taking: Reported on 2/28/2024)    methotrexate 2.5 MG tablet Take by mouth 5 tablets on Sundays  (Patient not taking: Reported on 2/28/2024)    vilazodone (VIIBRYD) 20 mg tablet 40 mg daily with breakfast (Patient not taking: Reported on 2/28/2024)    [DISCONTINUED] oxyCODONE-acetaminophen (PERCOCET) 5-325 mg per tablet Take 1 tablet by mouth every 4 (four) hours as needed for moderate pain for up to 10 doses Max Daily Amount: 6 tablets (Patient not taking: Reported on 2/28/2024)    [DISCONTINUED] tamsulosin (FLOMAX) 0.4 mg Take 1 capsule (0.4 mg total) by mouth daily with dinner Begin 5 days before surgery (Patient not taking: Reported on 2/28/2024)     No current facility-administered medications on file prior to visit.     He is allergic to tamsulosin..    Review of Systems   Constitutional:  Negative for chills and fever.   HENT:  Negative for congestion, ear pain and sore throat.    Eyes:  Negative for pain.   Respiratory:  Negative for cough and shortness of breath.    Cardiovascular:  Negative for chest pain and leg swelling.   Gastrointestinal:  Negative for abdominal pain, nausea and vomiting.   Endocrine: Negative for polyuria.   Genitourinary:  Negative for difficulty urinating, frequency and urgency.   Musculoskeletal:  Negative for arthralgias and back pain.   Skin:  Negative for rash.   Neurological:  Negative for weakness and headaches.   Psychiatric/Behavioral:  Negative for sleep disturbance. The patient is not nervous/anxious.          Objective:      /74 (BP Location: Left arm, Patient Position: Sitting, Cuff Size: Adult)   " Pulse 94   Temp (!) 97.3 °F (36.3 °C) (Temporal)   Ht 5' 7\" (1.702 m)   Wt 73.5 kg (162 lb)   SpO2 96%   BMI 25.37 kg/m²     Recent Results (from the past 1344 hour(s))   CBC and Platelet    Collection Time: 02/01/24  9:55 AM   Result Value Ref Range    WBC 4.96 4.31 - 10.16 Thousand/uL    RBC 5.09 3.88 - 5.62 Million/uL    Hemoglobin 15.1 12.0 - 17.0 g/dL    Hematocrit 45.7 36.5 - 49.3 %    MCV 90 82 - 98 fL    MCH 29.7 26.8 - 34.3 pg    MCHC 33.0 31.4 - 37.4 g/dL    RDW 12.5 11.6 - 15.1 %    Platelets 255 149 - 390 Thousands/uL    MPV 9.9 8.9 - 12.7 fL   Comprehensive metabolic panel    Collection Time: 02/01/24  9:55 AM   Result Value Ref Range    Sodium 139 135 - 147 mmol/L    Potassium 3.7 3.5 - 5.3 mmol/L    Chloride 104 96 - 108 mmol/L    CO2 29 21 - 32 mmol/L    ANION GAP 6 mmol/L    BUN 16 5 - 25 mg/dL    Creatinine 1.15 0.60 - 1.30 mg/dL    Glucose, Fasting 170 (H) 65 - 99 mg/dL    Calcium 9.2 8.4 - 10.2 mg/dL    AST 16 13 - 39 U/L    ALT 15 7 - 52 U/L    Alkaline Phosphatase 86 34 - 104 U/L    Total Protein 6.4 6.4 - 8.4 g/dL    Albumin 4.2 3.5 - 5.0 g/dL    Total Bilirubin 0.78 0.20 - 1.00 mg/dL    eGFR 64 ml/min/1.73sq m   EKG 12 lead    Collection Time: 02/01/24 10:06 AM   Result Value Ref Range    Ventricular Rate 86 BPM    Atrial Rate 86 BPM    KS Interval 172 ms    QRSD Interval 90 ms    QT Interval 364 ms    QTC Interval 435 ms    P Axis 66 degrees    QRS Axis 25 degrees    T Wave Axis 43 degrees   Hemoglobin A1C    Collection Time: 02/03/24 11:08 AM   Result Value Ref Range    Hemoglobin A1C 6.5 (H) Normal 4.0-5.6%; PreDiabetic 5.7-6.4%; Diabetic >=6.5%; Glycemic control for adults with diabetes <7.0% %     mg/dl        Physical Exam  Constitutional:       Appearance: Normal appearance.   HENT:      Head: Normocephalic.      Right Ear: External ear normal.      Left Ear: External ear normal.      Nose: Nose normal. No congestion.      Mouth/Throat:      Mouth: Mucous membranes are " moist.      Pharynx: Oropharynx is clear. No oropharyngeal exudate or posterior oropharyngeal erythema.   Eyes:      Extraocular Movements: Extraocular movements intact.      Conjunctiva/sclera: Conjunctivae normal.   Cardiovascular:      Rate and Rhythm: Normal rate and regular rhythm.      Heart sounds: Normal heart sounds. No murmur heard.  Pulmonary:      Effort: Pulmonary effort is normal.      Breath sounds: Normal breath sounds. No wheezing or rales.   Abdominal:      General: Abdomen is flat. There is no distension.      Palpations: Abdomen is soft.      Tenderness: There is no abdominal tenderness.   Musculoskeletal:         General: Normal range of motion.      Cervical back: Normal range of motion and neck supple.      Right lower leg: No edema.      Left lower leg: No edema.   Lymphadenopathy:      Cervical: No cervical adenopathy.   Skin:     General: Skin is warm.   Neurological:      General: No focal deficit present.      Mental Status: He is alert and oriented to person, place, and time.

## 2024-02-29 ENCOUNTER — TELEPHONE (OUTPATIENT)
Dept: INTERNAL MEDICINE CLINIC | Facility: CLINIC | Age: 69
End: 2024-02-29

## 2024-02-29 NOTE — TELEPHONE ENCOUNTER
[facility] called and was questioning about the RSV vaccine, it was written for the [employer]. They are wondering if they can administer the EREXfee instead. And the second was for the [employer] and that one they don't carry. they wanted to see if we can administer the Boostrix,  call back number is [phone]

## 2024-03-04 ENCOUNTER — OFFICE VISIT (OUTPATIENT)
Dept: SURGERY | Facility: CLINIC | Age: 69
End: 2024-03-04

## 2024-03-04 DIAGNOSIS — K40.90 LEFT INGUINAL HERNIA: Primary | ICD-10-CM

## 2024-03-04 PROCEDURE — 99024 POSTOP FOLLOW-UP VISIT: CPT | Performed by: SURGERY

## 2024-03-04 NOTE — PROGRESS NOTES
Assessment/Plan: Patient is status post left inguinal hernia and umbilical hernia repair.  Overall he feels well.  He offers no complaints.  He is advance his activities nicely.  Incisions are clean and healing well.  All questions answered.    There are no diagnoses linked to this encounter.    Pathology: None sent      Postoperative restrictions reviewed. All questions answered.       ______________________________________________________  HPI: Patient presents post operatively.  Left inguinal hernia and umbilical hernia repair 2/16/2024.               ROS:  General ROS: negative for - chills, fatigue, fever or night sweats, weight loss  Respiratory ROS: no cough, shortness of breath, or wheezing  Cardiovascular ROS: no chest pain or dyspnea on exertion  Genito-Urinary ROS: no dysuria, trouble voiding, or hematuria  Musculoskeletal ROS: negative for - gait disturbance, joint pain or muscle pain  Neurological ROS: no TIA or stroke symptoms  GI ROS: see HPI  Skin ROS: no new rashes or lesions   Lymphatic ROS: no new adenopathy noted by pt.   GYN ROS: see HPI, no new GYN history or bleeding noted  Psy ROS: no new mental or behavioral disturbances         Patient Active Problem List   Diagnosis    Left facial numbness    History of temporal arteritis    HTN (hypertension)    Mixed hyperlipidemia    Acute nonintractable headache    Obsessive compulsive disorder    Anxiety    ADH disorder    Pain in left knee    Other chest pain    Polymyalgia rheumatica syndrome (HCC)    Impaired fasting blood sugar    Preop exam for internal medicine    Depression, recurrent (HCC)    Umbilical hernia without obstruction and without gangrene    Gastroesophageal reflux disease without esophagitis    Left inguinal hernia    Temporal arteritis (HCC)       Allergies:  Tamsulosin      Current Outpatient Medications:     clomiPRAMINE (ANAFRANIL) 25 mg capsule, Take 25 mg by mouth daily at bedtime, Disp: , Rfl:     clonazePAM (KlonoPIN) 0.5  mg tablet, Take 0.5 mg by mouth daily at bedtime .5 mg once at night, Disp: , Rfl:     famotidine (PEPCID) 20 mg tablet, Take 1 tablet (20 mg total) by mouth 2 (two) times a day (Patient not taking: Reported on 2/28/2024), Disp: 60 tablet, Rfl: 0    folic acid (FOLVITE) 1 mg tablet, Take 1 mg by mouth daily (Patient not taking: Reported on 2/28/2024), Disp: , Rfl:     methotrexate 2.5 MG tablet, Take by mouth 5 tablets on Sundays  (Patient not taking: Reported on 2/28/2024), Disp: , Rfl:     pantoprazole (PROTONIX) 40 mg tablet, Take 1 tablet (40 mg total) by mouth daily before breakfast, Disp: 30 tablet, Rfl: 0    QUEtiapine (SEROquel) 50 mg tablet, Take 50 mg by mouth daily at bedtime, Disp: , Rfl:     simvastatin (ZOCOR) 10 mg tablet, Take 1 tablet (10 mg total) by mouth daily at bedtime, Disp: 90 tablet, Rfl: 1    vilazodone (VIIBRYD) 20 mg tablet, 40 mg daily with breakfast (Patient not taking: Reported on 2/28/2024), Disp: , Rfl:     vilazodone (VIIBRYD) 40 mg tablet, Take 40 mg by mouth daily with breakfast, Disp: , Rfl:     Past Medical History:   Diagnosis Date    Anxiety     Depression     GERD (gastroesophageal reflux disease)     Hyperlipidemia     Kidney stone     Polymyalgia rheumatica syndrome (HCC)     Psychiatric disorder     anxiety       Past Surgical History:   Procedure Laterality Date    APPENDECTOMY      COLONOSCOPY      KIDNEY STONE SURGERY      WY RPR 1ST INGUN HRNA AGE 4 YRS/> REDUCIBLE Left 2/16/2024    Procedure: REPAIR HERNIA INGUINAL;  Surgeon: Pepe Woodall MD;  Location: [facility];  Service: General    WY RPR AA HERNIA 1ST < 3 CM REDUCIBLE N/A 2/16/2024    Procedure: REPAIR HERNIA UMBILICAL;  Surgeon: Pepe Woodall MD;  Location: [facility];  Service: General    TONSILLECTOMY      TRANSURETHRAL RESECTION OF PROSTATE      WISDOM TOOTH EXTRACTION         Family History   Problem Relation Age of Onset    Diabetes Mother     Cancer Mother         MYLENOMA    Heart  attack Father     Prostate cancer Father     Cancer Father         PROSTATE     Heart disease Father                 reports that he has never smoked. He has never used smokeless tobacco. He reports that he does not drink alcohol and does not use drugs.    PHYSICAL EXAM    There were no vitals taken for this visit.    General: normal, cooperative, no distress  Abdominal: soft, nondistended, or nontender  Incision: clean, dry, and intact and healing well      Pepe Woodall MD    Date: 3/4/2024 Time: 10:12 AM

## 2024-03-05 ENCOUNTER — TELEPHONE (OUTPATIENT)
Age: 69
End: 2024-03-05

## 2024-03-05 NOTE — TELEPHONE ENCOUNTER
Patient called in because he forgot to ask at appointment if any follow ups are necessary. Peaked at office note and I do not see any indication of a follow up needed, patient would like to confirm with Dr Woodall. Patient can be reached at [phone].

## 2024-03-09 ENCOUNTER — APPOINTMENT (OUTPATIENT)
Dept: LAB | Facility: CLINIC | Age: 69
End: 2024-03-09
Payer: MEDICARE

## 2024-03-09 DIAGNOSIS — R73.01 IMPAIRED FASTING BLOOD SUGAR: ICD-10-CM

## 2024-03-09 DIAGNOSIS — E78.2 MIXED HYPERLIPIDEMIA: ICD-10-CM

## 2024-03-09 DIAGNOSIS — Z79.899 ENCOUNTER FOR LONG-TERM (CURRENT) USE OF OTHER MEDICATIONS: ICD-10-CM

## 2024-03-09 LAB
25(OH)D3 SERPL-MCNC: 15.4 NG/ML (ref 30–100)
ALBUMIN SERPL BCP-MCNC: 4.2 G/DL (ref 3.5–5)
ALP SERPL-CCNC: 103 U/L (ref 34–104)
ALT SERPL W P-5'-P-CCNC: 15 U/L (ref 7–52)
ANION GAP SERPL CALCULATED.3IONS-SCNC: 6 MMOL/L
AST SERPL W P-5'-P-CCNC: 15 U/L (ref 13–39)
BILIRUB SERPL-MCNC: 0.48 MG/DL (ref 0.2–1)
BUN SERPL-MCNC: 18 MG/DL (ref 5–25)
CALCIUM SERPL-MCNC: 9.6 MG/DL (ref 8.4–10.2)
CHLORIDE SERPL-SCNC: 106 MMOL/L (ref 96–108)
CO2 SERPL-SCNC: 30 MMOL/L (ref 21–32)
CREAT SERPL-MCNC: 1.12 MG/DL (ref 0.6–1.3)
EST. AVERAGE GLUCOSE BLD GHB EST-MCNC: 143 MG/DL
GFR SERPL CREATININE-BSD FRML MDRD: 66 ML/MIN/1.73SQ M
GLUCOSE P FAST SERPL-MCNC: 118 MG/DL (ref 65–99)
HBA1C MFR BLD: 6.6 %
POTASSIUM SERPL-SCNC: 4.7 MMOL/L (ref 3.5–5.3)
PROT SERPL-MCNC: 6.6 G/DL (ref 6.4–8.4)
SODIUM SERPL-SCNC: 142 MMOL/L (ref 135–147)
TRIGL SERPL-MCNC: 109 MG/DL

## 2024-03-09 PROCEDURE — 83036 HEMOGLOBIN GLYCOSYLATED A1C: CPT

## 2024-03-09 PROCEDURE — 82306 VITAMIN D 25 HYDROXY: CPT

## 2024-03-09 PROCEDURE — 80053 COMPREHEN METABOLIC PANEL: CPT

## 2024-03-09 PROCEDURE — 36415 COLL VENOUS BLD VENIPUNCTURE: CPT

## 2024-03-09 PROCEDURE — 84478 ASSAY OF TRIGLYCERIDES: CPT

## 2024-03-12 ENCOUNTER — NURSE TRIAGE (OUTPATIENT)
Age: 69
End: 2024-03-12

## 2024-03-12 NOTE — TELEPHONE ENCOUNTER
"Pt warm transferred from [place] to this CTS-RN. Pt concerned about new pain at L inguinal hernia incision site after lifting garage door over the weekend. Pt endorses \"annoying\" \"constant\" 3-5/10 pain, relived by laying down. Pt has not tried OTC medication. Pt denied hearing or feeling pop, denies new hernia bulge, denies issues with having BM or passing gas. Reports incision is closed, but \"hard.\" Pt very anxious on phone.    Reason for Disposition   INCREASING pain in incision and over 2 days (48 hours) since surgery    Answer Assessment - Initial Assessment Questions  1. SYMPTOM: \"What's the main symptom you're concerned about?\" (e.g., redness, pain, drainage)      New pain at L inguinal hernia incision  2. ONSET: \"When did pain  start?\"      Over this past weekend  3. SURGERY: \"What surgery was performed?\"      Procedures:      REPAIR HERNIA INGUINAL (Left: Groin)      REPAIR HERNIA UMBILICAL (Abdomen)   4. DATE of SURGERY: \"When was surgery performed?\"       2/16/24  5. INCISION SITE: \"Where is the incision located?\"       L groin  6. REDNESS: \"Is there any redness at the incision site?\" If yes, ask: \"How wide across is the redness?\" (Inches, centimeters)       denies  7. PAIN: \"Is there any pain?\" If Yes, ask: \"How bad is it?\"  (Scale 1-10; or mild, moderate, severe)      denies  8. BLEEDING: \"Is there any bleeding?\" If Yes, ask: \"How much?\" and \"Where?\"      denies  9. DRAINAGE: \"Is there any drainage from the incision site?\" If yes, ask: \"What color and how much?\" (e.g., red, cloudy, pus; drops, teaspoon)      denies  10. FEVER: \"Do you have a fever?\" If Yes, ask: \"What is your temperature, how was it measured, and when did it start?\"        denies  11. OTHER SYMPTOMS: \"Do you have any other symptoms?\" (e.g., shaking chills, weakness, rash elsewhere on body)        denies    Protocols used: Post-Op Incision Symptoms and Questions-ADULT-[place]    "

## 2024-04-09 ENCOUNTER — APPOINTMENT (OUTPATIENT)
Dept: LAB | Facility: CLINIC | Age: 69
End: 2024-04-09
Payer: MEDICARE

## 2024-04-09 DIAGNOSIS — Z79.899 ENCOUNTER FOR LONG-TERM (CURRENT) USE OF OTHER MEDICATIONS: ICD-10-CM

## 2024-04-09 DIAGNOSIS — F33.1 MAJOR DEPRESSIVE DISORDER, RECURRENT EPISODE, MODERATE (HCC): ICD-10-CM

## 2024-04-09 DIAGNOSIS — F90.2 ATTENTION DEFICIT HYPERACTIVITY DISORDER, COMBINED TYPE: ICD-10-CM

## 2024-04-09 DIAGNOSIS — E88.89 MADELUNG'S NECK (HCC): ICD-10-CM

## 2024-04-09 LAB
25(OH)D3 SERPL-MCNC: 21.9 NG/ML (ref 30–100)
ALBUMIN SERPL BCP-MCNC: 4.3 G/DL (ref 3.5–5)
ALP SERPL-CCNC: 92 U/L (ref 34–104)
ALT SERPL W P-5'-P-CCNC: 22 U/L (ref 7–52)
ANION GAP SERPL CALCULATED.3IONS-SCNC: 5 MMOL/L (ref 4–13)
AST SERPL W P-5'-P-CCNC: 18 U/L (ref 13–39)
BASOPHILS # BLD AUTO: 0.05 THOUSANDS/ÂΜL (ref 0–0.1)
BASOPHILS NFR BLD AUTO: 1 % (ref 0–1)
BILIRUB SERPL-MCNC: 0.5 MG/DL (ref 0.2–1)
BUN SERPL-MCNC: 15 MG/DL (ref 5–25)
CALCIUM SERPL-MCNC: 9.6 MG/DL (ref 8.4–10.2)
CHLORIDE SERPL-SCNC: 104 MMOL/L (ref 96–108)
CHOLEST SERPL-MCNC: 263 MG/DL
CO2 SERPL-SCNC: 31 MMOL/L (ref 21–32)
CREAT SERPL-MCNC: 1.07 MG/DL (ref 0.6–1.3)
EOSINOPHIL # BLD AUTO: 0.2 THOUSAND/ÂΜL (ref 0–0.61)
EOSINOPHIL NFR BLD AUTO: 4 % (ref 0–6)
ERYTHROCYTE [DISTWIDTH] IN BLOOD BY AUTOMATED COUNT: 12.4 % (ref 11.6–15.1)
EST. AVERAGE GLUCOSE BLD GHB EST-MCNC: 143 MG/DL
FOLATE SERPL-MCNC: 13.2 NG/ML
GFR SERPL CREATININE-BSD FRML MDRD: 70 ML/MIN/1.73SQ M
GLUCOSE P FAST SERPL-MCNC: 106 MG/DL (ref 65–99)
HBA1C MFR BLD: 6.6 %
HCT VFR BLD AUTO: 48.1 % (ref 36.5–49.3)
HDLC SERPL-MCNC: 48 MG/DL
HGB BLD-MCNC: 15.7 G/DL (ref 12–17)
IMM GRANULOCYTES # BLD AUTO: 0.01 THOUSAND/UL (ref 0–0.2)
IMM GRANULOCYTES NFR BLD AUTO: 0 % (ref 0–2)
LDLC SERPL CALC-MCNC: 179 MG/DL (ref 0–100)
LYMPHOCYTES # BLD AUTO: 1.48 THOUSANDS/ÂΜL (ref 0.6–4.47)
LYMPHOCYTES NFR BLD AUTO: 32 % (ref 14–44)
MCH RBC QN AUTO: 28.7 PG (ref 26.8–34.3)
MCHC RBC AUTO-ENTMCNC: 32.6 G/DL (ref 31.4–37.4)
MCV RBC AUTO: 88 FL (ref 82–98)
MONOCYTES # BLD AUTO: 0.33 THOUSAND/ÂΜL (ref 0.17–1.22)
MONOCYTES NFR BLD AUTO: 7 % (ref 4–12)
NEUTROPHILS # BLD AUTO: 2.6 THOUSANDS/ÂΜL (ref 1.85–7.62)
NEUTS SEG NFR BLD AUTO: 56 % (ref 43–75)
NRBC BLD AUTO-RTO: 0 /100 WBCS
PLATELET # BLD AUTO: 246 THOUSANDS/UL (ref 149–390)
PMV BLD AUTO: 10.2 FL (ref 8.9–12.7)
POTASSIUM SERPL-SCNC: 4.2 MMOL/L (ref 3.5–5.3)
PROT SERPL-MCNC: 6.8 G/DL (ref 6.4–8.4)
PSA SERPL-MCNC: 2.67 NG/ML (ref 0–4)
RBC # BLD AUTO: 5.47 MILLION/UL (ref 3.88–5.62)
SODIUM SERPL-SCNC: 140 MMOL/L (ref 135–147)
TRIGL SERPL-MCNC: 182 MG/DL
TSH SERPL DL<=0.05 MIU/L-ACNC: 1.81 UIU/ML (ref 0.45–4.5)
VIT B12 SERPL-MCNC: 279 PG/ML (ref 180–914)
WBC # BLD AUTO: 4.67 THOUSAND/UL (ref 4.31–10.16)

## 2024-04-09 PROCEDURE — 80061 LIPID PANEL: CPT

## 2024-04-09 PROCEDURE — 82306 VITAMIN D 25 HYDROXY: CPT

## 2024-04-09 PROCEDURE — 82746 ASSAY OF FOLIC ACID SERUM: CPT

## 2024-04-09 PROCEDURE — 83036 HEMOGLOBIN GLYCOSYLATED A1C: CPT

## 2024-04-09 PROCEDURE — 85025 COMPLETE CBC W/AUTO DIFF WBC: CPT

## 2024-04-09 PROCEDURE — 80335 ANTIDEPRESSANT TRICYCLIC 1/2: CPT

## 2024-04-09 PROCEDURE — 84443 ASSAY THYROID STIM HORMONE: CPT

## 2024-04-09 PROCEDURE — 82607 VITAMIN B-12: CPT

## 2024-04-09 PROCEDURE — 80053 COMPREHEN METABOLIC PANEL: CPT

## 2024-04-09 PROCEDURE — 36415 COLL VENOUS BLD VENIPUNCTURE: CPT

## 2024-04-12 LAB — MISCELLANEOUS LAB TEST RESULT: NORMAL

## 2024-05-08 PROBLEM — R29.818 SUSPECTED SLEEP APNEA: Status: ACTIVE | Noted: 2024-05-08

## 2024-05-08 PROBLEM — G47.9 SLEEP DISTURBANCE: Status: ACTIVE | Noted: 2024-05-08

## 2024-05-08 PROBLEM — G47.61 PERIODIC LIMB MOVEMENT: Status: ACTIVE | Noted: 2024-05-08

## 2024-05-08 PROBLEM — G25.81 RESTLESS LEG: Status: ACTIVE | Noted: 2024-05-08

## 2024-05-08 PROBLEM — G47.19 EXCESSIVE DAYTIME SLEEPINESS: Status: ACTIVE | Noted: 2024-05-08

## 2024-05-10 ENCOUNTER — RA CDI HCC (OUTPATIENT)
Dept: OTHER | Facility: HOSPITAL | Age: 69
End: 2024-05-10

## 2024-05-14 ENCOUNTER — APPOINTMENT (OUTPATIENT)
Dept: LAB | Facility: CLINIC | Age: 69
End: 2024-05-14
Payer: MEDICARE

## 2024-05-14 DIAGNOSIS — G47.61 PERIODIC LIMB MOVEMENT: ICD-10-CM

## 2024-05-14 DIAGNOSIS — D50.9 IRON DEFICIENCY ANEMIA, UNSPECIFIED IRON DEFICIENCY ANEMIA TYPE: ICD-10-CM

## 2024-05-14 DIAGNOSIS — G47.19 EXCESSIVE DAYTIME SLEEPINESS: ICD-10-CM

## 2024-05-14 LAB — FERRITIN SERPL-MCNC: 200 NG/ML (ref 24–336)

## 2024-05-14 PROCEDURE — 82728 ASSAY OF FERRITIN: CPT

## 2024-05-14 PROCEDURE — 36415 COLL VENOUS BLD VENIPUNCTURE: CPT

## 2024-05-17 ENCOUNTER — OFFICE VISIT (OUTPATIENT)
Dept: INTERNAL MEDICINE CLINIC | Facility: CLINIC | Age: 69
End: 2024-05-17
Payer: MEDICARE

## 2024-05-17 VITALS
TEMPERATURE: 97.9 F | SYSTOLIC BLOOD PRESSURE: 122 MMHG | DIASTOLIC BLOOD PRESSURE: 80 MMHG | HEART RATE: 90 BPM | OXYGEN SATURATION: 96 % | BODY MASS INDEX: 25.58 KG/M2 | WEIGHT: 163 LBS | HEIGHT: 67 IN

## 2024-05-17 DIAGNOSIS — E53.8 B12 DEFICIENCY: ICD-10-CM

## 2024-05-17 DIAGNOSIS — Z23 ENCOUNTER FOR IMMUNIZATION: ICD-10-CM

## 2024-05-17 DIAGNOSIS — E78.2 MIXED HYPERLIPIDEMIA: ICD-10-CM

## 2024-05-17 DIAGNOSIS — E11.9 TYPE 2 DIABETES, HBA1C GOAL < 7% (HCC): Primary | ICD-10-CM

## 2024-05-17 DIAGNOSIS — F33.9 DEPRESSION, RECURRENT (HCC): ICD-10-CM

## 2024-05-17 DIAGNOSIS — E55.9 VITAMIN D DEFICIENCY: ICD-10-CM

## 2024-05-17 PROCEDURE — G0009 ADMIN PNEUMOCOCCAL VACCINE: HCPCS

## 2024-05-17 PROCEDURE — 90677 PCV20 VACCINE IM: CPT

## 2024-05-17 PROCEDURE — 99214 OFFICE O/P EST MOD 30 MIN: CPT | Performed by: INTERNAL MEDICINE

## 2024-05-17 RX ORDER — LANOLIN ALCOHOL/MO/W.PET/CERES
1000 CREAM (GRAM) TOPICAL DAILY
Qty: 90 TABLET | Refills: 1 | Status: SHIPPED | OUTPATIENT
Start: 2024-05-17

## 2024-05-17 RX ORDER — SIMVASTATIN 10 MG
10 TABLET ORAL
Qty: 90 TABLET | Refills: 1 | Status: SHIPPED | OUTPATIENT
Start: 2024-05-17

## 2024-05-17 NOTE — PROGRESS NOTES
Assessment/Plan:             1. Type 2 diabetes, HbA1c goal < 7% ([facility])  Comments:  would like to try diet and exercise  Orders:  -     Albumin / creatinine urine ratio; Future  -     CBC and differential; Future  -     Comprehensive metabolic panel; Future  -     Lipid Panel with Direct LDL reflex; Future  -     TSH, 3rd generation; Future  -     Hemoglobin A1C; Future  2. Mixed hyperlipidemia  Comments:  restart simvstatin  Orders:  -     simvastatin (ZOCOR) 10 mg tablet; Take 1 tablet (10 mg total) by mouth daily at bedtime  -     Comprehensive metabolic panel; Future  -     Lipid Panel with Direct LDL reflex; Future  -     TSH, 3rd generation; Future  3. Depression, recurrent ([facility])  Comments:  continue same med  4. B12 deficiency  -     vitamin B-12 (VITAMIN B-12) 1,000 mcg tablet; Take 1 tablet (1,000 mcg total) by mouth daily  5. Vitamin D deficiency  -     Cholecalciferol (VITAMIN D3) 1,000 units tablet; Take 1 tablet (1,000 Units total) by mouth daily         Subjective:      Patient ID: Juan Lucero is a 68 y.o. male.    Follow-up on multiple medical problems to ensure they are stable on current medication, blood test done in  April discussed with him        The following portions of the patient's history were reviewed and updated as appropriate: He  has a past medical history of Anxiety, Depression, GERD (gastroesophageal reflux disease), Hyperlipidemia, Kidney stone, Polymyalgia rheumatica syndrome (HCC), and Psychiatric disorder.  He   Patient Active Problem List    Diagnosis Date Noted    Type 2 diabetes, HbA1c goal < 7% ([facility]) 05/17/2024    Excessive daytime sleepiness 05/08/2024    Sleep disturbance 05/08/2024    Suspected sleep apnea 05/08/2024    Restless leg 05/08/2024    Periodic limb movement 05/08/2024    Temporal arteritis (HCC) 02/02/2024    Left inguinal hernia 11/13/2023    Umbilical hernia without obstruction and without gangrene 11/03/2023    Gastroesophageal reflux disease without  esophagitis 11/03/2023    Depression, recurrent (HCC) 05/23/2023    Preop exam for internal medicine 07/20/2022    Polymyalgia rheumatica syndrome (HCC) 05/18/2022    Impaired fasting blood sugar 05/18/2022    Other chest pain 11/17/2020    Pain in left knee 05/23/2018    Left facial numbness 12/18/2016    History of temporal arteritis 12/18/2016    HTN (hypertension) 12/18/2016    Mixed hyperlipidemia 12/18/2016    Acute nonintractable headache 12/18/2016    Obsessive compulsive disorder 12/18/2016    Anxiety 12/18/2016    ADH disorder 12/18/2016     He  has a past surgical history that includes Appendectomy; Tonsillectomy; Kidney stone surgery; Transurethral resection of prostate; [place] tooth extraction; Colonoscopy; pr rpr 1st ingun hrna age 4 yrs/> reducible (Left, 2/16/2024); and pr rpr aa hernia 1st < 3 cm reducible (N/A, 2/16/2024).  His family history includes Cancer in his father and mother; Diabetes in his mother; Heart attack in his father; Heart disease in his father; Prostate cancer in his father.  He  reports that he has never smoked. He has never used smokeless tobacco. He reports that he does not drink alcohol and does not use drugs.  Current Outpatient Medications   Medication Sig Dispense Refill    Cholecalciferol (VITAMIN D3) 1,000 units tablet Take 1 tablet (1,000 Units total) by mouth daily 90 tablet 2    clonazePAM (KlonoPIN) 0.5 mg tablet Take 0.5 mg by mouth daily at bedtime .5 mg once at night      QUEtiapine (SEROquel) 50 mg tablet Take 50 mg by mouth daily at bedtime      simvastatin (ZOCOR) 10 mg tablet Take 1 tablet (10 mg total) by mouth daily at bedtime 90 tablet 1    vilazodone (VIIBRYD) 40 mg tablet Take 40 mg by mouth daily with breakfast      vitamin B-12 (VITAMIN B-12) 1,000 mcg tablet Take 1 tablet (1,000 mcg total) by mouth daily 90 tablet 1    methotrexate 2.5 MG tablet Take by mouth 5 tablets on Sundays (Patient not taking: Reported on 5/17/2024)      pantoprazole (PROTONIX)  40 mg tablet Take 1 tablet (40 mg total) by mouth daily before breakfast (Patient not taking: Reported on 5/17/2024) 30 tablet 0     No current facility-administered medications for this visit.     Current Outpatient Medications on File Prior to Visit   Medication Sig    clonazePAM (KlonoPIN) 0.5 mg tablet Take 0.5 mg by mouth daily at bedtime .5 mg once at night    QUEtiapine (SEROquel) 50 mg tablet Take 50 mg by mouth daily at bedtime    vilazodone (VIIBRYD) 40 mg tablet Take 40 mg by mouth daily with breakfast    methotrexate 2.5 MG tablet Take by mouth 5 tablets on Sundays (Patient not taking: Reported on 5/17/2024)    pantoprazole (PROTONIX) 40 mg tablet Take 1 tablet (40 mg total) by mouth daily before breakfast (Patient not taking: Reported on 5/17/2024)    [DISCONTINUED] clomiPRAMINE (ANAFRANIL) 25 mg capsule Take 25 mg by mouth daily at bedtime (Patient not taking: Reported on 5/17/2024)    [DISCONTINUED] simvastatin (ZOCOR) 10 mg tablet Take 1 tablet (10 mg total) by mouth daily at bedtime (Patient not taking: Reported on 5/17/2024)     No current facility-administered medications on file prior to visit.     He is allergic to tamsulosin..    Review of Systems   Constitutional:  Negative for chills and fever.   HENT:  Negative for congestion, ear pain and sore throat.    Eyes:  Negative for pain.   Respiratory:  Negative for cough and shortness of breath.    Cardiovascular:  Negative for chest pain and leg swelling.   Gastrointestinal:  Negative for abdominal pain, nausea and vomiting.   Endocrine: Negative for polyuria.   Genitourinary:  Negative for difficulty urinating, frequency and urgency.   Musculoskeletal:  Positive for arthralgias. Negative for back pain.   Skin:  Negative for rash.   Neurological:  Negative for weakness and headaches.   Psychiatric/Behavioral:  Negative for sleep disturbance. The patient is not nervous/anxious.          Objective:      /80 (BP Location: Left arm, Patient  "Position: Sitting, Cuff Size: Standard)   Pulse 90   Temp 97.9 °F (36.6 °C) (Temporal)   Ht 5' 7\" (1.702 m)   Wt 73.9 kg (163 lb)   SpO2 96%   BMI 25.53 kg/m²     Recent Results (from the past 1344 hour(s))   PSA Total, Diagnostic    Collection Time: 04/09/24 10:36 AM   Result Value Ref Range    PSA, Diagnostic 2.67 0.00 - 4.00 ng/mL   CBC and differential    Collection Time: 04/09/24 10:36 AM   Result Value Ref Range    WBC 4.67 4.31 - 10.16 Thousand/uL    RBC 5.47 3.88 - 5.62 Million/uL    Hemoglobin 15.7 12.0 - 17.0 g/dL    Hematocrit 48.1 36.5 - 49.3 %    MCV 88 82 - 98 fL    MCH 28.7 26.8 - 34.3 pg    MCHC 32.6 31.4 - 37.4 g/dL    RDW 12.4 11.6 - 15.1 %    MPV 10.2 8.9 - 12.7 fL    Platelets 246 149 - 390 Thousands/uL    nRBC 0 /100 WBCs    Segmented % 56 43 - 75 %    Immature Grans % 0 0 - 2 %    Lymphocytes % 32 14 - 44 %    Monocytes % 7 4 - 12 %    Eosinophils Relative 4 0 - 6 %    Basophils Relative 1 0 - 1 %    Absolute Neutrophils 2.60 1.85 - 7.62 Thousands/µL    Absolute Immature Grans 0.01 0.00 - 0.20 Thousand/uL    Absolute Lymphocytes 1.48 0.60 - 4.47 Thousands/µL    Absolute Monocytes 0.33 0.17 - 1.22 Thousand/µL    Eosinophils Absolute 0.20 0.00 - 0.61 Thousand/µL    Basophils Absolute 0.05 0.00 - 0.10 Thousands/µL   Comprehensive metabolic panel    Collection Time: 04/09/24 10:36 AM   Result Value Ref Range    Sodium 140 135 - 147 mmol/L    Potassium 4.2 3.5 - 5.3 mmol/L    Chloride 104 96 - 108 mmol/L    CO2 31 21 - 32 mmol/L    ANION GAP 5 4 - 13 mmol/L    BUN 15 5 - 25 mg/dL    Creatinine 1.07 0.60 - 1.30 mg/dL    Glucose, Fasting 106 (H) 65 - 99 mg/dL    Calcium 9.6 8.4 - 10.2 mg/dL    AST 18 13 - 39 U/L    ALT 22 7 - 52 U/L    Alkaline Phosphatase 92 34 - 104 U/L    Total Protein 6.8 6.4 - 8.4 g/dL    Albumin 4.3 3.5 - 5.0 g/dL    Total Bilirubin 0.50 0.20 - 1.00 mg/dL    eGFR 70 ml/min/1.73sq m   Lipid Panel with Direct LDL reflex    Collection Time: 04/09/24 10:36 AM   Result Value " Ref Range    Cholesterol 263 (H) See Comment mg/dL    Triglycerides 182 (H) See Comment mg/dL    HDL, Direct 48 >=40 mg/dL    LDL Calculated 179 (H) 0 - 100 mg/dL   TSH, 3rd generation    Collection Time: 04/09/24 10:36 AM   Result Value Ref Range    TSH 3RD GENERATON 1.808 0.450 - 4.500 uIU/mL   Hemoglobin A1C    Collection Time: 04/09/24 10:36 AM   Result Value Ref Range    Hemoglobin A1C 6.6 (H) Normal 4.0-5.6%; PreDiabetic 5.7-6.4%; Diabetic >=6.5%; Glycemic control for adults with diabetes <7.0% %     mg/dl   Vitamin D 25 hydroxy    Collection Time: 04/09/24 10:36 AM   Result Value Ref Range    Vit D, 25-Hydroxy 21.9 (L) 30.0 - 100.0 ng/mL   Vitamin B12    Collection Time: 04/09/24 10:36 AM   Result Value Ref Range    Vitamin B-12 279 180 - 914 pg/mL   Folate    Collection Time: 04/09/24 10:36 AM   Result Value Ref Range    Folate 13.2 >5.9 ng/mL   MISCELLANEOUS LAB TEST    Collection Time: 04/09/24 10:36 AM   Result Value Ref Range    Miscellaneous Lab Test Result SEE WRITTEN REPORT    Ferritin    Collection Time: 05/14/24  9:01 AM   Result Value Ref Range    Ferritin 200 24 - 336 ng/mL        Physical Exam  Constitutional:       Appearance: Normal appearance.   HENT:      Head: Normocephalic.      Right Ear: External ear normal.      Left Ear: External ear normal.      Nose: Nose normal. No congestion.      Mouth/Throat:      Mouth: Mucous membranes are moist.      Pharynx: Oropharynx is clear. No oropharyngeal exudate or posterior oropharyngeal erythema.   Eyes:      Extraocular Movements: Extraocular movements intact.      Conjunctiva/sclera: Conjunctivae normal.   Cardiovascular:      Rate and Rhythm: Normal rate and regular rhythm.      Heart sounds: Normal heart sounds. No murmur heard.  Pulmonary:      Effort: Pulmonary effort is normal.      Breath sounds: Normal breath sounds. No wheezing or rales.   Abdominal:      General: Abdomen is flat. There is no distension.      Palpations: Abdomen is  soft.      Tenderness: There is no abdominal tenderness.   Musculoskeletal:         General: Normal range of motion.      Cervical back: Normal range of motion and neck supple.      Right lower leg: No edema.      Left lower leg: No edema.   Lymphadenopathy:      Cervical: No cervical adenopathy.   Skin:     General: Skin is warm.   Neurological:      General: No focal deficit present.      Mental Status: He is alert and oriented to person, place, and time.

## 2024-06-11 PROBLEM — G47.33 OSA (OBSTRUCTIVE SLEEP APNEA): Status: ACTIVE | Noted: 2024-06-11

## 2024-06-19 ENCOUNTER — OFFICE VISIT (OUTPATIENT)
Dept: INTERNAL MEDICINE CLINIC | Facility: CLINIC | Age: 69
End: 2024-06-19
Payer: MEDICARE

## 2024-06-19 VITALS
WEIGHT: 163 LBS | DIASTOLIC BLOOD PRESSURE: 70 MMHG | OXYGEN SATURATION: 96 % | SYSTOLIC BLOOD PRESSURE: 116 MMHG | BODY MASS INDEX: 25.58 KG/M2 | TEMPERATURE: 98 F | HEIGHT: 67 IN | HEART RATE: 83 BPM

## 2024-06-19 DIAGNOSIS — E78.2 MIXED HYPERLIPIDEMIA: ICD-10-CM

## 2024-06-19 DIAGNOSIS — G43.009 MIGRAINE WITHOUT AURA AND WITHOUT STATUS MIGRAINOSUS, NOT INTRACTABLE: Primary | ICD-10-CM

## 2024-06-19 DIAGNOSIS — G47.33 OSA (OBSTRUCTIVE SLEEP APNEA): ICD-10-CM

## 2024-06-19 DIAGNOSIS — K21.9 GASTROESOPHAGEAL REFLUX DISEASE WITHOUT ESOPHAGITIS: ICD-10-CM

## 2024-06-19 DIAGNOSIS — E11.9 TYPE 2 DIABETES, HBA1C GOAL < 7% (HCC): ICD-10-CM

## 2024-06-19 DIAGNOSIS — F33.9 DEPRESSION, RECURRENT (HCC): ICD-10-CM

## 2024-06-19 PROCEDURE — G2211 COMPLEX E/M VISIT ADD ON: HCPCS | Performed by: INTERNAL MEDICINE

## 2024-06-19 PROCEDURE — 99214 OFFICE O/P EST MOD 30 MIN: CPT | Performed by: INTERNAL MEDICINE

## 2024-06-19 RX ORDER — DESVENLAFAXINE 25 MG/1
25 TABLET, EXTENDED RELEASE ORAL DAILY
COMMUNITY
Start: 2024-06-03

## 2024-06-19 NOTE — PROGRESS NOTES
Assessment/Plan:      Depression Screening and Follow-up Plan: Patient was screened for depression during today's encounter. They screened negative with a PHQ-9 score of 0.            1. Migraine without aura and without status migrainosus, not intractable  Comments:  Advised him to discuss with Dr. Armenta, psychiatrist about Pristiq medication, see higher dose which can be given, to help with the migraine also  2. Type 2 diabetes, HbA1c goal < 7% ([facility])  Comments:  diet and exercise discussed  3. Mixed hyperlipidemia  Comments:  Continue same med  4. Depression, recurrent ([facility])  5. Gastroesophageal reflux disease without esophagitis  6. GONZALEZ (obstructive sleep apnea)  Comments:  continue cpap         Subjective:      Patient ID: Juan Lucero is a 68 y.o. male.    Headache, left side, wakes up with it, and then gets worse later in the day, going to start using CPAP machine, for sleep apnea,    Headache      The following portions of the patient's history were reviewed and updated as appropriate: He  has a past medical history of Anxiety, Depression, GERD (gastroesophageal reflux disease), Hyperlipidemia, Kidney stone, Polymyalgia rheumatica syndrome (HCC), and Psychiatric disorder.  He   Patient Active Problem List    Diagnosis Date Noted    GONZALEZ (obstructive sleep apnea) 06/11/2024    Type 2 diabetes, HbA1c goal < 7% ([facility]) 05/17/2024    Excessive daytime sleepiness 05/08/2024    Sleep disturbance 05/08/2024    Suspected sleep apnea 05/08/2024    Periodic limb movement 05/08/2024    Temporal arteritis (HCC) 02/02/2024    Left inguinal hernia 11/13/2023    Umbilical hernia without obstruction and without gangrene 11/03/2023    Gastroesophageal reflux disease without esophagitis 11/03/2023    Depression, recurrent (HCC) 05/23/2023    Preop exam for internal medicine 07/20/2022    Polymyalgia rheumatica syndrome (HCC) 05/18/2022    Impaired fasting blood sugar 05/18/2022    Other chest pain 11/17/2020    Pain  in left knee 05/23/2018    Left facial numbness 12/18/2016    History of temporal arteritis 12/18/2016    HTN (hypertension) 12/18/2016    Mixed hyperlipidemia 12/18/2016    Acute nonintractable headache 12/18/2016    Obsessive compulsive disorder 12/18/2016    Anxiety 12/18/2016    ADH disorder 12/18/2016     He  has a past surgical history that includes Appendectomy; Tonsillectomy; Kidney stone surgery; Transurethral resection of prostate; [place] tooth extraction; Colonoscopy; pr rpr 1st ingun hrna age 4 yrs/> reducible (Left, 2/16/2024); and pr rpr aa hernia 1st < 3 cm reducible (N/A, 2/16/2024).  His family history includes Cancer in his father and mother; Diabetes in his mother; Heart attack in his father; Heart disease in his father; Prostate cancer in his father.  He  reports that he has never smoked. He has never used smokeless tobacco. He reports that he does not drink alcohol and does not use drugs.  Current Outpatient Medications   Medication Sig Dispense Refill    Cholecalciferol (VITAMIN D3) 1,000 units tablet Take 1 tablet (1,000 Units total) by mouth daily 90 tablet 2    clonazePAM (KlonoPIN) 0.5 mg tablet Take 0.5 mg by mouth daily at bedtime .5 mg once at night      QUEtiapine (SEROquel) 50 mg tablet Take 50 mg by mouth daily at bedtime      simvastatin (ZOCOR) 10 mg tablet Take 1 tablet (10 mg total) by mouth daily at bedtime 90 tablet 1    vilazodone (VIIBRYD) 40 mg tablet Take 40 mg by mouth daily with breakfast      vitamin B-12 (VITAMIN B-12) 1,000 mcg tablet Take 1 tablet (1,000 mcg total) by mouth daily 90 tablet 1    Desvenlafaxine Succinate ER 25 MG TB24 Take 25 mg by mouth in the morning (Patient not taking: Reported on 6/19/2024)      methotrexate 2.5 MG tablet Take by mouth 5 tablets on Sundays (Patient not taking: Reported on 5/17/2024)      pantoprazole (PROTONIX) 40 mg tablet Take 1 tablet (40 mg total) by mouth daily before breakfast (Patient not taking: Reported on 5/17/2024) 30  tablet 0     No current facility-administered medications for this visit.     Current Outpatient Medications on File Prior to Visit   Medication Sig    Cholecalciferol (VITAMIN D3) 1,000 units tablet Take 1 tablet (1,000 Units total) by mouth daily    clonazePAM (KlonoPIN) 0.5 mg tablet Take 0.5 mg by mouth daily at bedtime .5 mg once at night    QUEtiapine (SEROquel) 50 mg tablet Take 50 mg by mouth daily at bedtime    simvastatin (ZOCOR) 10 mg tablet Take 1 tablet (10 mg total) by mouth daily at bedtime    vilazodone (VIIBRYD) 40 mg tablet Take 40 mg by mouth daily with breakfast    vitamin B-12 (VITAMIN B-12) 1,000 mcg tablet Take 1 tablet (1,000 mcg total) by mouth daily    Desvenlafaxine Succinate ER 25 MG TB24 Take 25 mg by mouth in the morning (Patient not taking: Reported on 6/19/2024)    methotrexate 2.5 MG tablet Take by mouth 5 tablets on Sundays (Patient not taking: Reported on 5/17/2024)    pantoprazole (PROTONIX) 40 mg tablet Take 1 tablet (40 mg total) by mouth daily before breakfast (Patient not taking: Reported on 5/17/2024)     No current facility-administered medications on file prior to visit.     He is allergic to tamsulosin..    Review of Systems   Constitutional:  Negative for chills and fever.   HENT:  Negative for congestion, ear pain and sore throat.    Eyes:  Negative for pain.   Respiratory:  Negative for cough and shortness of breath.    Cardiovascular:  Negative for chest pain and leg swelling.   Gastrointestinal:  Negative for abdominal pain, nausea and vomiting.   Endocrine: Negative for polyuria.   Genitourinary:  Negative for difficulty urinating, frequency and urgency.   Musculoskeletal:  Negative for arthralgias and back pain.   Skin:  Negative for rash.   Neurological:  Positive for headaches. Negative for weakness.   Psychiatric/Behavioral:  Negative for sleep disturbance. The patient is not nervous/anxious.          Objective:      /70 (BP Location: Left arm, Patient  "Position: Sitting, Cuff Size: Standard)   Pulse 83   Temp 98 °F (36.7 °C) (Temporal)   Ht 5' 7\" (1.702 m)   Wt 73.9 kg (163 lb)   SpO2 96%   BMI 25.53 kg/m²     Recent Results (from the past 1344 hour(s))   Ferritin    Collection Time: 05/14/24  9:01 AM   Result Value Ref Range    Ferritin 200 24 - 336 ng/mL        Physical Exam  Constitutional:       Appearance: Normal appearance.   HENT:      Head: Normocephalic.      Right Ear: External ear normal.      Left Ear: External ear normal.      Nose: Nose normal. No congestion.      Mouth/Throat:      Mouth: Mucous membranes are moist.      Pharynx: Oropharynx is clear. No oropharyngeal exudate or posterior oropharyngeal erythema.   Eyes:      Extraocular Movements: Extraocular movements intact.      Conjunctiva/sclera: Conjunctivae normal.   Cardiovascular:      Rate and Rhythm: Normal rate and regular rhythm.      Heart sounds: Normal heart sounds. No murmur heard.  Pulmonary:      Effort: Pulmonary effort is normal.      Breath sounds: Normal breath sounds. No wheezing or rales.   Abdominal:      General: Abdomen is flat. There is no distension.      Palpations: Abdomen is soft.      Tenderness: There is no abdominal tenderness.   Musculoskeletal:      Cervical back: Normal range of motion and neck supple.      Right lower leg: No edema.      Left lower leg: No edema.   Lymphadenopathy:      Cervical: No cervical adenopathy.   Skin:     General: Skin is warm.   Neurological:      General: No focal deficit present.      Mental Status: He is alert and oriented to person, place, and time.         "

## 2024-07-24 ENCOUNTER — APPOINTMENT (OUTPATIENT)
Dept: LAB | Facility: CLINIC | Age: 69
End: 2024-07-24
Payer: MEDICARE

## 2024-07-24 ENCOUNTER — TELEPHONE (OUTPATIENT)
Dept: UROLOGY | Facility: CLINIC | Age: 69
End: 2024-07-24

## 2024-07-24 DIAGNOSIS — N40.1 BPH WITH OBSTRUCTION/LOWER URINARY TRACT SYMPTOMS: Primary | ICD-10-CM

## 2024-07-24 DIAGNOSIS — E11.9 TYPE 2 DIABETES, HBA1C GOAL < 7% (HCC): ICD-10-CM

## 2024-07-24 DIAGNOSIS — N13.8 BPH WITH OBSTRUCTION/LOWER URINARY TRACT SYMPTOMS: Primary | ICD-10-CM

## 2024-07-24 DIAGNOSIS — E78.2 MIXED HYPERLIPIDEMIA: ICD-10-CM

## 2024-07-24 LAB
ALBUMIN SERPL BCG-MCNC: 4.1 G/DL (ref 3.5–5)
ALP SERPL-CCNC: 88 U/L (ref 34–104)
ALT SERPL W P-5'-P-CCNC: 13 U/L (ref 7–52)
ANION GAP SERPL CALCULATED.3IONS-SCNC: 4 MMOL/L (ref 4–13)
AST SERPL W P-5'-P-CCNC: 15 U/L (ref 13–39)
BASOPHILS # BLD AUTO: 0.04 THOUSANDS/ÂΜL (ref 0–0.1)
BASOPHILS NFR BLD AUTO: 1 % (ref 0–1)
BILIRUB SERPL-MCNC: 0.91 MG/DL (ref 0.2–1)
BUN SERPL-MCNC: 15 MG/DL (ref 5–25)
CALCIUM SERPL-MCNC: 9.4 MG/DL (ref 8.4–10.2)
CHLORIDE SERPL-SCNC: 104 MMOL/L (ref 96–108)
CHOLEST SERPL-MCNC: 198 MG/DL
CO2 SERPL-SCNC: 30 MMOL/L (ref 21–32)
CREAT SERPL-MCNC: 1.11 MG/DL (ref 0.6–1.3)
CREAT UR-MCNC: 125.1 MG/DL
EOSINOPHIL # BLD AUTO: 0.17 THOUSAND/ÂΜL (ref 0–0.61)
EOSINOPHIL NFR BLD AUTO: 4 % (ref 0–6)
ERYTHROCYTE [DISTWIDTH] IN BLOOD BY AUTOMATED COUNT: 12.5 % (ref 11.6–15.1)
EST. AVERAGE GLUCOSE BLD GHB EST-MCNC: 143 MG/DL
GFR SERPL CREATININE-BSD FRML MDRD: 67 ML/MIN/1.73SQ M
GLUCOSE P FAST SERPL-MCNC: 103 MG/DL (ref 65–99)
HBA1C MFR BLD: 6.6 %
HCT VFR BLD AUTO: 44 % (ref 36.5–49.3)
HDLC SERPL-MCNC: 46 MG/DL
HGB BLD-MCNC: 14.7 G/DL (ref 12–17)
IMM GRANULOCYTES # BLD AUTO: 0.01 THOUSAND/UL (ref 0–0.2)
IMM GRANULOCYTES NFR BLD AUTO: 0 % (ref 0–2)
LDLC SERPL CALC-MCNC: 126 MG/DL (ref 0–100)
LYMPHOCYTES # BLD AUTO: 1.56 THOUSANDS/ÂΜL (ref 0.6–4.47)
LYMPHOCYTES NFR BLD AUTO: 35 % (ref 14–44)
MCH RBC QN AUTO: 28.8 PG (ref 26.8–34.3)
MCHC RBC AUTO-ENTMCNC: 33.4 G/DL (ref 31.4–37.4)
MCV RBC AUTO: 86 FL (ref 82–98)
MICROALBUMIN UR-MCNC: 10.5 MG/L
MICROALBUMIN/CREAT 24H UR: 8 MG/G CREATININE (ref 0–30)
MONOCYTES # BLD AUTO: 0.4 THOUSAND/ÂΜL (ref 0.17–1.22)
MONOCYTES NFR BLD AUTO: 9 % (ref 4–12)
NEUTROPHILS # BLD AUTO: 2.34 THOUSANDS/ÂΜL (ref 1.85–7.62)
NEUTS SEG NFR BLD AUTO: 51 % (ref 43–75)
NRBC BLD AUTO-RTO: 0 /100 WBCS
PLATELET # BLD AUTO: 212 THOUSANDS/UL (ref 149–390)
PMV BLD AUTO: 10.1 FL (ref 8.9–12.7)
POTASSIUM SERPL-SCNC: 3.9 MMOL/L (ref 3.5–5.3)
PROT SERPL-MCNC: 6.3 G/DL (ref 6.4–8.4)
RBC # BLD AUTO: 5.11 MILLION/UL (ref 3.88–5.62)
SODIUM SERPL-SCNC: 138 MMOL/L (ref 135–147)
TRIGL SERPL-MCNC: 132 MG/DL
TSH SERPL DL<=0.05 MIU/L-ACNC: 2.77 UIU/ML (ref 0.45–4.5)
WBC # BLD AUTO: 4.52 THOUSAND/UL (ref 4.31–10.16)

## 2024-07-24 PROCEDURE — 80061 LIPID PANEL: CPT

## 2024-07-24 PROCEDURE — 36415 COLL VENOUS BLD VENIPUNCTURE: CPT

## 2024-07-24 PROCEDURE — 83036 HEMOGLOBIN GLYCOSYLATED A1C: CPT

## 2024-07-24 PROCEDURE — 82570 ASSAY OF URINE CREATININE: CPT

## 2024-07-24 PROCEDURE — 80053 COMPREHEN METABOLIC PANEL: CPT

## 2024-07-24 PROCEDURE — 84443 ASSAY THYROID STIM HORMONE: CPT

## 2024-07-24 PROCEDURE — 85025 COMPLETE CBC W/AUTO DIFF WBC: CPT

## 2024-07-24 PROCEDURE — 82043 UR ALBUMIN QUANTITATIVE: CPT

## 2024-07-26 ENCOUNTER — TELEPHONE (OUTPATIENT)
Age: 69
End: 2024-07-26

## 2024-07-26 NOTE — TELEPHONE ENCOUNTER
Pt cancelled AWV and labs still be reviewed by Dr Smith.  Dr Smith will call Pt when lab results are reviewed.

## 2024-07-31 ENCOUNTER — OFFICE VISIT (OUTPATIENT)
Dept: INTERNAL MEDICINE CLINIC | Facility: CLINIC | Age: 69
End: 2024-07-31
Payer: MEDICARE

## 2024-07-31 VITALS
HEART RATE: 77 BPM | TEMPERATURE: 98.2 F | OXYGEN SATURATION: 91 % | SYSTOLIC BLOOD PRESSURE: 113 MMHG | HEIGHT: 67 IN | WEIGHT: 163 LBS | BODY MASS INDEX: 25.58 KG/M2 | DIASTOLIC BLOOD PRESSURE: 67 MMHG

## 2024-07-31 DIAGNOSIS — L23.7 ALLERGIC CONTACT DERMATITIS DUE TO PLANTS, EXCEPT FOOD: ICD-10-CM

## 2024-07-31 DIAGNOSIS — E11.9 TYPE 2 DIABETES, HBA1C GOAL < 7% (HCC): Primary | ICD-10-CM

## 2024-07-31 DIAGNOSIS — E78.2 MIXED HYPERLIPIDEMIA: ICD-10-CM

## 2024-07-31 DIAGNOSIS — E22.2 SYNDROME OF INAPPROPRIATE SECRETION OF ANTIDIURETIC HORMONE (HCC): Chronic | ICD-10-CM

## 2024-07-31 DIAGNOSIS — E53.8 VITAMIN B12 DEFICIENCY: ICD-10-CM

## 2024-07-31 DIAGNOSIS — F33.9 DEPRESSION, RECURRENT (HCC): ICD-10-CM

## 2024-07-31 DIAGNOSIS — M35.3 POLYMYALGIA RHEUMATICA SYNDROME (HCC): ICD-10-CM

## 2024-07-31 DIAGNOSIS — E55.9 VITAMIN D DEFICIENCY: ICD-10-CM

## 2024-07-31 PROCEDURE — G2211 COMPLEX E/M VISIT ADD ON: HCPCS | Performed by: INTERNAL MEDICINE

## 2024-07-31 PROCEDURE — 99214 OFFICE O/P EST MOD 30 MIN: CPT | Performed by: INTERNAL MEDICINE

## 2024-07-31 RX ORDER — METHYLPHENIDATE HYDROCHLORIDE 18 MG/1
18 TABLET ORAL DAILY
COMMUNITY
Start: 2024-07-18 | End: 2024-07-31

## 2024-07-31 RX ORDER — CLOBETASOL PROPIONATE 0.5 MG/G
OINTMENT TOPICAL 2 TIMES DAILY
Qty: 120 G | Refills: 1 | Status: SHIPPED | OUTPATIENT
Start: 2024-07-31

## 2024-07-31 RX ORDER — METHYLPHENIDATE HYDROCHLORIDE 27 MG/1
27 TABLET ORAL DAILY
COMMUNITY

## 2024-07-31 NOTE — PROGRESS NOTES
Assessment/Plan:             1. Type 2 diabetes, HbA1c goal < 7% (HCC)  Comments:  Continue  diet and exercise  2. Depression, recurrent (HCC)  Comments:  Continue same medication  3. ADH disorder  4. Mixed hyperlipidemia  Comments:  Continue same medication  5. Polymyalgia rheumatica syndrome (HCC)  6. Allergic contact dermatitis due to plants, except food  -     clobetasol (TEMOVATE) 0.05 % ointment; Apply topically 2 (two) times a day  7. Vitamin D deficiency  8. Vitamin B12 deficiency         Subjective:      Patient ID: Juan Lucero is a 68 y.o. male.    Follow-up on blood and urine test done on 7/24/2024 test discussed with him        The following portions of the patient's history were reviewed and updated as appropriate: He  has a past medical history of Anxiety, Depression, GERD (gastroesophageal reflux disease), Hyperlipidemia, Kidney stone, Polymyalgia rheumatica syndrome (HCC), and Psychiatric disorder.  He   Patient Active Problem List    Diagnosis Date Noted    GONZALEZ (obstructive sleep apnea) 06/11/2024    Type 2 diabetes, HbA1c goal < 7% (HCC) 05/17/2024    Excessive daytime sleepiness 05/08/2024    Sleep disturbance 05/08/2024    Suspected sleep apnea 05/08/2024    Periodic limb movement 05/08/2024    Temporal arteritis (HCC) 02/02/2024    Left inguinal hernia 11/13/2023    Umbilical hernia without obstruction and without gangrene 11/03/2023    Gastroesophageal reflux disease without esophagitis 11/03/2023    Depression, recurrent (HCC) 05/23/2023    Preop exam for internal medicine 07/20/2022    Polymyalgia rheumatica syndrome (HCC) 05/18/2022    Impaired fasting blood sugar 05/18/2022    Other chest pain 11/17/2020    Pain in left knee 05/23/2018    Left facial numbness 12/18/2016    History of temporal arteritis 12/18/2016    HTN (hypertension) 12/18/2016    Mixed hyperlipidemia 12/18/2016    Acute nonintractable headache 12/18/2016    Obsessive compulsive disorder 12/18/2016    Anxiety  12/18/2016    ADH disorder 12/18/2016     He  has a past surgical history that includes Appendectomy; Tonsillectomy; Kidney stone surgery; Transurethral resection of prostate; [place] tooth extraction; Colonoscopy; pr rpr 1st ingun hrna age 4 yrs/> reducible (Left, 2/16/2024); and pr rpr aa hernia 1st < 3 cm reducible (N/A, 2/16/2024).  His family history includes Cancer in his father and mother; Diabetes in his mother; Heart attack in his father; Heart disease in his father; Prostate cancer in his father.  He  reports that he has never smoked. He has never used smokeless tobacco. He reports that he does not drink alcohol and does not use drugs.  Current Outpatient Medications   Medication Sig Dispense Refill    clobetasol (TEMOVATE) 0.05 % ointment Apply topically 2 (two) times a day 120 g 1    clonazePAM (KlonoPIN) 0.5 mg tablet Take 0.5 mg by mouth daily at bedtime .5 mg once at night      methylphenidate (CONCERTA) 27 MG ER tablet Take 27 mg by mouth daily      QUEtiapine (SEROquel) 50 mg tablet Take 50 mg by mouth daily at bedtime      simvastatin (ZOCOR) 10 mg tablet Take 1 tablet (10 mg total) by mouth daily at bedtime 90 tablet 1    vilazodone (VIIBRYD) 40 mg tablet Take 40 mg by mouth daily with breakfast      Cholecalciferol (VITAMIN D3) 1,000 units tablet Take 1 tablet (1,000 Units total) by mouth daily (Patient not taking: Reported on 7/31/2024) 90 tablet 2    Desvenlafaxine Succinate ER 25 MG TB24 Take 25 mg by mouth in the morning (Patient not taking: Reported on 6/19/2024)      methotrexate 2.5 MG tablet Take by mouth 5 tablets on Sundays (Patient not taking: Reported on 5/17/2024)      pantoprazole (PROTONIX) 40 mg tablet Take 1 tablet (40 mg total) by mouth daily before breakfast (Patient not taking: Reported on 5/17/2024) 30 tablet 0    vitamin B-12 (VITAMIN B-12) 1,000 mcg tablet Take 1 tablet (1,000 mcg total) by mouth daily (Patient not taking: Reported on 7/31/2024) 90 tablet 1     No current  facility-administered medications for this visit.     Current Outpatient Medications on File Prior to Visit   Medication Sig    clonazePAM (KlonoPIN) 0.5 mg tablet Take 0.5 mg by mouth daily at bedtime .5 mg once at night    methylphenidate (CONCERTA) 27 MG ER tablet Take 27 mg by mouth daily    QUEtiapine (SEROquel) 50 mg tablet Take 50 mg by mouth daily at bedtime    simvastatin (ZOCOR) 10 mg tablet Take 1 tablet (10 mg total) by mouth daily at bedtime    vilazodone (VIIBRYD) 40 mg tablet Take 40 mg by mouth daily with breakfast    [DISCONTINUED] methylphenidate (CONCERTA) 18 mg ER tablet Take 18 mg by mouth daily    Cholecalciferol (VITAMIN D3) 1,000 units tablet Take 1 tablet (1,000 Units total) by mouth daily (Patient not taking: Reported on 7/31/2024)    Desvenlafaxine Succinate ER 25 MG TB24 Take 25 mg by mouth in the morning (Patient not taking: Reported on 6/19/2024)    methotrexate 2.5 MG tablet Take by mouth 5 tablets on Sundays (Patient not taking: Reported on 5/17/2024)    pantoprazole (PROTONIX) 40 mg tablet Take 1 tablet (40 mg total) by mouth daily before breakfast (Patient not taking: Reported on 5/17/2024)    vitamin B-12 (VITAMIN B-12) 1,000 mcg tablet Take 1 tablet (1,000 mcg total) by mouth daily (Patient not taking: Reported on 7/31/2024)     No current facility-administered medications on file prior to visit.     He is allergic to tamsulosin..    Review of Systems   Constitutional:  Negative for chills and fever.   HENT:  Negative for congestion, ear pain and sore throat.    Eyes:  Negative for pain.   Respiratory:  Negative for cough and shortness of breath.    Cardiovascular:  Negative for chest pain and leg swelling.   Gastrointestinal:  Negative for abdominal pain, nausea and vomiting.   Endocrine: Negative for polyuria.   Genitourinary:  Negative for difficulty urinating, frequency and urgency.   Musculoskeletal:  Negative for arthralgias and back pain.   Skin:  Negative for rash.  "  Neurological:  Negative for weakness and headaches.   Psychiatric/Behavioral:  Negative for sleep disturbance. The patient is not nervous/anxious.          Objective:      /67 (BP Location: Left arm, Patient Position: Sitting, Cuff Size: Adult)   Pulse 77   Temp 98.2 °F (36.8 °C) (Temporal)   Ht 5' 7\" (1.702 m)   Wt 73.9 kg (163 lb)   SpO2 91%   BMI 25.53 kg/m²     Recent Results (from the past 1344 hour(s))   Albumin / creatinine urine ratio    Collection Time: 07/24/24  8:37 AM   Result Value Ref Range    Creatinine, Ur 125.1 Reference range not established. mg/dL    Albumin,U,Random 10.5 <20.0 mg/L    Albumin Creat Ratio 8 0 - 30 mg/g creatinine   CBC and differential    Collection Time: 07/24/24  8:37 AM   Result Value Ref Range    WBC 4.52 4.31 - 10.16 Thousand/uL    RBC 5.11 3.88 - 5.62 Million/uL    Hemoglobin 14.7 12.0 - 17.0 g/dL    Hematocrit 44.0 36.5 - 49.3 %    MCV 86 82 - 98 fL    MCH 28.8 26.8 - 34.3 pg    MCHC 33.4 31.4 - 37.4 g/dL    RDW 12.5 11.6 - 15.1 %    MPV 10.1 8.9 - 12.7 fL    Platelets 212 149 - 390 Thousands/uL    nRBC 0 /100 WBCs    Segmented % 51 43 - 75 %    Immature Grans % 0 0 - 2 %    Lymphocytes % 35 14 - 44 %    Monocytes % 9 4 - 12 %    Eosinophils Relative 4 0 - 6 %    Basophils Relative 1 0 - 1 %    Absolute Neutrophils 2.34 1.85 - 7.62 Thousands/µL    Absolute Immature Grans 0.01 0.00 - 0.20 Thousand/uL    Absolute Lymphocytes 1.56 0.60 - 4.47 Thousands/µL    Absolute Monocytes 0.40 0.17 - 1.22 Thousand/µL    Eosinophils Absolute 0.17 0.00 - 0.61 Thousand/µL    Basophils Absolute 0.04 0.00 - 0.10 Thousands/µL   Comprehensive metabolic panel    Collection Time: 07/24/24  8:37 AM   Result Value Ref Range    Sodium 138 135 - 147 mmol/L    Potassium 3.9 3.5 - 5.3 mmol/L    Chloride 104 96 - 108 mmol/L    CO2 30 21 - 32 mmol/L    ANION GAP 4 4 - 13 mmol/L    BUN 15 5 - 25 mg/dL    Creatinine 1.11 0.60 - 1.30 mg/dL    Glucose, Fasting 103 (H) 65 - 99 mg/dL    Calcium " 9.4 8.4 - 10.2 mg/dL    AST 15 13 - 39 U/L    ALT 13 7 - 52 U/L    Alkaline Phosphatase 88 34 - 104 U/L    Total Protein 6.3 (L) 6.4 - 8.4 g/dL    Albumin 4.1 3.5 - 5.0 g/dL    Total Bilirubin 0.91 0.20 - 1.00 mg/dL    eGFR 67 ml/min/1.73sq m   Lipid Panel with Direct LDL reflex    Collection Time: 07/24/24  8:37 AM   Result Value Ref Range    Cholesterol 198 See Comment mg/dL    Triglycerides 132 See Comment mg/dL    HDL, Direct 46 >=40 mg/dL    LDL Calculated 126 (H) 0 - 100 mg/dL   TSH, 3rd generation    Collection Time: 07/24/24  8:37 AM   Result Value Ref Range    TSH 3RD GENERATON 2.766 0.450 - 4.500 uIU/mL   Hemoglobin A1C    Collection Time: 07/24/24  8:37 AM   Result Value Ref Range    Hemoglobin A1C 6.6 (H) Normal 4.0-5.6%; PreDiabetic 5.7-6.4%; Diabetic >=6.5%; Glycemic control for adults with diabetes <7.0% %     mg/dl        Physical Exam  Constitutional:       Appearance: Normal appearance.   HENT:      Head: Normocephalic.      Right Ear: Tympanic membrane, ear canal and external ear normal.      Left Ear: Tympanic membrane, ear canal and external ear normal.      Nose: Nose normal. No congestion.      Mouth/Throat:      Mouth: Mucous membranes are moist.      Pharynx: Oropharynx is clear. No oropharyngeal exudate or posterior oropharyngeal erythema.   Eyes:      Extraocular Movements: Extraocular movements intact.      Conjunctiva/sclera: Conjunctivae normal.   Cardiovascular:      Rate and Rhythm: Normal rate and regular rhythm.      Heart sounds: Normal heart sounds. No murmur heard.  Pulmonary:      Effort: Pulmonary effort is normal.      Breath sounds: Normal breath sounds. No wheezing or rales.   Abdominal:      General: Abdomen is flat. There is no distension.      Palpations: Abdomen is soft.      Tenderness: There is no abdominal tenderness.   Musculoskeletal:         General: Normal range of motion.      Cervical back: Normal range of motion and neck supple.      Right lower leg: No  edema.      Left lower leg: No edema.   Lymphadenopathy:      Cervical: No cervical adenopathy.   Skin:     General: Skin is warm.   Neurological:      General: No focal deficit present.      Mental Status: He is alert and oriented to person, place, and time.

## 2024-07-31 NOTE — PROGRESS NOTES
Diabetic Foot Exam    Patient's shoes and socks removed.    Right Foot/Ankle   Right Foot Inspection  Skin Exam: skin normal and skin intact. No dry skin, no warmth, no callus, no erythema, no maceration, no abnormal color, no pre-ulcer, no ulcer and no callus.     Toe Exam: ROM and strength within normal limits. No swelling, no tenderness, erythema and  no right toe deformity    Sensory   Monofilament testing: intact    Vascular  Capillary refills: < 3 seconds  The right DP pulse is 2+. The right PT pulse is 2+.     Left Foot/Ankle  Left Foot Inspection  Skin Exam: skin normal and skin intact. No dry skin, no warmth, no erythema, no maceration, normal color, no pre-ulcer, no ulcer and no callus.     Toe Exam: ROM and strength within normal limits. No swelling, no tenderness, no erythema and no left toe deformity.     Sensory   Monofilament testing: intact    Vascular  Capillary refills: < 3 seconds  The left DP pulse is 2+. The left PT pulse is 2+.     Assign Risk Category  No deformity present  No loss of protective sensation  No weak pulses  Risk: 0

## 2024-08-26 ENCOUNTER — APPOINTMENT (OUTPATIENT)
Dept: LAB | Facility: CLINIC | Age: 69
End: 2024-08-26
Payer: MEDICARE

## 2024-08-26 DIAGNOSIS — M35.3 POLYMYALGIA RHEUMATICA (HCC): ICD-10-CM

## 2024-08-26 DIAGNOSIS — N13.8 BPH WITH OBSTRUCTION/LOWER URINARY TRACT SYMPTOMS: ICD-10-CM

## 2024-08-26 DIAGNOSIS — N40.1 BPH WITH OBSTRUCTION/LOWER URINARY TRACT SYMPTOMS: ICD-10-CM

## 2024-08-26 LAB
ALBUMIN SERPL BCG-MCNC: 4.2 G/DL (ref 3.5–5)
ALP SERPL-CCNC: 91 U/L (ref 34–104)
ALT SERPL W P-5'-P-CCNC: 18 U/L (ref 7–52)
ANION GAP SERPL CALCULATED.3IONS-SCNC: 4 MMOL/L (ref 4–13)
AST SERPL W P-5'-P-CCNC: 19 U/L (ref 13–39)
BASOPHILS # BLD AUTO: 0.05 THOUSANDS/ÂΜL (ref 0–0.1)
BASOPHILS NFR BLD AUTO: 1 % (ref 0–1)
BILIRUB SERPL-MCNC: 0.65 MG/DL (ref 0.2–1)
BUN SERPL-MCNC: 13 MG/DL (ref 5–25)
CALCIUM SERPL-MCNC: 9.1 MG/DL (ref 8.4–10.2)
CHLORIDE SERPL-SCNC: 105 MMOL/L (ref 96–108)
CO2 SERPL-SCNC: 31 MMOL/L (ref 21–32)
CREAT SERPL-MCNC: 1.06 MG/DL (ref 0.6–1.3)
CRP SERPL QL: <1 MG/L
EOSINOPHIL # BLD AUTO: 0.16 THOUSAND/ÂΜL (ref 0–0.61)
EOSINOPHIL NFR BLD AUTO: 4 % (ref 0–6)
ERYTHROCYTE [DISTWIDTH] IN BLOOD BY AUTOMATED COUNT: 12.3 % (ref 11.6–15.1)
ERYTHROCYTE [SEDIMENTATION RATE] IN BLOOD: 3 MM/HOUR (ref 0–19)
GFR SERPL CREATININE-BSD FRML MDRD: 71 ML/MIN/1.73SQ M
GLUCOSE P FAST SERPL-MCNC: 111 MG/DL (ref 65–99)
HCT VFR BLD AUTO: 45.6 % (ref 36.5–49.3)
HGB BLD-MCNC: 14.9 G/DL (ref 12–17)
IMM GRANULOCYTES # BLD AUTO: 0.01 THOUSAND/UL (ref 0–0.2)
IMM GRANULOCYTES NFR BLD AUTO: 0 % (ref 0–2)
LYMPHOCYTES # BLD AUTO: 1.51 THOUSANDS/ÂΜL (ref 0.6–4.47)
LYMPHOCYTES NFR BLD AUTO: 34 % (ref 14–44)
MCH RBC QN AUTO: 28.8 PG (ref 26.8–34.3)
MCHC RBC AUTO-ENTMCNC: 32.7 G/DL (ref 31.4–37.4)
MCV RBC AUTO: 88 FL (ref 82–98)
MONOCYTES # BLD AUTO: 0.37 THOUSAND/ÂΜL (ref 0.17–1.22)
MONOCYTES NFR BLD AUTO: 8 % (ref 4–12)
NEUTROPHILS # BLD AUTO: 2.36 THOUSANDS/ÂΜL (ref 1.85–7.62)
NEUTS SEG NFR BLD AUTO: 53 % (ref 43–75)
NRBC BLD AUTO-RTO: 0 /100 WBCS
PLATELET # BLD AUTO: 213 THOUSANDS/UL (ref 149–390)
PMV BLD AUTO: 10.4 FL (ref 8.9–12.7)
POTASSIUM SERPL-SCNC: 4 MMOL/L (ref 3.5–5.3)
PROT SERPL-MCNC: 6.4 G/DL (ref 6.4–8.4)
RBC # BLD AUTO: 5.18 MILLION/UL (ref 3.88–5.62)
SODIUM SERPL-SCNC: 140 MMOL/L (ref 135–147)
WBC # BLD AUTO: 4.46 THOUSAND/UL (ref 4.31–10.16)

## 2024-08-26 PROCEDURE — 85652 RBC SED RATE AUTOMATED: CPT

## 2024-08-26 PROCEDURE — 36415 COLL VENOUS BLD VENIPUNCTURE: CPT

## 2024-08-26 PROCEDURE — 85025 COMPLETE CBC W/AUTO DIFF WBC: CPT

## 2024-08-26 PROCEDURE — 80053 COMPREHEN METABOLIC PANEL: CPT

## 2024-08-26 PROCEDURE — 86140 C-REACTIVE PROTEIN: CPT

## 2024-08-27 PROBLEM — G47.33 OSA ON CPAP: Status: ACTIVE | Noted: 2024-08-27

## 2024-08-27 PROBLEM — R29.818 SUSPECTED SLEEP APNEA: Status: RESOLVED | Noted: 2024-05-08 | Resolved: 2024-08-27

## 2024-09-03 ENCOUNTER — OFFICE VISIT (OUTPATIENT)
Dept: INTERNAL MEDICINE CLINIC | Facility: CLINIC | Age: 69
End: 2024-09-03
Payer: MEDICARE

## 2024-09-03 ENCOUNTER — TELEPHONE (OUTPATIENT)
Age: 69
End: 2024-09-03

## 2024-09-03 VITALS
HEART RATE: 79 BPM | DIASTOLIC BLOOD PRESSURE: 86 MMHG | WEIGHT: 159 LBS | SYSTOLIC BLOOD PRESSURE: 130 MMHG | TEMPERATURE: 98 F | HEIGHT: 67 IN | OXYGEN SATURATION: 98 % | BODY MASS INDEX: 24.96 KG/M2

## 2024-09-03 DIAGNOSIS — E11.9 DIABETIC EYE EXAM (HCC): Primary | ICD-10-CM

## 2024-09-03 DIAGNOSIS — R10.9 ABDOMINAL PAIN, UNSPECIFIED ABDOMINAL LOCATION: ICD-10-CM

## 2024-09-03 DIAGNOSIS — Z01.00 DIABETIC EYE EXAM (HCC): Primary | ICD-10-CM

## 2024-09-03 PROCEDURE — 99213 OFFICE O/P EST LOW 20 MIN: CPT | Performed by: INTERNAL MEDICINE

## 2024-09-03 PROCEDURE — G2211 COMPLEX E/M VISIT ADD ON: HCPCS | Performed by: INTERNAL MEDICINE

## 2024-09-03 RX ORDER — DICYCLOMINE HYDROCHLORIDE 10 MG/1
10 CAPSULE ORAL
Qty: 30 CAPSULE | Refills: 0 | Status: SHIPPED | OUTPATIENT
Start: 2024-09-03 | End: 2024-09-04 | Stop reason: SDUPTHER

## 2024-09-03 RX ORDER — DICYCLOMINE HYDROCHLORIDE 10 MG/1
10 CAPSULE ORAL
Qty: 10 CAPSULE | Refills: 1 | Status: SHIPPED | OUTPATIENT
Start: 2024-09-03 | End: 2024-09-03 | Stop reason: SDUPTHER

## 2024-09-03 RX ORDER — FAMOTIDINE 20 MG/1
20 TABLET, FILM COATED ORAL 2 TIMES DAILY
COMMUNITY

## 2024-09-03 NOTE — TELEPHONE ENCOUNTER
Patient is calling to follow up on this request, he is still at the pharmacy waiting, I informed patient that provider is seeing patients and will get in touch with the pharmacy as soon as he is available

## 2024-09-03 NOTE — TELEPHONE ENCOUNTER
George called requesting the Dr change the instructions  for   dicyclomine (BENTYL) 10 mg capsule. The directions read   Take 1 capsule (10 mg total) by mouth 3 (three) times a day before meals for 7 days but the dispense quantity is only 10 capsules. Please advise   PT IS WAITING IN PHARMACY

## 2024-09-03 NOTE — PROGRESS NOTES
Assessment/Plan:    Diagnoses and all orders for this visit:    Diabetic eye exam (HCC)  -     Ambulatory Referral to Ophthalmology; Future    Abdominal pain, unspecified abdominal location  -     dicyclomine (BENTYL) 10 mg capsule; Take 1 capsule (10 mg total) by mouth 3 (three) times a day before meals for 7 days    Other orders  -     famotidine (PEPCID) 20 mg tablet; Take 20 mg by mouth 2 (two) times a day       Complains of belly pain/cramping, few loose mushy yellow bowel movements about 3 daily for the last 1 to 2 weeks.  Denies any fever chills or blood in stool  Recent initiation of methylphenidate a month ago  Recent blood work including CMP stable.  Symptoms  could be explained by medication side effect.  Will monitor symptoms given symptomatic treatment and follow-up in 1 week or earlier if needed.       There are no Patient Instructions on file for this visit.    Subjective:      Patient ID: Juna Lucero is a 68 y.o. male    HPI      Current Outpatient Medications:     clobetasol (TEMOVATE) 0.05 % ointment, Apply topically 2 (two) times a day, Disp: 120 g, Rfl: 1    clonazePAM (KlonoPIN) 0.5 mg tablet, Take 0.5 mg by mouth daily at bedtime .5 mg once at night, Disp: , Rfl:     dicyclomine (BENTYL) 10 mg capsule, Take 1 capsule (10 mg total) by mouth 3 (three) times a day before meals for 7 days, Disp: 10 capsule, Rfl: 1    famotidine (PEPCID) 20 mg tablet, Take 20 mg by mouth 2 (two) times a day, Disp: , Rfl:     methylphenidate (CONCERTA) 18 mg ER tablet, Take 18 mg by mouth daily, Disp: , Rfl:     QUEtiapine (SEROquel) 50 mg tablet, Take 50 mg by mouth daily at bedtime, Disp: , Rfl:     simvastatin (ZOCOR) 10 mg tablet, Take 1 tablet (10 mg total) by mouth daily at bedtime, Disp: 90 tablet, Rfl: 1    vilazodone (VIIBRYD) 40 mg tablet, Take 40 mg by mouth daily with breakfast, Disp: , Rfl:     Cholecalciferol (VITAMIN D3) 1,000 units tablet, Take 1 tablet (1,000 Units total) by mouth daily  (Patient not taking: Reported on 7/31/2024), Disp: 90 tablet, Rfl: 2    Desvenlafaxine Succinate ER 25 MG TB24, Take 25 mg by mouth in the morning (Patient not taking: Reported on 6/19/2024), Disp: , Rfl:     methotrexate 2.5 MG tablet, Take by mouth 5 tablets on Sundays (Patient not taking: Reported on 5/17/2024), Disp: , Rfl:     pantoprazole (PROTONIX) 40 mg tablet, Take 1 tablet (40 mg total) by mouth daily before breakfast (Patient not taking: Reported on 5/17/2024), Disp: 30 tablet, Rfl: 0    vitamin B-12 (VITAMIN B-12) 1,000 mcg tablet, Take 1 tablet (1,000 mcg total) by mouth daily (Patient not taking: Reported on 7/31/2024), Disp: 90 tablet, Rfl: 1     Past Medical History:   Diagnosis Date    Anxiety     Depression     GERD (gastroesophageal reflux disease)     Hyperlipidemia     Kidney stone     Polymyalgia rheumatica syndrome (HCC)     Psychiatric disorder     anxiety         Past Surgical History:   Procedure Laterality Date    APPENDECTOMY      COLONOSCOPY      KIDNEY STONE SURGERY      OR RPR 1ST INGUN HRNA AGE 4 YRS/> REDUCIBLE Left 2/16/2024    Procedure: REPAIR HERNIA INGUINAL;  Surgeon: Pepe Woodall MD;  Location: [facility];  Service: General    OR RPR AA HERNIA 1ST < 3 CM REDUCIBLE N/A 2/16/2024    Procedure: REPAIR HERNIA UMBILICAL;  Surgeon: Pepe Woodall MD;  Location: [facility];  Service: General    TONSILLECTOMY      TRANSURETHRAL RESECTION OF PROSTATE      WISDOM TOOTH EXTRACTION           Allergies   Allergen Reactions    Tamsulosin Anxiety     Taking currently with no problems       Recent Results (from the past 1008 hour(s))   Albumin / creatinine urine ratio    Collection Time: 07/24/24  8:37 AM   Result Value Ref Range    Creatinine, Ur 125.1 Reference range not established. mg/dL    Albumin,U,Random 10.5 <20.0 mg/L    Albumin Creat Ratio 8 0 - 30 mg/g creatinine   CBC and differential    Collection Time: 07/24/24  8:37 AM   Result Value Ref Range    WBC 4.52  4.31 - 10.16 Thousand/uL    RBC 5.11 3.88 - 5.62 Million/uL    Hemoglobin 14.7 12.0 - 17.0 g/dL    Hematocrit 44.0 36.5 - 49.3 %    MCV 86 82 - 98 fL    MCH 28.8 26.8 - 34.3 pg    MCHC 33.4 31.4 - 37.4 g/dL    RDW 12.5 11.6 - 15.1 %    MPV 10.1 8.9 - 12.7 fL    Platelets 212 149 - 390 Thousands/uL    nRBC 0 /100 WBCs    Segmented % 51 43 - 75 %    Immature Grans % 0 0 - 2 %    Lymphocytes % 35 14 - 44 %    Monocytes % 9 4 - 12 %    Eosinophils Relative 4 0 - 6 %    Basophils Relative 1 0 - 1 %    Absolute Neutrophils 2.34 1.85 - 7.62 Thousands/µL    Absolute Immature Grans 0.01 0.00 - 0.20 Thousand/uL    Absolute Lymphocytes 1.56 0.60 - 4.47 Thousands/µL    Absolute Monocytes 0.40 0.17 - 1.22 Thousand/µL    Eosinophils Absolute 0.17 0.00 - 0.61 Thousand/µL    Basophils Absolute 0.04 0.00 - 0.10 Thousands/µL   Comprehensive metabolic panel    Collection Time: 07/24/24  8:37 AM   Result Value Ref Range    Sodium 138 135 - 147 mmol/L    Potassium 3.9 3.5 - 5.3 mmol/L    Chloride 104 96 - 108 mmol/L    CO2 30 21 - 32 mmol/L    ANION GAP 4 4 - 13 mmol/L    BUN 15 5 - 25 mg/dL    Creatinine 1.11 0.60 - 1.30 mg/dL    Glucose, Fasting 103 (H) 65 - 99 mg/dL    Calcium 9.4 8.4 - 10.2 mg/dL    AST 15 13 - 39 U/L    ALT 13 7 - 52 U/L    Alkaline Phosphatase 88 34 - 104 U/L    Total Protein 6.3 (L) 6.4 - 8.4 g/dL    Albumin 4.1 3.5 - 5.0 g/dL    Total Bilirubin 0.91 0.20 - 1.00 mg/dL    eGFR 67 ml/min/1.73sq m   Lipid Panel with Direct LDL reflex    Collection Time: 07/24/24  8:37 AM   Result Value Ref Range    Cholesterol 198 See Comment mg/dL    Triglycerides 132 See Comment mg/dL    HDL, Direct 46 >=40 mg/dL    LDL Calculated 126 (H) 0 - 100 mg/dL   TSH, 3rd generation    Collection Time: 07/24/24  8:37 AM   Result Value Ref Range    TSH 3RD GENERATON 2.766 0.450 - 4.500 uIU/mL   Hemoglobin A1C    Collection Time: 07/24/24  8:37 AM   Result Value Ref Range    Hemoglobin A1C 6.6 (H) Normal 4.0-5.6%; PreDiabetic 5.7-6.4%;  Diabetic >=6.5%; Glycemic control for adults with diabetes <7.0% %     mg/dl   CBC and differential    Collection Time: 08/26/24  8:55 AM   Result Value Ref Range    WBC 4.46 4.31 - 10.16 Thousand/uL    RBC 5.18 3.88 - 5.62 Million/uL    Hemoglobin 14.9 12.0 - 17.0 g/dL    Hematocrit 45.6 36.5 - 49.3 %    MCV 88 82 - 98 fL    MCH 28.8 26.8 - 34.3 pg    MCHC 32.7 31.4 - 37.4 g/dL    RDW 12.3 11.6 - 15.1 %    MPV 10.4 8.9 - 12.7 fL    Platelets 213 149 - 390 Thousands/uL    nRBC 0 /100 WBCs    Segmented % 53 43 - 75 %    Immature Grans % 0 0 - 2 %    Lymphocytes % 34 14 - 44 %    Monocytes % 8 4 - 12 %    Eosinophils Relative 4 0 - 6 %    Basophils Relative 1 0 - 1 %    Absolute Neutrophils 2.36 1.85 - 7.62 Thousands/µL    Absolute Immature Grans 0.01 0.00 - 0.20 Thousand/uL    Absolute Lymphocytes 1.51 0.60 - 4.47 Thousands/µL    Absolute Monocytes 0.37 0.17 - 1.22 Thousand/µL    Eosinophils Absolute 0.16 0.00 - 0.61 Thousand/µL    Basophils Absolute 0.05 0.00 - 0.10 Thousands/µL   Comprehensive metabolic panel    Collection Time: 08/26/24  8:55 AM   Result Value Ref Range    Sodium 140 135 - 147 mmol/L    Potassium 4.0 3.5 - 5.3 mmol/L    Chloride 105 96 - 108 mmol/L    CO2 31 21 - 32 mmol/L    ANION GAP 4 4 - 13 mmol/L    BUN 13 5 - 25 mg/dL    Creatinine 1.06 0.60 - 1.30 mg/dL    Glucose, Fasting 111 (H) 65 - 99 mg/dL    Calcium 9.1 8.4 - 10.2 mg/dL    AST 19 13 - 39 U/L    ALT 18 7 - 52 U/L    Alkaline Phosphatase 91 34 - 104 U/L    Total Protein 6.4 6.4 - 8.4 g/dL    Albumin 4.2 3.5 - 5.0 g/dL    Total Bilirubin 0.65 0.20 - 1.00 mg/dL    eGFR 71 ml/min/1.73sq m   C-reactive protein    Collection Time: 08/26/24  8:55 AM   Result Value Ref Range    CRP <1.0 <3.0 mg/L   Sedimentation rate, automated    Collection Time: 08/26/24  8:55 AM   Result Value Ref Range    Sed Rate 3 0 - 19 mm/hour       The following portions of the patient's history were reviewed and updated as appropriate: allergies, current  medications, past family history, past medical history, past social history, past surgical history and problem list.     Review of Systems   Constitutional:  Negative for activity change, appetite change, chills, diaphoresis, fatigue, fever and unexpected weight change.   HENT:  Negative for congestion and sore throat.    Respiratory:  Negative for apnea, cough, choking, chest tightness, shortness of breath, wheezing and stridor.    Cardiovascular:  Negative for chest pain, palpitations and leg swelling.   Gastrointestinal:  Positive for abdominal pain. Negative for abdominal distention, blood in stool, constipation, nausea and rectal pain.   Genitourinary:  Negative for dysuria, flank pain, frequency and urgency.   Musculoskeletal:  Negative for arthralgias, back pain, gait problem, joint swelling and myalgias.   Skin:  Negative for color change, pallor and rash.   Neurological:  Negative for headaches.         Objective:      Vitals:    09/03/24 1416   BP: 130/86   Pulse: 79   Temp: 98 °F (36.7 °C)   SpO2: 98%          Physical Exam  Vitals reviewed.   Constitutional:       General: He is not in acute distress.     Appearance: Normal appearance. He is not ill-appearing, toxic-appearing or diaphoretic.   HENT:      Mouth/Throat:      Mouth: Mucous membranes are moist.   Cardiovascular:      Rate and Rhythm: Normal rate and regular rhythm.      Pulses: Normal pulses.      Heart sounds: Normal heart sounds. No murmur heard.     No friction rub. No gallop.   Pulmonary:      Effort: Pulmonary effort is normal. No respiratory distress.      Breath sounds: Normal breath sounds. No stridor. No wheezing, rhonchi or rales.   Chest:      Chest wall: No tenderness.   Abdominal:      General: There is no distension.      Palpations: Abdomen is soft.      Tenderness: There is no abdominal tenderness. There is no guarding.   Musculoskeletal:      Right lower leg: No edema.      Left lower leg: No edema.   Skin:     General: Skin  is warm and dry.      Findings: No lesion or rash.   Neurological:      Mental Status: He is alert and oriented to person, place, and time.

## 2024-09-04 ENCOUNTER — TELEPHONE (OUTPATIENT)
Age: 69
End: 2024-09-04

## 2024-09-04 DIAGNOSIS — R10.9 ABDOMINAL PAIN, UNSPECIFIED ABDOMINAL LOCATION: ICD-10-CM

## 2024-09-04 RX ORDER — DICYCLOMINE HYDROCHLORIDE 10 MG/1
10 CAPSULE ORAL
Qty: 30 CAPSULE | Refills: 0 | Status: SHIPPED | OUTPATIENT
Start: 2024-09-04 | End: 2024-09-14

## 2024-09-04 NOTE — TELEPHONE ENCOUNTER
Patient would like to know if Dr. Pfeiffer would like him to have another diabetic eye exam now since an amb referral was places at his office visit yesterday or wait until January when he is due. He typically gets them once per year.     Please call to clarify. Thank you!

## 2024-09-05 ENCOUNTER — NURSE TRIAGE (OUTPATIENT)
Age: 69
End: 2024-09-05

## 2024-09-05 NOTE — TELEPHONE ENCOUNTER
Regarding: Medication side effects  ----- Message from Tonia DAVIDSON sent at 9/5/2024 11:37 AM EDT -----  Was in the office 09/03. Prescribed dicyclomine (BENTYL) 10 mg capsule . States now has diarrhea. Going every 15 min. Has had 2 accidents . Please advise. Did not take today .

## 2024-09-05 NOTE — TELEPHONE ENCOUNTER
Patient called back in a panic that he missed a call from the office.  Tried to warm transfer to office clinical and then to clerical without an answer.  Patient said he tried to  on his cell, but the person at the office hung up.  He would really appreciate it if someone can return his call again on his land line - [phone].  Said he is really scared and is not feeling well.

## 2024-09-09 ENCOUNTER — TELEMEDICINE (OUTPATIENT)
Dept: OTHER | Facility: HOSPITAL | Age: 69
End: 2024-09-09
Payer: MEDICARE

## 2024-09-09 ENCOUNTER — NURSE TRIAGE (OUTPATIENT)
Dept: OTHER | Facility: OTHER | Age: 69
End: 2024-09-09

## 2024-09-09 DIAGNOSIS — R19.7 DIARRHEA, UNSPECIFIED TYPE: Primary | ICD-10-CM

## 2024-09-09 PROCEDURE — 99212 OFFICE O/P EST SF 10 MIN: CPT | Performed by: NURSE PRACTITIONER

## 2024-09-09 NOTE — TELEPHONE ENCOUNTER
"Regarding: Diarrhea  ----- Message from Zenaida CHOI sent at 9/9/2024  7:21 PM EDT -----  \" I was told to stop taking my medication. I just had an accident in my pants, I want to know what I can take\"    "

## 2024-09-09 NOTE — PROGRESS NOTES
Required Documentation:  Encounter provider: YOUNG Alberts    Identify all parties in room with patient during virtual visit:  No one else    The patient was identified by name and date of birth. Juan BARCENAS Lucero was informed that this is a telemedicine visit and that the visit is being conducted through the Epic Embedded platform. He agrees to proceed..  My office door was closed. No one else was in the room.  He acknowledged consent and understanding of privacy and security of the video platform. The patient has agreed to participate and understands they can discontinue the visit at any time.    Verification of patient location:  Patient is located at Home in the following state in which I hold an active license [place]    Patient is aware this is a billable service.     Reason for visit is   Chief Complaint   Patient presents with    Diarrhea        Assessment/Plan:    Param was seen today for diarrhea.    Diagnoses and all orders for this visit:    Diarrhea, unspecified type      Patient Instructions   Stay hydrated.  Try a dose of imodium to see if this helps.  Monitor diet.  Follow up with office tomorrow.  Go to ER with any worsening symptoms, abd pain or dehydration.   Subjective  This is a 68 year year old male here today for video visit.  He states he is having diarrhea.  He states the diarrhea started last week.  He talk to PCP and they recommend he stop the bentyl and he stopped this last Thursday.  He states he had a episode of explosive diarrhea again tonight.  He continues to have some soreness his abd.  He has had this since last Monday.  He had loose stool and then explosive yellow diarrhea.  He has not been on any recent antibiotic. He is concerned about diarrhea.            Past Medical History:   Diagnosis Date    Anxiety     Depression     GERD (gastroesophageal reflux disease)     Hyperlipidemia     Kidney stone     Polymyalgia rheumatica syndrome (HCC)     Psychiatric disorder      anxiety       Past Surgical History:   Procedure Laterality Date    APPENDECTOMY      COLONOSCOPY      KIDNEY STONE SURGERY      WY RPR 1ST INGUN HRNA AGE 4 YRS/> REDUCIBLE Left 2/16/2024    Procedure: REPAIR HERNIA INGUINAL;  Surgeon: Pepe Woodall MD;  Location: [facility];  Service: General    WY RPR AA HERNIA 1ST < 3 CM REDUCIBLE N/A 2/16/2024    Procedure: REPAIR HERNIA UMBILICAL;  Surgeon: Pepe Woodall MD;  Location: [facility];  Service: General    TONSILLECTOMY      TRANSURETHRAL RESECTION OF PROSTATE      WISDOM TOOTH EXTRACTION          Allergies   Allergen Reactions    Tamsulosin Anxiety     Taking currently with no problems       Review of Systems   Constitutional: Negative.    Respiratory: Negative.     Gastrointestinal:  Positive for diarrhea.   Skin: Negative.    Neurological: Negative.    Psychiatric/Behavioral: Negative.         Video Exam    There were no vitals filed for this visit.    Physical Exam  Constitutional:       General: He is not in acute distress.     Appearance: Normal appearance. He is not ill-appearing or toxic-appearing.   HENT:      Head: Normocephalic and atraumatic.   Abdominal:      Tenderness: There is abdominal tenderness (mild).   Neurological:      Mental Status: He is alert and oriented to person, place, and time.   Psychiatric:         Mood and Affect: Mood normal.         Behavior: Behavior normal.         Thought Content: Thought content normal.         Judgment: Judgment normal.         Visit Time  Total Visit Duration: 8 minutes

## 2024-09-09 NOTE — TELEPHONE ENCOUNTER
"Reason for Disposition   [1] Constant abdominal pain AND [2] present > 2 hours    Answer Assessment - Initial Assessment Questions  1. DIARRHEA SEVERITY: \"How bad is the diarrhea?\" \"How many extra stools have you had in the past 24 hours than normal?\"     - NO DIARRHEA (SCALE 0)    - MILD (SCALE 1-3): Few loose or mushy BMs; increase of 1-3 stools over normal daily number of stools; mild increase in ostomy output.    -  MODERATE (SCALE 4-7): Increase of 4-6 stools daily over normal; moderate increase in ostomy output.  * SEVERE (SCALE 8-10; OR 'WORST POSSIBLE'): Increase of 7 or more stools daily over normal; moderate increase in ostomy output; incontinence.      4  2. ONSET: \"When did the diarrhea begin?\"       Tonight  3. BM CONSISTENCY: \"How loose or watery is the diarrhea?\"       Watery  4. VOMITING: \"Are you also vomiting?\" If Yes, ask: \"How many times in the past 24 hours?\"       Denies, has nausea  5. ABDOMINAL PAIN: \"Are you having any abdominal pain?\" If Yes, ask: \"What does it feel like?\" (e.g., crampy, dull, intermittent, constant)       Yes, sore pain in navel  6. ABDOMINAL PAIN SEVERITY: If present, ask: \"How bad is the pain?\"  (e.g., Scale 1-10; mild, moderate, or severe)    - MILD (1-3): doesn't interfere with normal activities, abdomen soft and not tender to touch     - MODERATE (4-7): interferes with normal activities or awakens from sleep, tender to touch     - SEVERE (8-10): excruciating pain, doubled over, unable to do any normal activities        7/10 pain, constant goes away after going to bathroom and then goes away and comes back again immediately  7. ORAL INTAKE: If vomiting, \"Have you been able to drink liquids?\" \"How much fluids have you had in the past 24 hours?\"      6-7 glasses of water.  8. HYDRATION: \"Any signs of dehydration?\" (e.g., dry mouth [not just dry lips], too weak to stand, dizziness, new weight loss) \"When did you last urinate?\"      Losing weight, 5  pounds since last " "visit a few months back  9. EXPOSURE: \"Have you traveled to a foreign country recently?\" \"Have you been exposed to anyone with diarrhea?\" \"Could you have eaten any food that was spoiled?\"      Denies foreign country, sister had diarrhea the past few days as well, denies food poisoning  10. ANTIBIOTIC USE: \"Are you taking antibiotics now or have you taken antibiotics in the past 2 months?\"        Denies  11. OTHER SYMPTOMS: \"Do you have any other symptoms?\" (e.g., fever, blood in stool)        Denies feeling feverish, yellow/brown stool no blood    Protocols used: Diarrhea-ADULT-AH    "

## 2024-09-10 ENCOUNTER — TELEPHONE (OUTPATIENT)
Age: 69
End: 2024-09-10

## 2024-09-10 NOTE — PATIENT INSTRUCTIONS
Stay hydrated.  Try a dose of imodium to see if this helps.  Monitor diet.  Follow up with office tomorrow.  Go to ER with any worsening symptoms, abd pain or dehydration.

## 2024-09-10 NOTE — TELEPHONE ENCOUNTER
Care advice and recommendations given.   Patient verbalized understanding.      Please follow up with patient

## 2024-09-11 ENCOUNTER — OFFICE VISIT (OUTPATIENT)
Dept: INTERNAL MEDICINE CLINIC | Facility: CLINIC | Age: 69
End: 2024-09-11
Payer: MEDICARE

## 2024-09-11 ENCOUNTER — TELEPHONE (OUTPATIENT)
Dept: INTERNAL MEDICINE CLINIC | Facility: CLINIC | Age: 69
End: 2024-09-11

## 2024-09-11 VITALS
WEIGHT: 159 LBS | TEMPERATURE: 98.3 F | HEIGHT: 67 IN | OXYGEN SATURATION: 96 % | SYSTOLIC BLOOD PRESSURE: 132 MMHG | HEART RATE: 80 BPM | DIASTOLIC BLOOD PRESSURE: 82 MMHG | BODY MASS INDEX: 24.96 KG/M2

## 2024-09-11 DIAGNOSIS — F33.9 DEPRESSION, RECURRENT (HCC): ICD-10-CM

## 2024-09-11 DIAGNOSIS — K21.9 GASTROESOPHAGEAL REFLUX DISEASE WITHOUT ESOPHAGITIS: ICD-10-CM

## 2024-09-11 DIAGNOSIS — E78.2 MIXED HYPERLIPIDEMIA: ICD-10-CM

## 2024-09-11 DIAGNOSIS — G47.33 OSA ON CPAP: ICD-10-CM

## 2024-09-11 DIAGNOSIS — E22.2 SYNDROME OF INAPPROPRIATE SECRETION OF ANTIDIURETIC HORMONE (HCC): Chronic | ICD-10-CM

## 2024-09-11 DIAGNOSIS — E11.9 TYPE 2 DIABETES, HBA1C GOAL < 7% (HCC): Primary | ICD-10-CM

## 2024-09-11 DIAGNOSIS — F42.8 OTHER OBSESSIVE-COMPULSIVE DISORDERS: ICD-10-CM

## 2024-09-11 PROCEDURE — 99214 OFFICE O/P EST MOD 30 MIN: CPT | Performed by: INTERNAL MEDICINE

## 2024-09-11 PROCEDURE — G2211 COMPLEX E/M VISIT ADD ON: HCPCS | Performed by: INTERNAL MEDICINE

## 2024-09-11 RX ORDER — CYCLOSPORINE 0.5 MG/ML
EMULSION OPHTHALMIC
COMMUNITY
Start: 2024-08-28

## 2024-09-11 NOTE — PROGRESS NOTES
Assessment/Plan:             1. Type 2 diabetes, HbA1c goal < 7% (HCC)  Comments:  diet and exercise discussed  2. Other obsessive-compulsive disorders  Comments:  has apt with psychaitrist tomorrow  3. ADH disorder  4. Gastroesophageal reflux disease without esophagitis  5. GONZALEZ on CPAP  Comments:  continue using cpap  6. Depression, recurrent (HCC)  7. Mixed hyperlipidemia  Comments:  continue same med         Subjective:      Patient ID: Juan Lucero is a 68 y.o. male.    Follow-up on multiple medical problems with her stable on current medication, diarrhea, no blood in the bowel movement,        The following portions of the patient's history were reviewed and updated as appropriate: He  has a past medical history of Anxiety, Depression, GERD (gastroesophageal reflux disease), Hyperlipidemia, Kidney stone, Polymyalgia rheumatica syndrome (HCC), and Psychiatric disorder.  He   Patient Active Problem List    Diagnosis Date Noted    GONZALEZ on CPAP 08/27/2024    GONZALEZ (obstructive sleep apnea) 06/11/2024    Type 2 diabetes, HbA1c goal < 7% (HCC) 05/17/2024    Excessive daytime sleepiness 05/08/2024    Sleep disturbance 05/08/2024    Periodic limb movement 05/08/2024    Temporal arteritis (HCC) 02/02/2024    Left inguinal hernia 11/13/2023    Umbilical hernia without obstruction and without gangrene 11/03/2023    Gastroesophageal reflux disease without esophagitis 11/03/2023    Depression, recurrent (HCC) 05/23/2023    Preop exam for internal medicine 07/20/2022    Polymyalgia rheumatica syndrome (HCC) 05/18/2022    Impaired fasting blood sugar 05/18/2022    Other chest pain 11/17/2020    Pain in left knee 05/23/2018    Left facial numbness 12/18/2016    History of temporal arteritis 12/18/2016    HTN (hypertension) 12/18/2016    Mixed hyperlipidemia 12/18/2016    Acute nonintractable headache 12/18/2016    Obsessive compulsive disorder 12/18/2016    Anxiety 12/18/2016    ADH disorder 12/18/2016     He  has a past  surgical history that includes Appendectomy; Tonsillectomy; Kidney stone surgery; Transurethral resection of prostate; [place] tooth extraction; Colonoscopy; pr rpr 1st ingun hrna age 4 yrs/> reducible (Left, 2/16/2024); and pr rpr aa hernia 1st < 3 cm reducible (N/A, 2/16/2024).  His family history includes Cancer in his father and mother; Diabetes in his mother; Heart attack in his father; Heart disease in his father; Prostate cancer in his father.  He  reports that he has never smoked. He has never used smokeless tobacco. He reports that he does not drink alcohol and does not use drugs.  Current Outpatient Medications   Medication Sig Dispense Refill    Cholecalciferol (VITAMIN D3) 1,000 units tablet Take 1 tablet (1,000 Units total) by mouth daily 90 tablet 2    clonazePAM (KlonoPIN) 0.5 mg tablet Take 0.5 mg by mouth daily at bedtime .5 mg once at night      cycloSPORINE (RESTASIS) 0.05 % ophthalmic emulsion       methylphenidate (CONCERTA) 18 mg ER tablet Take 18 mg by mouth daily      QUEtiapine (SEROquel) 50 mg tablet Take 50 mg by mouth daily at bedtime      simvastatin (ZOCOR) 10 mg tablet Take 1 tablet (10 mg total) by mouth daily at bedtime 90 tablet 1    vilazodone (VIIBRYD) 40 mg tablet Take 40 mg by mouth daily with breakfast      vitamin B-12 (VITAMIN B-12) 1,000 mcg tablet Take 1 tablet (1,000 mcg total) by mouth daily 90 tablet 1    clobetasol (TEMOVATE) 0.05 % ointment Apply topically 2 (two) times a day (Patient not taking: Reported on 9/11/2024) 120 g 1    Desvenlafaxine Succinate ER 25 MG TB24 Take 25 mg by mouth in the morning (Patient not taking: Reported on 6/19/2024)      dicyclomine (BENTYL) 10 mg capsule Take 1 capsule (10 mg total) by mouth 3 (three) times a day before meals for 10 days (Patient not taking: Reported on 9/11/2024) 30 capsule 0    famotidine (PEPCID) 20 mg tablet Take 20 mg by mouth 2 (two) times a day (Patient not taking: Reported on 9/11/2024)      methotrexate 2.5 MG  tablet Take by mouth 5 tablets on Sundays (Patient not taking: Reported on 5/17/2024)      pantoprazole (PROTONIX) 40 mg tablet Take 1 tablet (40 mg total) by mouth daily before breakfast (Patient not taking: Reported on 9/11/2024) 30 tablet 0     No current facility-administered medications for this visit.     Current Outpatient Medications on File Prior to Visit   Medication Sig    Cholecalciferol (VITAMIN D3) 1,000 units tablet Take 1 tablet (1,000 Units total) by mouth daily    clonazePAM (KlonoPIN) 0.5 mg tablet Take 0.5 mg by mouth daily at bedtime .5 mg once at night    cycloSPORINE (RESTASIS) 0.05 % ophthalmic emulsion     methylphenidate (CONCERTA) 18 mg ER tablet Take 18 mg by mouth daily    QUEtiapine (SEROquel) 50 mg tablet Take 50 mg by mouth daily at bedtime    simvastatin (ZOCOR) 10 mg tablet Take 1 tablet (10 mg total) by mouth daily at bedtime    vilazodone (VIIBRYD) 40 mg tablet Take 40 mg by mouth daily with breakfast    vitamin B-12 (VITAMIN B-12) 1,000 mcg tablet Take 1 tablet (1,000 mcg total) by mouth daily    clobetasol (TEMOVATE) 0.05 % ointment Apply topically 2 (two) times a day (Patient not taking: Reported on 9/11/2024)    Desvenlafaxine Succinate ER 25 MG TB24 Take 25 mg by mouth in the morning (Patient not taking: Reported on 6/19/2024)    dicyclomine (BENTYL) 10 mg capsule Take 1 capsule (10 mg total) by mouth 3 (three) times a day before meals for 10 days (Patient not taking: Reported on 9/11/2024)    famotidine (PEPCID) 20 mg tablet Take 20 mg by mouth 2 (two) times a day (Patient not taking: Reported on 9/11/2024)    methotrexate 2.5 MG tablet Take by mouth 5 tablets on Sundays (Patient not taking: Reported on 5/17/2024)    pantoprazole (PROTONIX) 40 mg tablet Take 1 tablet (40 mg total) by mouth daily before breakfast (Patient not taking: Reported on 9/11/2024)     No current facility-administered medications on file prior to visit.     He is allergic to tamsulosin..    Review of  "Systems   Constitutional:  Negative for chills and fever.   HENT:  Negative for congestion, ear pain and sore throat.    Eyes:  Negative for pain.   Respiratory:  Negative for cough and shortness of breath.    Cardiovascular:  Negative for chest pain and leg swelling.   Gastrointestinal:  Negative for abdominal pain, nausea and vomiting.   Endocrine: Negative for polyuria.   Genitourinary:  Negative for difficulty urinating, frequency and urgency.   Musculoskeletal:  Negative for arthralgias and back pain.   Skin:  Negative for rash.   Neurological:  Negative for weakness and headaches.   Psychiatric/Behavioral:  Negative for sleep disturbance. The patient is not nervous/anxious.          Objective:      /82 (BP Location: Left arm, Patient Position: Sitting, Cuff Size: Standard)   Pulse 80   Temp 98.3 °F (36.8 °C) (Temporal)   Ht 5' 7\" (1.702 m)   Wt 72.1 kg (159 lb)   SpO2 96%   BMI 24.90 kg/m²     Recent Results (from the past 1344 hour(s))   Albumin / creatinine urine ratio    Collection Time: 07/24/24  8:37 AM   Result Value Ref Range    Creatinine, Ur 125.1 Reference range not established. mg/dL    Albumin,U,Random 10.5 <20.0 mg/L    Albumin Creat Ratio 8 0 - 30 mg/g creatinine   CBC and differential    Collection Time: 07/24/24  8:37 AM   Result Value Ref Range    WBC 4.52 4.31 - 10.16 Thousand/uL    RBC 5.11 3.88 - 5.62 Million/uL    Hemoglobin 14.7 12.0 - 17.0 g/dL    Hematocrit 44.0 36.5 - 49.3 %    MCV 86 82 - 98 fL    MCH 28.8 26.8 - 34.3 pg    MCHC 33.4 31.4 - 37.4 g/dL    RDW 12.5 11.6 - 15.1 %    MPV 10.1 8.9 - 12.7 fL    Platelets 212 149 - 390 Thousands/uL    nRBC 0 /100 WBCs    Segmented % 51 43 - 75 %    Immature Grans % 0 0 - 2 %    Lymphocytes % 35 14 - 44 %    Monocytes % 9 4 - 12 %    Eosinophils Relative 4 0 - 6 %    Basophils Relative 1 0 - 1 %    Absolute Neutrophils 2.34 1.85 - 7.62 Thousands/µL    Absolute Immature Grans 0.01 0.00 - 0.20 Thousand/uL    Absolute Lymphocytes 1.56 " 0.60 - 4.47 Thousands/µL    Absolute Monocytes 0.40 0.17 - 1.22 Thousand/µL    Eosinophils Absolute 0.17 0.00 - 0.61 Thousand/µL    Basophils Absolute 0.04 0.00 - 0.10 Thousands/µL   Comprehensive metabolic panel    Collection Time: 07/24/24  8:37 AM   Result Value Ref Range    Sodium 138 135 - 147 mmol/L    Potassium 3.9 3.5 - 5.3 mmol/L    Chloride 104 96 - 108 mmol/L    CO2 30 21 - 32 mmol/L    ANION GAP 4 4 - 13 mmol/L    BUN 15 5 - 25 mg/dL    Creatinine 1.11 0.60 - 1.30 mg/dL    Glucose, Fasting 103 (H) 65 - 99 mg/dL    Calcium 9.4 8.4 - 10.2 mg/dL    AST 15 13 - 39 U/L    ALT 13 7 - 52 U/L    Alkaline Phosphatase 88 34 - 104 U/L    Total Protein 6.3 (L) 6.4 - 8.4 g/dL    Albumin 4.1 3.5 - 5.0 g/dL    Total Bilirubin 0.91 0.20 - 1.00 mg/dL    eGFR 67 ml/min/1.73sq m   Lipid Panel with Direct LDL reflex    Collection Time: 07/24/24  8:37 AM   Result Value Ref Range    Cholesterol 198 See Comment mg/dL    Triglycerides 132 See Comment mg/dL    HDL, Direct 46 >=40 mg/dL    LDL Calculated 126 (H) 0 - 100 mg/dL   TSH, 3rd generation    Collection Time: 07/24/24  8:37 AM   Result Value Ref Range    TSH 3RD GENERATON 2.766 0.450 - 4.500 uIU/mL   Hemoglobin A1C    Collection Time: 07/24/24  8:37 AM   Result Value Ref Range    Hemoglobin A1C 6.6 (H) Normal 4.0-5.6%; PreDiabetic 5.7-6.4%; Diabetic >=6.5%; Glycemic control for adults with diabetes <7.0% %     mg/dl   CBC and differential    Collection Time: 08/26/24  8:55 AM   Result Value Ref Range    WBC 4.46 4.31 - 10.16 Thousand/uL    RBC 5.18 3.88 - 5.62 Million/uL    Hemoglobin 14.9 12.0 - 17.0 g/dL    Hematocrit 45.6 36.5 - 49.3 %    MCV 88 82 - 98 fL    MCH 28.8 26.8 - 34.3 pg    MCHC 32.7 31.4 - 37.4 g/dL    RDW 12.3 11.6 - 15.1 %    MPV 10.4 8.9 - 12.7 fL    Platelets 213 149 - 390 Thousands/uL    nRBC 0 /100 WBCs    Segmented % 53 43 - 75 %    Immature Grans % 0 0 - 2 %    Lymphocytes % 34 14 - 44 %    Monocytes % 8 4 - 12 %    Eosinophils Relative 4 0  - 6 %    Basophils Relative 1 0 - 1 %    Absolute Neutrophils 2.36 1.85 - 7.62 Thousands/µL    Absolute Immature Grans 0.01 0.00 - 0.20 Thousand/uL    Absolute Lymphocytes 1.51 0.60 - 4.47 Thousands/µL    Absolute Monocytes 0.37 0.17 - 1.22 Thousand/µL    Eosinophils Absolute 0.16 0.00 - 0.61 Thousand/µL    Basophils Absolute 0.05 0.00 - 0.10 Thousands/µL   Comprehensive metabolic panel    Collection Time: 08/26/24  8:55 AM   Result Value Ref Range    Sodium 140 135 - 147 mmol/L    Potassium 4.0 3.5 - 5.3 mmol/L    Chloride 105 96 - 108 mmol/L    CO2 31 21 - 32 mmol/L    ANION GAP 4 4 - 13 mmol/L    BUN 13 5 - 25 mg/dL    Creatinine 1.06 0.60 - 1.30 mg/dL    Glucose, Fasting 111 (H) 65 - 99 mg/dL    Calcium 9.1 8.4 - 10.2 mg/dL    AST 19 13 - 39 U/L    ALT 18 7 - 52 U/L    Alkaline Phosphatase 91 34 - 104 U/L    Total Protein 6.4 6.4 - 8.4 g/dL    Albumin 4.2 3.5 - 5.0 g/dL    Total Bilirubin 0.65 0.20 - 1.00 mg/dL    eGFR 71 ml/min/1.73sq m   C-reactive protein    Collection Time: 08/26/24  8:55 AM   Result Value Ref Range    CRP <1.0 <3.0 mg/L   Sedimentation rate, automated    Collection Time: 08/26/24  8:55 AM   Result Value Ref Range    Sed Rate 3 0 - 19 mm/hour        Physical Exam  Constitutional:       Appearance: Normal appearance.   HENT:      Head: Normocephalic.      Right Ear: External ear normal.      Left Ear: External ear normal.      Nose: Nose normal. No congestion.      Mouth/Throat:      Mouth: Mucous membranes are moist.      Pharynx: Oropharynx is clear. No oropharyngeal exudate or posterior oropharyngeal erythema.   Eyes:      Extraocular Movements: Extraocular movements intact.      Conjunctiva/sclera: Conjunctivae normal.   Cardiovascular:      Rate and Rhythm: Normal rate and regular rhythm.      Heart sounds: Normal heart sounds. No murmur heard.  Pulmonary:      Effort: Pulmonary effort is normal.      Breath sounds: Normal breath sounds. No wheezing or rales.   Abdominal:      General:  Abdomen is flat. There is no distension.      Palpations: Abdomen is soft.      Tenderness: There is no abdominal tenderness.   Musculoskeletal:         General: Normal range of motion.      Cervical back: Normal range of motion and neck supple.      Right lower leg: No edema.      Left lower leg: No edema.   Lymphadenopathy:      Cervical: No cervical adenopathy.   Skin:     General: Skin is warm.   Neurological:      General: No focal deficit present.      Mental Status: He is alert and oriented to person, place, and time.

## 2024-09-12 ENCOUNTER — TELEPHONE (OUTPATIENT)
Age: 69
End: 2024-09-12

## 2024-09-12 NOTE — TELEPHONE ENCOUNTER
Pt called and stated he was advised by Dr. Smith to speak with his psychiatrist after their OV yesterday and then to call the office to transpire what was discussed. I offered to schedule an appt w/ Dr. Smith to discuss in person but pt mentioned Dr. Smith only advised to call and possibly discuss over the phone.    Please advise and contact pt back to discuss before Dr. Smith leaves for the day, thank you.

## 2024-09-12 NOTE — TELEPHONE ENCOUNTER
Discussed with him, advised him to hold on till Sunday, and restart on Monday, and let me know how he feels on Tuesday

## 2024-09-17 NOTE — TELEPHONE ENCOUNTER
Patient calling requesting a call back from Dr. Smith.    Call back # [phone].    Please review,  Thank you

## 2024-09-17 NOTE — TELEPHONE ENCOUNTER
Discussed with him, advised him to hold on taking that medication this week, he can restart Monday, and let me know how it goes

## 2024-09-17 NOTE — TELEPHONE ENCOUNTER
Pt stating he's still not feeling quite himself.  He was up w/stomach issue last night but feel a little better by 4 o'clock.  Should he take another day or what should he do?    Pt refill a prescription for famotidine (PEPCID) 20 mg .  [facility] [place], [address] [place] [address]    It's a historical provider. He wants to know if Dr Smith can prescribe this to him.    Authorizing Provider:  Historical Provider, MD  [address], [place] [address]  Phone: [phone]   Fax: [phone]  ANISHA #: --   NPI: [id number]        Documenting User: Cat Nickerson MA

## 2024-09-20 ENCOUNTER — TELEPHONE (OUTPATIENT)
Age: 69
End: 2024-09-20

## 2024-09-20 NOTE — TELEPHONE ENCOUNTER
Patient called stating he did not want to start medication Concerta on Monday due to going away for the day.  He will contact PCP on 9/25 about starting medication. Please advise.

## 2024-10-08 ENCOUNTER — OFFICE VISIT (OUTPATIENT)
Dept: INTERNAL MEDICINE CLINIC | Facility: CLINIC | Age: 69
End: 2024-10-08
Payer: MEDICARE

## 2024-10-08 VITALS
DIASTOLIC BLOOD PRESSURE: 72 MMHG | OXYGEN SATURATION: 98 % | SYSTOLIC BLOOD PRESSURE: 122 MMHG | BODY MASS INDEX: 25.11 KG/M2 | WEIGHT: 160 LBS | TEMPERATURE: 98.1 F | HEIGHT: 67 IN | HEART RATE: 77 BPM

## 2024-10-08 DIAGNOSIS — Z00.00 MEDICARE ANNUAL WELLNESS VISIT, SUBSEQUENT: ICD-10-CM

## 2024-10-08 DIAGNOSIS — E11.9 DIABETIC EYE EXAM (HCC): ICD-10-CM

## 2024-10-08 DIAGNOSIS — F33.9 DEPRESSION, RECURRENT (HCC): ICD-10-CM

## 2024-10-08 DIAGNOSIS — E11.9 TYPE 2 DIABETES, HBA1C GOAL < 7% (HCC): Primary | ICD-10-CM

## 2024-10-08 DIAGNOSIS — E78.2 MIXED HYPERLIPIDEMIA: ICD-10-CM

## 2024-10-08 DIAGNOSIS — Z01.00 DIABETIC EYE EXAM (HCC): ICD-10-CM

## 2024-10-08 DIAGNOSIS — F42.8 OTHER OBSESSIVE-COMPULSIVE DISORDERS: ICD-10-CM

## 2024-10-08 PROCEDURE — 99214 OFFICE O/P EST MOD 30 MIN: CPT | Performed by: INTERNAL MEDICINE

## 2024-10-08 PROCEDURE — G0439 PPPS, SUBSEQ VISIT: HCPCS | Performed by: INTERNAL MEDICINE

## 2024-10-08 NOTE — PROGRESS NOTES
Ambulatory Visit  Name: Juan Lucero      : 1955      MRN: [id number]  Encounter Provider: Ashli Smith MD  Encounter Date: 10/8/2024   Encounter department: [facility] INTERNAL MEDICINE    Assessment & Plan  Type 2 diabetes, HbA1c goal < 7% ([facility])    Lab Results   Component Value Date    HGBA1C 6.6 (H) 2024       Orders:    Albumin / creatinine urine ratio; Future    CBC and differential; Future    Comprehensive metabolic panel; Future    Lipid Panel with Direct LDL reflex; Future    TSH, 3rd generation; Future    Hemoglobin A1C; Future    Diabetic eye exam (HCC)    Orders:    Ambulatory Referral to Ophthalmology; Future    Mixed hyperlipidemia    Orders:    Comprehensive metabolic panel; Future    Lipid Panel with Direct LDL reflex; Future    TSH, 3rd generation; Future    Depression, recurrent ([facility])           Other obsessive-compulsive disorders         Medicare annual wellness visit, subsequent           Depression Screening and Follow-up Plan: Patient was screened for depression during today's encounter. They screened negative with a PHQ-9 score of 0.      Preventive health issues were discussed with patient, and age appropriate screening tests were ordered as noted in patient's After Visit Summary. Personalized health advice and appropriate referrals for health education or preventive services given if needed, as noted in patient's After Visit Summary.    History of Present Illness     Follow-up on multiple medical problems to ensure they are stable on current medication, also Medicare wellness exam       Patient Care Team:  Ashli Smith MD as PCP - General (Internal Medicine)    Review of Systems   Constitutional:  Negative for chills and fever.   HENT:  Negative for congestion, ear pain and sore throat.    Eyes:  Negative for pain.   Respiratory:  Negative for cough and shortness of breath.    Cardiovascular:  Negative for chest pain and leg swelling.   Gastrointestinal:   Negative for abdominal pain, nausea and vomiting.   Endocrine: Negative for polyuria.   Genitourinary:  Negative for difficulty urinating, frequency and urgency.   Musculoskeletal:  Negative for arthralgias and back pain.   Skin:  Negative for rash.   Neurological:  Negative for weakness and headaches.   Psychiatric/Behavioral:  Negative for sleep disturbance. The patient is not nervous/anxious.      Medical History Reviewed by provider this encounter:  Tobacco  Allergies  Meds  Problems  Med Hx  Surg Hx  Fam Hx       Annual Wellness Visit Questionnaire   Juan is here for his Subsequent Wellness visit. Last Medicare Wellness visit information reviewed, patient interviewed and updates made to the record today.      Health Risk Assessment:   Patient rates overall health as good. Patient feels that their physical health rating is slightly better. Patient is satisfied with their life. Eyesight was rated as same. Hearing was rated as same. Patients states they are often angry. Patient states they are sometimes unusually tired/fatigued. Pain experienced in the last 7 days has been none. Patient states that he has experienced no weight loss or gain in last 6 months.     Depression Screening:   PHQ-9 Score: 0      Fall Risk Screening:   In the past year, patient has experienced: no history of falling in past year      Home Safety:  Patient does not have trouble with stairs inside or outside of their home. Patient has working smoke alarms and has working carbon monoxide detector. Home safety hazards include: none.     Nutrition:   Current diet is Regular.     Medications:   Patient is not currently taking any over-the-counter supplements. Patient is able to manage medications.     Activities of Daily Living (ADLs)/Instrumental Activities of Daily Living (IADLs):   Walk and transfer into and out of bed and chair?: Yes  Dress and groom yourself?: Yes    Bathe or shower yourself?: Yes    Feed yourself? Yes  Do your  laundry/housekeeping?: Yes  Manage your money, pay your bills and track your expenses?: No  Make your own meals?: No    Do your own shopping?: No    Previous Hospitalizations:   Any hospitalizations or ED visits within the last 12 months?: No      Advance Care Planning:   Living will: No    Durable POA for healthcare: Yes    Advanced directive: Yes    ACP document given: Yes      Cognitive Screening:   Provider or family/friend/caregiver concerned regarding cognition?: No    PREVENTIVE SCREENINGS      Cardiovascular Screening:    General: Screening Not Indicated and History Lipid Disorder      Diabetes Screening:     General: Screening Not Indicated and History Diabetes      Colorectal Cancer Screening:     General: Screening Current      Prostate Cancer Screening:    General: Screening Current      Abdominal Aortic Aneurysm (AAA) Screening:    Risk factors include: age between 65-76 yo        Lung Cancer Screening:     General: Screening Not Indicated      Hepatitis C Screening:    General: Screening Current    Screening, Brief Intervention, and Referral to Treatment (SBIRT)    Screening  Typical number of drinks in a day: 0  Typical number of drinks in a week: 0  Interpretation: Low risk drinking behavior.    Single Item Drug Screening:  How often have you used an illegal drug (including marijuana) or a prescription medication for non-medical reasons in the past year? never    Single Item Drug Screen Score: 0  Interpretation: Negative screen for possible drug use disorder    Social Determinants of Health     Financial Resource Strain: Low Risk  (8/2/2023)    Overall Financial Resource Strain (CARDIA)     Difficulty of Paying Living Expenses: Not hard at all   Food Insecurity: No Food Insecurity (10/8/2024)    Hunger Vital Sign     Worried About Running Out of Food in the Last Year: Never true     Ran Out of Food in the Last Year: Never true   Transportation Needs: No Transportation Needs (10/8/2024)    PRAPARE -  "Transportation     Lack of Transportation (Medical): No     Lack of Transportation (Non-Medical): No   Housing Stability: Low Risk  (10/8/2024)    Housing Stability Vital Sign     Unable to Pay for Housing in the Last Year: No     Number of Times Moved in the Last Year: 0     Homeless in the Last Year: No   Utilities: Not At Risk (10/8/2024)    [facility] Utilities     Threatened with loss of utilities: No     No results found.    Objective     /72 (BP Location: Left arm, Patient Position: Sitting, Cuff Size: Standard)   Pulse 77   Temp 98.1 °F (36.7 °C) (Temporal)   Ht 5' 7\" (1.702 m)   Wt 72.6 kg (160 lb)   SpO2 98%   BMI 25.06 kg/m²     Physical Exam  Vitals and nursing note reviewed.   Constitutional:       General: He is not in acute distress.     Appearance: He is well-developed.   HENT:      Head: Normocephalic and atraumatic.      Right Ear: External ear normal.      Left Ear: External ear normal.      Mouth/Throat:      Mouth: Mucous membranes are moist.      Pharynx: Oropharynx is clear.   Eyes:      Extraocular Movements: Extraocular movements intact.      Conjunctiva/sclera: Conjunctivae normal.   Cardiovascular:      Rate and Rhythm: Normal rate and regular rhythm.      Heart sounds: Normal heart sounds. No murmur heard.  Pulmonary:      Effort: Pulmonary effort is normal. No respiratory distress.      Breath sounds: Normal breath sounds. No wheezing or rales.   Abdominal:      General: Abdomen is flat. There is no distension.      Palpations: Abdomen is soft.      Tenderness: There is no abdominal tenderness.   Musculoskeletal:         General: No swelling.      Cervical back: Neck supple.      Right lower leg: No edema.      Left lower leg: No edema.   Skin:     General: Skin is warm and dry.      Capillary Refill: Capillary refill takes less than 2 seconds.   Neurological:      General: No focal deficit present.      Mental Status: He is alert and oriented to person, place, and time. "   Psychiatric:         Mood and Affect: Mood normal.

## 2024-10-08 NOTE — ASSESSMENT & PLAN NOTE
Lab Results   Component Value Date    HGBA1C 6.6 (H) 07/24/2024       Orders:    Albumin / creatinine urine ratio; Future    CBC and differential; Future    Comprehensive metabolic panel; Future    Lipid Panel with Direct LDL reflex; Future    TSH, 3rd generation; Future    Hemoglobin A1C; Future

## 2024-10-15 ENCOUNTER — TELEPHONE (OUTPATIENT)
Age: 69
End: 2024-10-15

## 2024-10-15 NOTE — TELEPHONE ENCOUNTER
Patient called in because he received a notice in the mail from the office letting him know that he needs to schedule a diabetic eye exam. He wanted to inform Dr. Smith that he doesn't have an eye exam appointment scheduled till January so he wanted to know if he could wait till January to have this test done or if doctor Sarah would like him to get this done before then. Can someone please follow up with the patient in regards to this     thank you

## 2024-10-19 ENCOUNTER — HOSPITAL ENCOUNTER (EMERGENCY)
Facility: HOSPITAL | Age: 69
Discharge: HOME | End: 2024-10-19
Attending: STUDENT IN AN ORGANIZED HEALTH CARE EDUCATION/TRAINING PROGRAM
Payer: MEDICARE

## 2024-10-19 ENCOUNTER — APPOINTMENT (EMERGENCY)
Dept: RADIOLOGY | Facility: HOSPITAL | Age: 69
End: 2024-10-19
Payer: MEDICARE

## 2024-10-19 VITALS
RESPIRATION RATE: 16 BRPM | DIASTOLIC BLOOD PRESSURE: 90 MMHG | TEMPERATURE: 97.7 F | SYSTOLIC BLOOD PRESSURE: 142 MMHG | BODY MASS INDEX: 25.66 KG/M2 | OXYGEN SATURATION: 96 % | HEART RATE: 80 BPM | WEIGHT: 163.8 LBS

## 2024-10-19 DIAGNOSIS — M54.12 CERVICAL RADICULOPATHY: Primary | ICD-10-CM

## 2024-10-19 PROCEDURE — 93005 ELECTROCARDIOGRAM TRACING: CPT

## 2024-10-19 PROCEDURE — 71045 X-RAY EXAM CHEST 1 VIEW: CPT

## 2024-10-19 PROCEDURE — 99284 EMERGENCY DEPT VISIT MOD MDM: CPT

## 2024-10-19 PROCEDURE — 73080 X-RAY EXAM OF ELBOW: CPT

## 2024-10-19 RX ORDER — GABAPENTIN 300 MG/1
300 CAPSULE ORAL ONCE
Status: COMPLETED | OUTPATIENT
Start: 2024-10-19 | End: 2024-10-19

## 2024-10-19 RX ORDER — ACETAMINOPHEN 325 MG/1
975 TABLET ORAL ONCE
Status: COMPLETED | OUTPATIENT
Start: 2024-10-19 | End: 2024-10-19

## 2024-10-19 RX ADMIN — ACETAMINOPHEN 975 MG: 325 TABLET ORAL at 07:35

## 2024-10-19 RX ADMIN — GABAPENTIN 300 MG: 300 CAPSULE ORAL at 07:35

## 2024-10-19 NOTE — ED PROVIDER NOTES
Time reflects when diagnosis was documented in both MDM as applicable and the Disposition within this note       Time User Action Codes Description Comment    10/19/2024  7:34 AM Myra Martin Add [G56.22] Ulnar neuropathy at elbow of left upper extremity     10/19/2024  8:09 AM Myra Martin Remove [G56.22] Ulnar neuropathy at elbow of left upper extremity     10/19/2024  8:09 AM Myra Martin Add [M54.12] Cervical radiculopathy           ED Disposition       ED Disposition   Discharge    Condition   Stable    Date/Time   Sat Oct 19, 2024  7:57 AM    Comment   Juan Lucero discharge to home/self care.                   Assessment & Plan       Medical Decision Making  68-year-old male with history of anxiety/depression, OCD, who presents with several hours of left upper extremity pain.  He reports that earlier last night he developed sudden onset shooting pain from his elbow to his pinky finger without associated numbness/tingling/weakness, chest pain, shortness of breath, or any other complaints.  He has never had this experience before.  The pain went away and then recurred this morning and so he decided to present to care.  He states that he is having significant concern that it could be related to his heart as his father  in his 80s from a cardiac event.    Exam with 5 out of 5 strength and normal sensation throughout bilateral upper extremities.  Patient has reproduction of his pain when palpating the ulnar nerve at his posterior olecranon process.  He has 2+ radial pulses that are equal bilaterally.  Spurling test positive on left.  Cardiopulmonary exam benign.      Differential diagnosis includes but is not limited to: Cervical radiculopathy, compression neuropathy, brachial plexus compression    Workup and treatment as below:    Imaging: Chest x-ray (see ED course)  EKG: See ED Course  Labs: N/A  Meds: Analgesia  Consults: N/A  Reassessment: Pain improved s/p PO analgesia    Dispo: Patient was  discharged to home with strict return precautions. Patient acknowledged understanding of plan and diagnostic results, and all their questions were answered to their satisfaction.    Amount and/or Complexity of Data Reviewed  Radiology: ordered.    Risk  OTC drugs.  Prescription drug management.        ED Course as of 10/19/24 0817   Sat Oct 19, 2024   0747 Chest x-ray with no mediastinal widening, normal cardiac silhouette, no acute intrathoracic abnormality   0749 EKG rate 73, NSR, normal axis/intervals, no acute signs of ischemia   0756 No significant osseous abnormality of the left elbow.       Medications   acetaminophen (TYLENOL) tablet 975 mg (975 mg Oral Given 10/19/24 0735)   gabapentin (NEURONTIN) capsule 300 mg (300 mg Oral Given 10/19/24 0735)       ED Risk Strat Scores                                               History of Present Illness       Chief Complaint   Patient presents with    Arm Pain     L arm pain starting yesterday resolved with heat pack, reoccured x 3.        Past Medical History:   Diagnosis Date    Anxiety     Depression     GERD (gastroesophageal reflux disease)     Hyperlipidemia     Kidney stone     Polymyalgia rheumatica syndrome (HCC)     Psychiatric disorder     anxiety      Past Surgical History:   Procedure Laterality Date    APPENDECTOMY      COLONOSCOPY      KIDNEY STONE SURGERY      FL RPR 1ST INGUN HRNA AGE 5 YRS/> REDUCIBLE Left 2/16/2024    Procedure: REPAIR HERNIA INGUINAL;  Surgeon: Pepe Woodall MD;  Location: [facility] MAIN OR;  Service: General    FL RPR AA HERNIA 1ST < 3 CM REDUCIBLE N/A 2/16/2024    Procedure: REPAIR HERNIA UMBILICAL;  Surgeon: Pepe Woodall MD;  Location: [facility];  Service: General    TONSILLECTOMY      TRANSURETHRAL RESECTION OF PROSTATE      WISDOM TOOTH EXTRACTION        Family History   Problem Relation Age of Onset    Diabetes Mother     Cancer Mother         MYLENOMA    Heart attack Father     Prostate cancer Father      Cancer Father         PROSTATE     Heart disease Father               Social History     Tobacco Use    Smoking status: Never    Smokeless tobacco: Never   Vaping Use    Vaping status: Never Used   Substance Use Topics    Alcohol use: No    Drug use: No      E-Cigarette/Vaping    E-Cigarette Use Never User       E-Cigarette/Vaping Substances    Nicotine No     THC No     CBD No     Flavoring No     Other No     Unknown No       I have reviewed and agree with the history as documented.     68-year-old male with history of anxiety/depression, OCD, who presents with several hours of left upper extremity pain.  He reports that earlier last night he developed sudden onset shooting pain from his elbow to his pinky finger without associated numbness/tingling/weakness, chest pain, shortness of breath, or any other complaints.  He has never had this experience before.  The pain went away and then recurred this morning and so he decided to present to care.  He states that he is having significant concern that it could be related to his heart as his father  in his 80s from a cardiac event.    Patient denies hx of ETOH/drug abuse.       Arm Pain  Associated symptoms: no abdominal pain, no chest pain, no cough, no diarrhea, no ear pain, no fever, no headaches, no nausea, no rash, no shortness of breath, no sore throat and no vomiting        Review of Systems   Constitutional:  Negative for chills and fever.   HENT:  Negative for ear pain and sore throat.    Eyes:  Negative for pain and visual disturbance.   Respiratory:  Negative for cough and shortness of breath.    Cardiovascular:  Negative for chest pain, palpitations and leg swelling.   Gastrointestinal:  Negative for abdominal pain, constipation, diarrhea, nausea and vomiting.   Genitourinary:  Negative for dysuria and hematuria.   Musculoskeletal:  Positive for arthralgias. Negative for back pain, neck pain and neck stiffness.   Skin:  Negative for  color change and rash.   Neurological:  Negative for dizziness, seizures, syncope, facial asymmetry, speech difficulty, weakness, light-headedness, numbness and headaches.   Psychiatric/Behavioral:  Negative for dysphoric mood, hallucinations, sleep disturbance and suicidal ideas. The patient is nervous/anxious.    All other systems reviewed and are negative.          Objective       ED Triage Vitals   Temperature Pulse Blood Pressure Respirations SpO2 Patient Position - Orthostatic VS   10/19/24 0700 10/19/24 0659 10/19/24 0659 10/19/24 0659 10/19/24 0659 10/19/24 0659   97.7 °F (36.5 °C) 76 124/79 16 95 % Lying      Temp Source Heart Rate Source BP Location FiO2 (%) Pain Score    10/19/24 0700 10/19/24 0659 10/19/24 0659 -- 10/19/24 0659    Oral Monitor Right arm  7      Vitals      Date and Time Temp Pulse SpO2 Resp BP Pain Score FACES Pain Rating User   10/19/24 0700 97.7 °F (36.5 °C) -- -- -- -- -- -- RJK   10/19/24 0659 -- 76 95 % 16 124/79 7 -- RJK            Physical Exam  Vitals and nursing note reviewed.   Constitutional:       General: He is not in acute distress.     Appearance: He is not ill-appearing or toxic-appearing.   HENT:      Head: Normocephalic.   Cardiovascular:      Rate and Rhythm: Normal rate and regular rhythm.      Heart sounds: Normal heart sounds.   Pulmonary:      Effort: Pulmonary effort is normal.      Breath sounds: Normal breath sounds.   Abdominal:      Palpations: Abdomen is soft.      Tenderness: There is no abdominal tenderness.   Musculoskeletal:      Right lower leg: No edema.      Left lower leg: No edema.      Comments: Exam with 5 out of 5 strength and normal sensation throughout bilateral upper extremities.  Patient has reproduction of his pain when palpating the ulnar nerve at his posterior olecranon process.  He has 2+ radial pulses that are equal bilaterally.  Spurling test positive on left.    Skin:     General: Skin is warm.      Capillary Refill: Capillary refill  takes less than 2 seconds.   Neurological:      Mental Status: He is alert and oriented to person, place, and time.   Psychiatric:         Mood and Affect: Mood normal.         Results Reviewed       None            XR chest 1 view portable    (Results Pending)   XR elbow 3+ vw LEFT    (Results Pending)       Procedures    ED Medication and Procedure Management   Prior to Admission Medications   Prescriptions Last Dose Informant Patient Reported? Taking?   Cholecalciferol (VITAMIN D3) 1,000 units tablet  Self No No   Sig: Take 1 tablet (1,000 Units total) by mouth daily   Desvenlafaxine Succinate ER 25 MG TB24  Self Yes No   Sig: Take 25 mg by mouth in the morning   Patient not taking: Reported on 6/19/2024   QUEtiapine (SEROquel) 50 mg tablet  Self Yes No   Sig: Take 50 mg by mouth daily at bedtime   clobetasol (TEMOVATE) 0.05 % ointment   No No   Sig: Apply topically 2 (two) times a day   Patient not taking: Reported on 9/11/2024   clonazePAM (KlonoPIN) 0.5 mg tablet  Self Yes No   Sig: Take 0.5 mg by mouth daily at bedtime .5 mg once at night, and half in morning   cycloSPORINE (RESTASIS) 0.05 % ophthalmic emulsion   Yes No   Patient not taking: Reported on 10/8/2024   dicyclomine (BENTYL) 10 mg capsule   No No   Sig: Take 1 capsule (10 mg total) by mouth 3 (three) times a day before meals for 10 days   Patient not taking: Reported on 9/11/2024   famotidine (PEPCID) 20 mg tablet   Yes No   Sig: Take 20 mg by mouth 2 (two) times a day   Patient not taking: Reported on 9/11/2024   methotrexate 2.5 MG tablet  Self Yes No   Sig: Take by mouth 5 tablets on Sundays   Patient not taking: Reported on 5/17/2024   pantoprazole (PROTONIX) 40 mg tablet  Self No No   Sig: Take 1 tablet (40 mg total) by mouth daily before breakfast   Patient not taking: Reported on 9/11/2024   simvastatin (ZOCOR) 10 mg tablet  Self No No   Sig: Take 1 tablet (10 mg total) by mouth daily at bedtime   vilazodone (VIIBRYD) 40 mg tablet  Self Yes  No   Sig: Take 40 mg by mouth daily with breakfast   vitamin B-12 (VITAMIN B-12) 1,000 mcg tablet  Self No No   Sig: Take 1 tablet (1,000 mcg total) by mouth daily      Facility-Administered Medications: None     Patient's Medications   Discharge Prescriptions    No medications on file     No discharge procedures on file.  ED SEPSIS DOCUMENTATION   Time reflects when diagnosis was documented in both MDM as applicable and the Disposition within this note       Time User Action Codes Description Comment    10/19/2024  7:34 AM Myra Martin [G56.22] Ulnar neuropathy at elbow of left upper extremity     10/19/2024  8:09 AM Myra Martin Remove [G56.22] Ulnar neuropathy at elbow of left upper extremity     10/19/2024  8:09 AM Myra Martin [M54.12] Cervical radiculopathy                  Myra Martin MD  10/19/24 [id number]

## 2024-10-19 NOTE — DISCHARGE INSTRUCTIONS
You were seen in the Emergency Department for: Left upper extremity pain    Your workup today showed: No evidence of heart attack on EKG and a reassuring chest x-ray and physical exam    Your next steps should include: call today to make an appointment with your primary care provider in one week    Reasons to RETURN IMMEDIATELY to the Emergency Department: worsening symptoms including new numbness/tingling/weakness, difficulty breathing, temperature > 100.4 degrees, uncontrollable nausea/vomiting, or any other concerns.

## 2024-10-20 LAB
ATRIAL RATE: 73 BPM
P AXIS: 63 DEGREES
PR INTERVAL: 182 MS
QRS AXIS: 52 DEGREES
QRSD INTERVAL: 82 MS
QT INTERVAL: 384 MS
QTC INTERVAL: 423 MS
T WAVE AXIS: 45 DEGREES
VENTRICULAR RATE: 73 BPM

## 2024-10-20 PROCEDURE — 93010 ELECTROCARDIOGRAM REPORT: CPT | Performed by: INTERNAL MEDICINE

## 2024-10-29 ENCOUNTER — TELEPHONE (OUTPATIENT)
Age: 69
End: 2024-10-29

## 2024-10-29 ENCOUNTER — TELEPHONE (OUTPATIENT)
Dept: INTERNAL MEDICINE CLINIC | Facility: CLINIC | Age: 69
End: 2024-10-29

## 2024-10-29 ENCOUNTER — OFFICE VISIT (OUTPATIENT)
Dept: INTERNAL MEDICINE CLINIC | Facility: CLINIC | Age: 69
End: 2024-10-29
Payer: MEDICARE

## 2024-10-29 VITALS
TEMPERATURE: 98 F | HEIGHT: 67 IN | WEIGHT: 164 LBS | HEART RATE: 82 BPM | DIASTOLIC BLOOD PRESSURE: 70 MMHG | BODY MASS INDEX: 25.74 KG/M2 | OXYGEN SATURATION: 97 % | SYSTOLIC BLOOD PRESSURE: 120 MMHG

## 2024-10-29 DIAGNOSIS — F33.9 DEPRESSION, RECURRENT (HCC): ICD-10-CM

## 2024-10-29 DIAGNOSIS — E78.2 MIXED HYPERLIPIDEMIA: ICD-10-CM

## 2024-10-29 DIAGNOSIS — R19.7 DIARRHEA, UNSPECIFIED TYPE: Primary | ICD-10-CM

## 2024-10-29 DIAGNOSIS — G47.33 OSA ON CPAP: ICD-10-CM

## 2024-10-29 DIAGNOSIS — E11.9 TYPE 2 DIABETES, HBA1C GOAL < 7% (HCC): ICD-10-CM

## 2024-10-29 DIAGNOSIS — E22.2 SYNDROME OF INAPPROPRIATE SECRETION OF ANTIDIURETIC HORMONE (HCC): Chronic | ICD-10-CM

## 2024-10-29 DIAGNOSIS — F42.8 OTHER OBSESSIVE-COMPULSIVE DISORDERS: ICD-10-CM

## 2024-10-29 PROCEDURE — 99214 OFFICE O/P EST MOD 30 MIN: CPT | Performed by: INTERNAL MEDICINE

## 2024-10-29 PROCEDURE — G2211 COMPLEX E/M VISIT ADD ON: HCPCS | Performed by: INTERNAL MEDICINE

## 2024-10-29 RX ORDER — CHOLESTYRAMINE 4 G/9G
1 POWDER, FOR SUSPENSION ORAL
Qty: 90 PACKET | Refills: 0 | Status: SHIPPED | OUTPATIENT
Start: 2024-10-29

## 2024-10-29 NOTE — PROGRESS NOTES
Assessment/Plan:             1. Diarrhea, unspecified type  -     cholestyramine (QUESTRAN) 4 g packet; Take 1 packet (4 g total) by mouth 3 (three) times a day with meals  2. Type 2 diabetes, HbA1c goal < 7% ([facility])  Comments:  continue diet and exercise  3. GONZALEZ on CPAP  4. Other obsessive-compulsive disorders  Comments:  continue same med  5. Depression, recurrent (HCC)  Comments:  continue same med  6. Mixed hyperlipidemia  7. ADH disorder         Subjective:      Patient ID: Juan Lucero is a 68 y.o. male.    Diarrhea off-and-on since last Wednesday, no blood involvement, no fever no chills, appetite no change,  no nausea no vomiting, no blood in the urine, no red or dark brown urine    Diarrhea   Pertinent negatives include no abdominal pain, arthralgias, chills, coughing, fever, headaches or vomiting.       The following portions of the patient's history were reviewed and updated as appropriate: He  has a past medical history of Anxiety, Depression, GERD (gastroesophageal reflux disease), Hyperlipidemia, Kidney stone, Polymyalgia rheumatica syndrome (HCC), and Psychiatric disorder.  He   Patient Active Problem List    Diagnosis Date Noted    GONZAELZ on CPAP 08/27/2024    GONZALEZ (obstructive sleep apnea) 06/11/2024    Type 2 diabetes, HbA1c goal < 7% ([facility]) 05/17/2024    Excessive daytime sleepiness 05/08/2024    Sleep disturbance 05/08/2024    Periodic limb movement 05/08/2024    Temporal arteritis (HCC) 02/02/2024    Left inguinal hernia 11/13/2023    Umbilical hernia without obstruction and without gangrene 11/03/2023    Gastroesophageal reflux disease without esophagitis 11/03/2023    Depression, recurrent (HCC) 05/23/2023    Preop exam for internal medicine 07/20/2022    Polymyalgia rheumatica syndrome (HCC) 05/18/2022    Impaired fasting blood sugar 05/18/2022    Other chest pain 11/17/2020    Pain in left knee 05/23/2018    Left facial numbness 12/18/2016    History of temporal arteritis 12/18/2016    HTN  (hypertension) 12/18/2016    Mixed hyperlipidemia 12/18/2016    Acute nonintractable headache 12/18/2016    Obsessive compulsive disorder 12/18/2016    Anxiety 12/18/2016    ADH disorder 12/18/2016     He  has a past surgical history that includes Appendectomy; Tonsillectomy; Kidney stone surgery; Transurethral resection of prostate; [place] tooth extraction; Colonoscopy; pr rpr 1st ingun hrna age 4 yrs/> reducible (Left, 2/16/2024); and pr rpr aa hernia 1st < 3 cm reducible (N/A, 2/16/2024).  His family history includes Cancer in his father and mother; Diabetes in his mother; Heart attack in his father; Heart disease in his father; Prostate cancer in his father.  He  reports that he has never smoked. He has never used smokeless tobacco. He reports that he does not drink alcohol and does not use drugs.  Current Outpatient Medications   Medication Sig Dispense Refill    Cholecalciferol (VITAMIN D3) 1,000 units tablet Take 1 tablet (1,000 Units total) by mouth daily 90 tablet 2    cholestyramine (QUESTRAN) 4 g packet Take 1 packet (4 g total) by mouth 3 (three) times a day with meals 90 packet 0    clonazePAM (KlonoPIN) 0.5 mg tablet Take 0.5 mg by mouth daily at bedtime .5 mg once at night, and half in morning      QUEtiapine (SEROquel) 50 mg tablet Take 50 mg by mouth daily at bedtime      simvastatin (ZOCOR) 10 mg tablet Take 1 tablet (10 mg total) by mouth daily at bedtime 90 tablet 1    vilazodone (VIIBRYD) 40 mg tablet Take 40 mg by mouth daily with breakfast      vitamin B-12 (VITAMIN B-12) 1,000 mcg tablet Take 1 tablet (1,000 mcg total) by mouth daily 90 tablet 1    clobetasol (TEMOVATE) 0.05 % ointment Apply topically 2 (two) times a day (Patient not taking: Reported on 9/11/2024) 120 g 1    cycloSPORINE (RESTASIS) 0.05 % ophthalmic emulsion  (Patient not taking: Reported on 10/8/2024)      Desvenlafaxine Succinate ER 25 MG TB24 Take 25 mg by mouth in the morning (Patient not taking: Reported on 6/19/2024)       dicyclomine (BENTYL) 10 mg capsule Take 1 capsule (10 mg total) by mouth 3 (three) times a day before meals for 10 days (Patient not taking: Reported on 9/11/2024) 30 capsule 0    famotidine (PEPCID) 20 mg tablet Take 20 mg by mouth 2 (two) times a day (Patient not taking: Reported on 9/11/2024)      methotrexate 2.5 MG tablet Take by mouth 5 tablets on Sundays (Patient not taking: Reported on 5/17/2024)      pantoprazole (PROTONIX) 40 mg tablet Take 1 tablet (40 mg total) by mouth daily before breakfast (Patient not taking: Reported on 9/11/2024) 30 tablet 0     No current facility-administered medications for this visit.     Current Outpatient Medications on File Prior to Visit   Medication Sig    Cholecalciferol (VITAMIN D3) 1,000 units tablet Take 1 tablet (1,000 Units total) by mouth daily    clonazePAM (KlonoPIN) 0.5 mg tablet Take 0.5 mg by mouth daily at bedtime .5 mg once at night, and half in morning    QUEtiapine (SEROquel) 50 mg tablet Take 50 mg by mouth daily at bedtime    simvastatin (ZOCOR) 10 mg tablet Take 1 tablet (10 mg total) by mouth daily at bedtime    vilazodone (VIIBRYD) 40 mg tablet Take 40 mg by mouth daily with breakfast    vitamin B-12 (VITAMIN B-12) 1,000 mcg tablet Take 1 tablet (1,000 mcg total) by mouth daily    clobetasol (TEMOVATE) 0.05 % ointment Apply topically 2 (two) times a day (Patient not taking: Reported on 9/11/2024)    cycloSPORINE (RESTASIS) 0.05 % ophthalmic emulsion  (Patient not taking: Reported on 10/8/2024)    Desvenlafaxine Succinate ER 25 MG TB24 Take 25 mg by mouth in the morning (Patient not taking: Reported on 6/19/2024)    dicyclomine (BENTYL) 10 mg capsule Take 1 capsule (10 mg total) by mouth 3 (three) times a day before meals for 10 days (Patient not taking: Reported on 9/11/2024)    famotidine (PEPCID) 20 mg tablet Take 20 mg by mouth 2 (two) times a day (Patient not taking: Reported on 9/11/2024)    methotrexate 2.5 MG tablet Take by mouth 5 tablets on  "Sundays (Patient not taking: Reported on 5/17/2024)    pantoprazole (PROTONIX) 40 mg tablet Take 1 tablet (40 mg total) by mouth daily before breakfast (Patient not taking: Reported on 9/11/2024)     No current facility-administered medications on file prior to visit.     He has No Known Allergies..    Review of Systems   Constitutional:  Negative for chills and fever.   HENT:  Negative for congestion, ear pain and sore throat.    Eyes:  Negative for pain.   Respiratory:  Negative for cough and shortness of breath.    Cardiovascular:  Negative for chest pain and leg swelling.   Gastrointestinal:  Positive for diarrhea. Negative for abdominal pain, blood in stool, nausea and vomiting.   Endocrine: Negative for polyuria.   Genitourinary:  Negative for difficulty urinating, frequency and urgency.   Musculoskeletal:  Negative for arthralgias and back pain.   Skin:  Negative for rash.   Neurological:  Negative for weakness and headaches.   Psychiatric/Behavioral:  Negative for sleep disturbance. The patient is not nervous/anxious.          Objective:      /70 (BP Location: Left arm, Patient Position: Sitting, Cuff Size: Standard)   Pulse 82   Temp 98 °F (36.7 °C) (Temporal)   Ht 5' 7\" (1.702 m)   Wt 74.4 kg (164 lb)   SpO2 97%   BMI 25.69 kg/m²     Recent Results (from the past 1344 hour(s))   ECG 12 lead    Collection Time: 10/19/24  7:26 AM   Result Value Ref Range    Ventricular Rate 73 BPM    Atrial Rate 73 BPM    MS Interval 182 ms    QRSD Interval 82 ms    QT Interval 384 ms    QTC Interval 423 ms    P [place] 63 degrees    QRS Axis 52 degrees    T Wave Axis 45 degrees        Physical Exam  Constitutional:       Appearance: Normal appearance.   HENT:      Head: Normocephalic.      Right Ear: External ear normal.      Left Ear: External ear normal.      Nose: Nose normal. No congestion.      Mouth/Throat:      Mouth: Mucous membranes are moist.      Pharynx: Oropharynx is clear. No oropharyngeal exudate or " posterior oropharyngeal erythema.   Eyes:      Extraocular Movements: Extraocular movements intact.      Conjunctiva/sclera: Conjunctivae normal.   Cardiovascular:      Rate and Rhythm: Normal rate and regular rhythm.      Heart sounds: Normal heart sounds. No murmur heard.  Pulmonary:      Effort: Pulmonary effort is normal.      Breath sounds: Normal breath sounds. No wheezing or rales.   Abdominal:      General: Abdomen is flat. There is no distension.      Palpations: Abdomen is soft.      Tenderness: There is no abdominal tenderness.   Musculoskeletal:         General: Normal range of motion.      Cervical back: Normal range of motion and neck supple.      Right lower leg: No edema.      Left lower leg: No edema.   Lymphadenopathy:      Cervical: No cervical adenopathy.   Skin:     General: Skin is warm.   Neurological:      General: No focal deficit present.      Mental Status: He is alert and oriented to person, place, and time.

## 2024-11-06 ENCOUNTER — TELEPHONE (OUTPATIENT)
Age: 69
End: 2024-11-06

## 2024-11-06 NOTE — TELEPHONE ENCOUNTER
Pt called in stated his diarrhea has subsided still moving bowels a lot. Also stated his stomach is very uncomfortable, would like a call back from doctor luan.

## 2024-11-14 DIAGNOSIS — K21.9 GASTROESOPHAGEAL REFLUX DISEASE WITHOUT ESOPHAGITIS: ICD-10-CM

## 2024-11-14 RX ORDER — PANTOPRAZOLE SODIUM 40 MG/1
40 TABLET, DELAYED RELEASE ORAL
Qty: 30 TABLET | Refills: 0 | Status: SHIPPED | OUTPATIENT
Start: 2024-11-14 | End: 2025-05-13

## 2024-11-14 NOTE — TELEPHONE ENCOUNTER
Pt called stating the famotidine (PEPCID) 20 mg tablet is not working at all. Pt has used for one week and is not feeling better. Please call pt to advise of another medication or if pcp wants pt to be seen in office.  Pt# [phone]

## 2024-12-03 ENCOUNTER — TELEPHONE (OUTPATIENT)
Age: 69
End: 2024-12-03

## 2024-12-03 NOTE — TELEPHONE ENCOUNTER
Would like to know if the current labs are for his appointment on 12/19/24 or is it for next year. He has a follow up appointment on 2/12/25 and a medicare annual wellness visit on 10/14/25.     He also wanted to know what the medication Desvenlafaxine was for. Please call Param at [phone] .

## 2024-12-11 ENCOUNTER — APPOINTMENT (OUTPATIENT)
Dept: LAB | Facility: CLINIC | Age: 69
End: 2024-12-11
Payer: MEDICARE

## 2024-12-11 DIAGNOSIS — E11.9 TYPE 2 DIABETES, HBA1C GOAL < 7% (HCC): ICD-10-CM

## 2024-12-11 DIAGNOSIS — E78.2 MIXED HYPERLIPIDEMIA: ICD-10-CM

## 2024-12-11 LAB
ALBUMIN SERPL BCG-MCNC: 4.3 G/DL (ref 3.5–5)
ALP SERPL-CCNC: 89 U/L (ref 34–104)
ALT SERPL W P-5'-P-CCNC: 16 U/L (ref 7–52)
ANION GAP SERPL CALCULATED.3IONS-SCNC: 5 MMOL/L (ref 4–13)
AST SERPL W P-5'-P-CCNC: 19 U/L (ref 13–39)
BASOPHILS # BLD AUTO: 0.04 THOUSANDS/ÂΜL (ref 0–0.1)
BASOPHILS NFR BLD AUTO: 1 % (ref 0–1)
BILIRUB SERPL-MCNC: 0.88 MG/DL (ref 0.2–1)
BUN SERPL-MCNC: 17 MG/DL (ref 5–25)
CALCIUM SERPL-MCNC: 9.4 MG/DL (ref 8.4–10.2)
CHLORIDE SERPL-SCNC: 103 MMOL/L (ref 96–108)
CHOLEST SERPL-MCNC: 176 MG/DL (ref ?–200)
CO2 SERPL-SCNC: 32 MMOL/L (ref 21–32)
CREAT SERPL-MCNC: 0.99 MG/DL (ref 0.6–1.3)
CREAT UR-MCNC: 72.1 MG/DL
EOSINOPHIL # BLD AUTO: 0.11 THOUSAND/ÂΜL (ref 0–0.61)
EOSINOPHIL NFR BLD AUTO: 2 % (ref 0–6)
ERYTHROCYTE [DISTWIDTH] IN BLOOD BY AUTOMATED COUNT: 12.8 % (ref 11.6–15.1)
EST. AVERAGE GLUCOSE BLD GHB EST-MCNC: 146 MG/DL
GFR SERPL CREATININE-BSD FRML MDRD: 77 ML/MIN/1.73SQ M
GLUCOSE P FAST SERPL-MCNC: 100 MG/DL (ref 65–99)
HBA1C MFR BLD: 6.7 %
HCT VFR BLD AUTO: 46.2 % (ref 36.5–49.3)
HDLC SERPL-MCNC: 47 MG/DL
HGB BLD-MCNC: 15.4 G/DL (ref 12–17)
IMM GRANULOCYTES # BLD AUTO: 0.01 THOUSAND/UL (ref 0–0.2)
IMM GRANULOCYTES NFR BLD AUTO: 0 % (ref 0–2)
LDLC SERPL CALC-MCNC: 112 MG/DL (ref 0–100)
LYMPHOCYTES # BLD AUTO: 1.57 THOUSANDS/ÂΜL (ref 0.6–4.47)
LYMPHOCYTES NFR BLD AUTO: 33 % (ref 14–44)
MCH RBC QN AUTO: 28.8 PG (ref 26.8–34.3)
MCHC RBC AUTO-ENTMCNC: 33.3 G/DL (ref 31.4–37.4)
MCV RBC AUTO: 87 FL (ref 82–98)
MICROALBUMIN UR-MCNC: 8.3 MG/L
MICROALBUMIN/CREAT 24H UR: 12 MG/G CREATININE (ref 0–30)
MONOCYTES # BLD AUTO: 0.38 THOUSAND/ÂΜL (ref 0.17–1.22)
MONOCYTES NFR BLD AUTO: 8 % (ref 4–12)
NEUTROPHILS # BLD AUTO: 2.6 THOUSANDS/ÂΜL (ref 1.85–7.62)
NEUTS SEG NFR BLD AUTO: 56 % (ref 43–75)
NRBC BLD AUTO-RTO: 0 /100 WBCS
PLATELET # BLD AUTO: 221 THOUSANDS/UL (ref 149–390)
PMV BLD AUTO: 10 FL (ref 8.9–12.7)
POTASSIUM SERPL-SCNC: 4 MMOL/L (ref 3.5–5.3)
PROT SERPL-MCNC: 6.5 G/DL (ref 6.4–8.4)
RBC # BLD AUTO: 5.34 MILLION/UL (ref 3.88–5.62)
SODIUM SERPL-SCNC: 140 MMOL/L (ref 135–147)
TRIGL SERPL-MCNC: 86 MG/DL (ref ?–150)
TSH SERPL DL<=0.05 MIU/L-ACNC: 1.36 UIU/ML (ref 0.45–4.5)
WBC # BLD AUTO: 4.71 THOUSAND/UL (ref 4.31–10.16)

## 2024-12-11 PROCEDURE — 82570 ASSAY OF URINE CREATININE: CPT

## 2024-12-11 PROCEDURE — 84443 ASSAY THYROID STIM HORMONE: CPT

## 2024-12-11 PROCEDURE — 80061 LIPID PANEL: CPT

## 2024-12-11 PROCEDURE — 82043 UR ALBUMIN QUANTITATIVE: CPT

## 2024-12-11 PROCEDURE — 83036 HEMOGLOBIN GLYCOSYLATED A1C: CPT

## 2024-12-11 PROCEDURE — 85025 COMPLETE CBC W/AUTO DIFF WBC: CPT

## 2024-12-11 PROCEDURE — 36415 COLL VENOUS BLD VENIPUNCTURE: CPT

## 2024-12-11 PROCEDURE — 80053 COMPREHEN METABOLIC PANEL: CPT

## 2024-12-13 ENCOUNTER — TELEPHONE (OUTPATIENT)
Age: 69
End: 2024-12-13

## 2024-12-13 NOTE — TELEPHONE ENCOUNTER
Pt calling to confirm he will need to have PSA completed prior to upcoming appt in February. Confirmed he will need labs and notes on chart state that clinical had placed order in his chart to have completed.     No further questions at this time.

## 2024-12-19 ENCOUNTER — OFFICE VISIT (OUTPATIENT)
Dept: INTERNAL MEDICINE CLINIC | Facility: CLINIC | Age: 69
End: 2024-12-19
Payer: MEDICARE

## 2024-12-19 VITALS
WEIGHT: 164.2 LBS | BODY MASS INDEX: 25.77 KG/M2 | DIASTOLIC BLOOD PRESSURE: 72 MMHG | TEMPERATURE: 98.6 F | HEIGHT: 67 IN | SYSTOLIC BLOOD PRESSURE: 124 MMHG | HEART RATE: 90 BPM | OXYGEN SATURATION: 98 %

## 2024-12-19 DIAGNOSIS — G47.33 OSA ON CPAP: ICD-10-CM

## 2024-12-19 DIAGNOSIS — M35.3 POLYMYALGIA RHEUMATICA SYNDROME (HCC): ICD-10-CM

## 2024-12-19 DIAGNOSIS — E78.2 MIXED HYPERLIPIDEMIA: ICD-10-CM

## 2024-12-19 DIAGNOSIS — E11.9 TYPE 2 DIABETES, HBA1C GOAL < 7% (HCC): Primary | ICD-10-CM

## 2024-12-19 DIAGNOSIS — K21.9 GASTROESOPHAGEAL REFLUX DISEASE WITHOUT ESOPHAGITIS: ICD-10-CM

## 2024-12-19 DIAGNOSIS — F33.9 DEPRESSION, RECURRENT (HCC): ICD-10-CM

## 2024-12-19 PROCEDURE — 99214 OFFICE O/P EST MOD 30 MIN: CPT | Performed by: INTERNAL MEDICINE

## 2024-12-19 PROCEDURE — G2211 COMPLEX E/M VISIT ADD ON: HCPCS | Performed by: INTERNAL MEDICINE

## 2024-12-19 RX ORDER — PANTOPRAZOLE SODIUM 40 MG/1
40 TABLET, DELAYED RELEASE ORAL
Qty: 30 TABLET | Refills: 0 | Status: SHIPPED | OUTPATIENT
Start: 2024-12-19 | End: 2025-06-17

## 2024-12-19 NOTE — PROGRESS NOTES
Assessment/Plan:             1. Type 2 diabetes, HbA1c goal < 7% ([facility])  Comments:  diet and exercise discussed  2. Gastroesophageal reflux disease without esophagitis  Comments:  continue same med  Orders:  -     pantoprazole (PROTONIX) 40 mg tablet; Take 1 tablet (40 mg total) by mouth daily before breakfast  3. GONZALEZ on CPAP  Comments:  continue CPAP  4. Polymyalgia rheumatica syndrome (HCC)  5. Depression, recurrent (HCC)  Comments:  continue same med  6. Mixed hyperlipidemia  Comments:  continue same med         Subjective:      Patient ID: Juan Lucero is a 69 y.o. male.    Follow-up on blood test done on 12/11/2024 test discussed with him        The following portions of the patient's history were reviewed and updated as appropriate: He  has a past medical history of Anxiety, Depression, GERD (gastroesophageal reflux disease), Hyperlipidemia, Kidney stone, Polymyalgia rheumatica syndrome (HCC), and Psychiatric disorder.  He   Patient Active Problem List    Diagnosis Date Noted    GONZALEZ on CPAP 08/27/2024    GONZALEZ (obstructive sleep apnea) 06/11/2024    Type 2 diabetes, HbA1c goal < 7% (HCC) 05/17/2024    Excessive daytime sleepiness 05/08/2024    Sleep disturbance 05/08/2024    Periodic limb movement 05/08/2024    Temporal arteritis (HCC) 02/02/2024    Left inguinal hernia 11/13/2023    Umbilical hernia without obstruction and without gangrene 11/03/2023    Gastroesophageal reflux disease without esophagitis 11/03/2023    Depression, recurrent (HCC) 05/23/2023    Preop exam for internal medicine 07/20/2022    Polymyalgia rheumatica syndrome (HCC) 05/18/2022    Impaired fasting blood sugar 05/18/2022    Other chest pain 11/17/2020    Pain in left knee 05/23/2018    Left facial numbness 12/18/2016    History of temporal arteritis 12/18/2016    HTN (hypertension) 12/18/2016    Mixed hyperlipidemia 12/18/2016    Acute nonintractable headache 12/18/2016    Obsessive compulsive disorder 12/18/2016    Anxiety  12/18/2016    ADH disorder 12/18/2016     He  has a past surgical history that includes Appendectomy; Tonsillectomy; Kidney stone surgery; Transurethral resection of prostate; [place] tooth extraction; Colonoscopy; pr rpr 1st ingun hrna age 4 yrs/> reducible (Left, 2/16/2024); and pr rpr aa hernia 1st < 3 cm reducible (N/A, 2/16/2024).  His family history includes Cancer in his father and mother; Diabetes in his mother; Heart attack in his father; Heart disease in his father; Prostate cancer in his father.  He  reports that he has never smoked. He has never used smokeless tobacco. He reports that he does not drink alcohol and does not use drugs.  Current Outpatient Medications   Medication Sig Dispense Refill    clonazePAM (KlonoPIN) 0.5 mg tablet Take 0.5 mg by mouth daily at bedtime .5 mg once at night, and half in morning      pantoprazole (PROTONIX) 40 mg tablet Take 1 tablet (40 mg total) by mouth daily before breakfast 30 tablet 0    QUEtiapine (SEROquel) 50 mg tablet Take 50 mg by mouth daily at bedtime      simvastatin (ZOCOR) 10 mg tablet Take 1 tablet (10 mg total) by mouth daily at bedtime 90 tablet 1    vilazodone (VIIBRYD) 40 mg tablet Take 40 mg by mouth daily with breakfast      cholestyramine (QUESTRAN) 4 g packet Take 1 packet (4 g total) by mouth 3 (three) times a day with meals (Patient not taking: Reported on 12/19/2024) 90 packet 0    clobetasol (TEMOVATE) 0.05 % ointment Apply topically 2 (two) times a day (Patient not taking: Reported on 9/11/2024) 120 g 1    cycloSPORINE (RESTASIS) 0.05 % ophthalmic emulsion  (Patient not taking: Reported on 10/8/2024)      dicyclomine (BENTYL) 10 mg capsule Take 1 capsule (10 mg total) by mouth 3 (three) times a day before meals for 10 days (Patient not taking: Reported on 12/19/2024) 30 capsule 0    famotidine (PEPCID) 20 mg tablet Take 20 mg by mouth 2 (two) times a day (Patient not taking: Reported on 9/11/2024)      methotrexate 2.5 MG tablet Take by  mouth 5 tablets on Sundays (Patient not taking: Reported on 5/17/2024)      vitamin B-12 (VITAMIN B-12) 1,000 mcg tablet Take 1 tablet (1,000 mcg total) by mouth daily (Patient not taking: Reported on 12/19/2024) 90 tablet 1     No current facility-administered medications for this visit.     Current Outpatient Medications on File Prior to Visit   Medication Sig    clonazePAM (KlonoPIN) 0.5 mg tablet Take 0.5 mg by mouth daily at bedtime .5 mg once at night, and half in morning    QUEtiapine (SEROquel) 50 mg tablet Take 50 mg by mouth daily at bedtime    simvastatin (ZOCOR) 10 mg tablet Take 1 tablet (10 mg total) by mouth daily at bedtime    vilazodone (VIIBRYD) 40 mg tablet Take 40 mg by mouth daily with breakfast    [DISCONTINUED] pantoprazole (PROTONIX) 40 mg tablet Take 1 tablet (40 mg total) by mouth daily before breakfast    cholestyramine (QUESTRAN) 4 g packet Take 1 packet (4 g total) by mouth 3 (three) times a day with meals (Patient not taking: Reported on 12/19/2024)    clobetasol (TEMOVATE) 0.05 % ointment Apply topically 2 (two) times a day (Patient not taking: Reported on 9/11/2024)    cycloSPORINE (RESTASIS) 0.05 % ophthalmic emulsion  (Patient not taking: Reported on 10/8/2024)    dicyclomine (BENTYL) 10 mg capsule Take 1 capsule (10 mg total) by mouth 3 (three) times a day before meals for 10 days (Patient not taking: Reported on 12/19/2024)    famotidine (PEPCID) 20 mg tablet Take 20 mg by mouth 2 (two) times a day (Patient not taking: Reported on 9/11/2024)    methotrexate 2.5 MG tablet Take by mouth 5 tablets on Sundays (Patient not taking: Reported on 5/17/2024)    vitamin B-12 (VITAMIN B-12) 1,000 mcg tablet Take 1 tablet (1,000 mcg total) by mouth daily (Patient not taking: Reported on 12/19/2024)    [DISCONTINUED] Cholecalciferol (VITAMIN D3) 1,000 units tablet Take 1 tablet (1,000 Units total) by mouth daily (Patient not taking: Reported on 12/19/2024)    [DISCONTINUED] Desvenlafaxine  "Succinate ER 25 MG TB24 Take 25 mg by mouth in the morning (Patient not taking: Reported on 6/19/2024)     No current facility-administered medications on file prior to visit.     He has no known allergies..    Review of Systems   Constitutional:  Negative for chills and fever.   HENT:  Negative for congestion, ear pain and sore throat.    Eyes:  Negative for pain.   Respiratory:  Negative for cough and shortness of breath.    Cardiovascular:  Negative for chest pain and leg swelling.   Gastrointestinal:  Negative for abdominal pain, nausea and vomiting.   Endocrine: Negative for polyuria.   Genitourinary:  Negative for difficulty urinating, frequency and urgency.   Musculoskeletal:  Negative for arthralgias and back pain.   Skin:  Negative for rash.   Neurological:  Negative for weakness and headaches.   Psychiatric/Behavioral:  Negative for sleep disturbance. The patient is not nervous/anxious.          Objective:      /72 (BP Location: Right arm, Patient Position: Sitting, Cuff Size: Standard)   Pulse 90   Temp 98.6 °F (37 °C) (Temporal)   Ht 5' 7\" (1.702 m)   Wt 74.5 kg (164 lb 3.2 oz)   SpO2 98%   BMI 25.72 kg/m²     Recent Results (from the past 8 weeks)   Albumin / creatinine urine ratio    Collection Time: 12/11/24  9:34 AM   Result Value Ref Range    Creatinine, Ur 72.1 Reference range not established. mg/dL    Albumin,U,Random 8.3 <20.0 mg/L    Albumin Creat Ratio 12 0 - 30 mg/g creatinine   CBC and differential    Collection Time: 12/11/24  9:34 AM   Result Value Ref Range    WBC 4.71 4.31 - 10.16 Thousand/uL    RBC 5.34 3.88 - 5.62 Million/uL    Hemoglobin 15.4 12.0 - 17.0 g/dL    Hematocrit 46.2 36.5 - 49.3 %    MCV 87 82 - 98 fL    MCH 28.8 26.8 - 34.3 pg    MCHC 33.3 31.4 - 37.4 g/dL    RDW 12.8 11.6 - 15.1 %    MPV 10.0 8.9 - 12.7 fL    Platelets 221 149 - 390 Thousands/uL    nRBC 0 /100 WBCs    Segmented % 56 43 - 75 %    Immature Grans % 0 0 - 2 %    Lymphocytes % 33 14 - 44 %    " Monocytes % 8 4 - 12 %    Eosinophils Relative 2 0 - 6 %    Basophils Relative 1 0 - 1 %    Absolute Neutrophils 2.60 1.85 - 7.62 Thousands/µL    Absolute Immature Grans 0.01 0.00 - 0.20 Thousand/uL    Absolute Lymphocytes 1.57 0.60 - 4.47 Thousands/µL    Absolute Monocytes 0.38 0.17 - 1.22 Thousand/µL    Eosinophils Absolute 0.11 0.00 - 0.61 Thousand/µL    Basophils Absolute 0.04 0.00 - 0.10 Thousands/µL   Comprehensive metabolic panel    Collection Time: 12/11/24  9:34 AM   Result Value Ref Range    Sodium 140 135 - 147 mmol/L    Potassium 4.0 3.5 - 5.3 mmol/L    Chloride 103 96 - 108 mmol/L    CO2 32 21 - 32 mmol/L    ANION GAP 5 4 - 13 mmol/L    BUN 17 5 - 25 mg/dL    Creatinine 0.99 0.60 - 1.30 mg/dL    Glucose, Fasting 100 (H) 65 - 99 mg/dL    Calcium 9.4 8.4 - 10.2 mg/dL    AST 19 13 - 39 U/L    ALT 16 7 - 52 U/L    Alkaline Phosphatase 89 34 - 104 U/L    Total Protein 6.5 6.4 - 8.4 g/dL    Albumin 4.3 3.5 - 5.0 g/dL    Total Bilirubin 0.88 0.20 - 1.00 mg/dL    eGFR 77 ml/min/1.73sq m   Lipid Panel with Direct LDL reflex    Collection Time: 12/11/24  9:34 AM   Result Value Ref Range    Cholesterol 176 See Comment mg/dL    Triglycerides 86 See Comment mg/dL    HDL, Direct 47 >=40 mg/dL    LDL Calculated 112 (H) 0 - 100 mg/dL   TSH, 3rd generation    Collection Time: 12/11/24  9:34 AM   Result Value Ref Range    TSH 3RD GENERATON 1.357 0.450 - 4.500 uIU/mL   Hemoglobin A1C    Collection Time: 12/11/24  9:34 AM   Result Value Ref Range    Hemoglobin A1C 6.7 (H) Normal 4.0-5.6%; PreDiabetic 5.7-6.4%; Diabetic >=6.5%; Glycemic control for adults with diabetes <7.0% %     mg/dl        Physical Exam  Constitutional:       Appearance: Normal appearance.   HENT:      Head: Normocephalic.      Right Ear: Tympanic membrane, ear canal and external ear normal.      Left Ear: Tympanic membrane, ear canal and external ear normal.      Nose: Nose normal. No congestion.      Mouth/Throat:      Mouth: Mucous membranes  are moist.      Pharynx: Oropharynx is clear. No oropharyngeal exudate or posterior oropharyngeal erythema.   Eyes:      Extraocular Movements: Extraocular movements intact.      Conjunctiva/sclera: Conjunctivae normal.   Cardiovascular:      Rate and Rhythm: Normal rate and regular rhythm.      Heart sounds: Normal heart sounds. No murmur heard.  Pulmonary:      Effort: Pulmonary effort is normal.      Breath sounds: Normal breath sounds. No wheezing or rales.   Abdominal:      General: Abdomen is flat. There is no distension.      Palpations: Abdomen is soft.      Tenderness: There is no abdominal tenderness.   Musculoskeletal:         General: Normal range of motion.      Cervical back: Normal range of motion and neck supple.      Right lower leg: No edema.      Left lower leg: No edema.   Lymphadenopathy:      Cervical: No cervical adenopathy.   Skin:     General: Skin is warm.   Neurological:      General: No focal deficit present.      Mental Status: He is alert and oriented to person, place, and time.

## 2025-01-06 ENCOUNTER — OFFICE VISIT (OUTPATIENT)
Dept: INTERNAL MEDICINE CLINIC | Facility: CLINIC | Age: 70
End: 2025-01-06
Payer: MEDICARE

## 2025-01-06 VITALS
SYSTOLIC BLOOD PRESSURE: 112 MMHG | WEIGHT: 165 LBS | DIASTOLIC BLOOD PRESSURE: 80 MMHG | TEMPERATURE: 97.7 F | HEART RATE: 92 BPM | BODY MASS INDEX: 25.9 KG/M2 | OXYGEN SATURATION: 94 % | HEIGHT: 67 IN

## 2025-01-06 DIAGNOSIS — F33.9 DEPRESSION, RECURRENT (HCC): ICD-10-CM

## 2025-01-06 DIAGNOSIS — E11.9 DIABETIC EYE EXAM (HCC): ICD-10-CM

## 2025-01-06 DIAGNOSIS — J30.89 NON-SEASONAL ALLERGIC RHINITIS DUE TO OTHER ALLERGIC TRIGGER: Primary | ICD-10-CM

## 2025-01-06 DIAGNOSIS — M35.3 POLYMYALGIA RHEUMATICA SYNDROME (HCC): ICD-10-CM

## 2025-01-06 DIAGNOSIS — E22.2 SYNDROME OF INAPPROPRIATE SECRETION OF ANTIDIURETIC HORMONE (HCC): ICD-10-CM

## 2025-01-06 DIAGNOSIS — M31.6 TEMPORAL ARTERITIS (HCC): ICD-10-CM

## 2025-01-06 DIAGNOSIS — M79.672 LEFT FOOT PAIN: ICD-10-CM

## 2025-01-06 DIAGNOSIS — Z01.00 DIABETIC EYE EXAM (HCC): ICD-10-CM

## 2025-01-06 PROBLEM — J30.9 ALLERGIC RHINITIS DUE TO ALLERGEN: Status: ACTIVE | Noted: 2025-01-06

## 2025-01-06 PROCEDURE — G2211 COMPLEX E/M VISIT ADD ON: HCPCS | Performed by: INTERNAL MEDICINE

## 2025-01-06 PROCEDURE — 99214 OFFICE O/P EST MOD 30 MIN: CPT | Performed by: INTERNAL MEDICINE

## 2025-01-06 RX ORDER — FLUTICASONE PROPIONATE 50 MCG
1 SPRAY, SUSPENSION (ML) NASAL DAILY
Qty: 16 G | Refills: 0 | Status: SHIPPED | OUTPATIENT
Start: 2025-01-06

## 2025-01-06 NOTE — PROGRESS NOTES
Assessment/Plan:      Depression Screening and Follow-up Plan:   Patient's depression screening was negative with an [place]  Depression Scale score of  .           1. Non-seasonal allergic rhinitis due to other allergic trigger  -     fluticasone (FLONASE) 50 mcg/act nasal spray; 1 spray into each nostril daily  2. Left foot pain  3. Diabetic eye exam (HCC)  -     Ambulatory Referral to Ophthalmology; Future  4. Polymyalgia rheumatica syndrome (HCC)  5. Depression, recurrent (HCC)  Comments:  continue same med  6. Temporal arteritis ([facility])  7. Syndrome of inappropriate secretion of antidiuretic hormone (HCC)         Subjective:      Patient ID: Juan Lucero is a 69 y.o. male.    Nose blockage, ear congestion, also left foot pain, no trauma        The following portions of the patient's history were reviewed and updated as appropriate: He  has a past medical history of Anxiety, Depression, GERD (gastroesophageal reflux disease), Hyperlipidemia, Kidney stone, Polymyalgia rheumatica syndrome (HCC), and Psychiatric disorder.  He   Patient Active Problem List    Diagnosis Date Noted    Allergic rhinitis due to allergen 2025    GONZALEZ on CPAP 2024    GONZALEZ (obstructive sleep apnea) 2024    Type 2 diabetes, HbA1c goal < 7% ([facility]) 2024    Excessive daytime sleepiness 2024    Sleep disturbance 2024    Periodic limb movement 2024    Temporal arteritis ([facility]) 2024    Left inguinal hernia 2023    Umbilical hernia without obstruction and without gangrene 2023    Gastroesophageal reflux disease without esophagitis 2023    Depression, recurrent (HCC) 2023    Preop exam for internal medicine 2022    Polymyalgia rheumatica syndrome (HCC) 2022    Impaired fasting blood sugar 2022    Other chest pain 2020    Pain in left knee 2018    Left facial numbness 2016    History of temporal arteritis 2016    HTN  (hypertension) 12/18/2016    Mixed hyperlipidemia 12/18/2016    Acute nonintractable headache 12/18/2016    Obsessive compulsive disorder 12/18/2016    Anxiety 12/18/2016    ADH disorder 12/18/2016     He  has a past surgical history that includes Appendectomy; Tonsillectomy; Kidney stone surgery; Transurethral resection of prostate; [place] tooth extraction; Colonoscopy; pr rpr 1st ingun hrna age 4 yrs/> reducible (Left, 2/16/2024); and pr rpr aa hernia 1st < 3 cm reducible (N/A, 2/16/2024).  His family history includes Cancer in his father and mother; Diabetes in his mother; Heart attack in his father; Heart disease in his father; Prostate cancer in his father.  He  reports that he has never smoked. He has never used smokeless tobacco. He reports that he does not drink alcohol and does not use drugs.  Current Outpatient Medications   Medication Sig Dispense Refill    clonazePAM (KlonoPIN) 0.5 mg tablet Take 0.5 mg by mouth daily at bedtime .5 mg once at night, and half in morning      fluticasone (FLONASE) 50 mcg/act nasal spray 1 spray into each nostril daily 16 g 0    pantoprazole (PROTONIX) 40 mg tablet Take 1 tablet (40 mg total) by mouth daily before breakfast 30 tablet 0    QUEtiapine (SEROquel) 50 mg tablet Take 50 mg by mouth daily at bedtime      simvastatin (ZOCOR) 10 mg tablet Take 1 tablet (10 mg total) by mouth daily at bedtime 90 tablet 1    vilazodone (VIIBRYD) 40 mg tablet Take 40 mg by mouth daily with breakfast      cholestyramine (QUESTRAN) 4 g packet Take 1 packet (4 g total) by mouth 3 (three) times a day with meals (Patient not taking: Reported on 1/6/2025) 90 packet 0    clobetasol (TEMOVATE) 0.05 % ointment Apply topically 2 (two) times a day (Patient not taking: Reported on 9/11/2024) 120 g 1    cycloSPORINE (RESTASIS) 0.05 % ophthalmic emulsion  (Patient not taking: Reported on 10/8/2024)      dicyclomine (BENTYL) 10 mg capsule Take 1 capsule (10 mg total) by mouth 3 (three) times a day  before meals for 10 days (Patient not taking: Reported on 12/19/2024) 30 capsule 0    famotidine (PEPCID) 20 mg tablet Take 20 mg by mouth 2 (two) times a day (Patient not taking: Reported on 9/11/2024)      methotrexate 2.5 MG tablet Take by mouth 5 tablets on Sundays (Patient not taking: Reported on 5/17/2024)      vitamin B-12 (VITAMIN B-12) 1,000 mcg tablet Take 1 tablet (1,000 mcg total) by mouth daily (Patient not taking: Reported on 12/19/2024) 90 tablet 1     No current facility-administered medications for this visit.     Current Outpatient Medications on File Prior to Visit   Medication Sig    clonazePAM (KlonoPIN) 0.5 mg tablet Take 0.5 mg by mouth daily at bedtime .5 mg once at night, and half in morning    pantoprazole (PROTONIX) 40 mg tablet Take 1 tablet (40 mg total) by mouth daily before breakfast    QUEtiapine (SEROquel) 50 mg tablet Take 50 mg by mouth daily at bedtime    simvastatin (ZOCOR) 10 mg tablet Take 1 tablet (10 mg total) by mouth daily at bedtime    vilazodone (VIIBRYD) 40 mg tablet Take 40 mg by mouth daily with breakfast    cholestyramine (QUESTRAN) 4 g packet Take 1 packet (4 g total) by mouth 3 (three) times a day with meals (Patient not taking: Reported on 1/6/2025)    clobetasol (TEMOVATE) 0.05 % ointment Apply topically 2 (two) times a day (Patient not taking: Reported on 9/11/2024)    cycloSPORINE (RESTASIS) 0.05 % ophthalmic emulsion  (Patient not taking: Reported on 10/8/2024)    dicyclomine (BENTYL) 10 mg capsule Take 1 capsule (10 mg total) by mouth 3 (three) times a day before meals for 10 days (Patient not taking: Reported on 12/19/2024)    famotidine (PEPCID) 20 mg tablet Take 20 mg by mouth 2 (two) times a day (Patient not taking: Reported on 9/11/2024)    methotrexate 2.5 MG tablet Take by mouth 5 tablets on Sundays (Patient not taking: Reported on 5/17/2024)    vitamin B-12 (VITAMIN B-12) 1,000 mcg tablet Take 1 tablet (1,000 mcg total) by mouth daily (Patient not  "taking: Reported on 12/19/2024)     No current facility-administered medications on file prior to visit.     He has no known allergies..    Review of Systems   Constitutional:  Negative for chills and fever.   HENT:  Positive for congestion. Negative for ear pain and sore throat.    Eyes:  Negative for pain.   Respiratory:  Negative for cough and shortness of breath.    Cardiovascular:  Negative for chest pain and leg swelling.   Gastrointestinal:  Negative for abdominal pain, nausea and vomiting.   Endocrine: Negative for polyuria.   Genitourinary:  Negative for difficulty urinating, frequency and urgency.   Musculoskeletal:  Positive for arthralgias. Negative for back pain.   Skin:  Negative for rash.   Neurological:  Negative for weakness and headaches.   Psychiatric/Behavioral:  Negative for sleep disturbance. The patient is not nervous/anxious.          Objective:      /80 (BP Location: Left arm, Patient Position: Sitting, Cuff Size: Standard)   Pulse 92   Temp 97.7 °F (36.5 °C) (Temporal)   Ht 5' 7\" (1.702 m)   Wt 74.8 kg (165 lb)   SpO2 94%   BMI 25.84 kg/m²     Recent Results (from the past 8 weeks)   Albumin / creatinine urine ratio    Collection Time: 12/11/24  9:34 AM   Result Value Ref Range    Creatinine, Ur 72.1 Reference range not established. mg/dL    Albumin,U,Random 8.3 <20.0 mg/L    Albumin Creat Ratio 12 0 - 30 mg/g creatinine   CBC and differential    Collection Time: 12/11/24  9:34 AM   Result Value Ref Range    WBC 4.71 4.31 - 10.16 Thousand/uL    RBC 5.34 3.88 - 5.62 Million/uL    Hemoglobin 15.4 12.0 - 17.0 g/dL    Hematocrit 46.2 36.5 - 49.3 %    MCV 87 82 - 98 fL    MCH 28.8 26.8 - 34.3 pg    MCHC 33.3 31.4 - 37.4 g/dL    RDW 12.8 11.6 - 15.1 %    MPV 10.0 8.9 - 12.7 fL    Platelets 221 149 - 390 Thousands/uL    nRBC 0 /100 WBCs    Segmented % 56 43 - 75 %    Immature Grans % 0 0 - 2 %    Lymphocytes % 33 14 - 44 %    Monocytes % 8 4 - 12 %    Eosinophils Relative 2 0 - 6 %    " Basophils Relative 1 0 - 1 %    Absolute Neutrophils 2.60 1.85 - 7.62 Thousands/µL    Absolute Immature Grans 0.01 0.00 - 0.20 Thousand/uL    Absolute Lymphocytes 1.57 0.60 - 4.47 Thousands/µL    Absolute Monocytes 0.38 0.17 - 1.22 Thousand/µL    Eosinophils Absolute 0.11 0.00 - 0.61 Thousand/µL    Basophils Absolute 0.04 0.00 - 0.10 Thousands/µL   Comprehensive metabolic panel    Collection Time: 12/11/24  9:34 AM   Result Value Ref Range    Sodium 140 135 - 147 mmol/L    Potassium 4.0 3.5 - 5.3 mmol/L    Chloride 103 96 - 108 mmol/L    CO2 32 21 - 32 mmol/L    ANION GAP 5 4 - 13 mmol/L    BUN 17 5 - 25 mg/dL    Creatinine 0.99 0.60 - 1.30 mg/dL    Glucose, Fasting 100 (H) 65 - 99 mg/dL    Calcium 9.4 8.4 - 10.2 mg/dL    AST 19 13 - 39 U/L    ALT 16 7 - 52 U/L    Alkaline Phosphatase 89 34 - 104 U/L    Total Protein 6.5 6.4 - 8.4 g/dL    Albumin 4.3 3.5 - 5.0 g/dL    Total Bilirubin 0.88 0.20 - 1.00 mg/dL    eGFR 77 ml/min/1.73sq m   Lipid Panel with Direct LDL reflex    Collection Time: 12/11/24  9:34 AM   Result Value Ref Range    Cholesterol 176 See Comment mg/dL    Triglycerides 86 See Comment mg/dL    HDL, Direct 47 >=40 mg/dL    LDL Calculated 112 (H) 0 - 100 mg/dL   TSH, 3rd generation    Collection Time: 12/11/24  9:34 AM   Result Value Ref Range    TSH 3RD GENERATON 1.357 0.450 - 4.500 uIU/mL   Hemoglobin A1C    Collection Time: 12/11/24  9:34 AM   Result Value Ref Range    Hemoglobin A1C 6.7 (H) Normal 4.0-5.6%; PreDiabetic 5.7-6.4%; Diabetic >=6.5%; Glycemic control for adults with diabetes <7.0% %     mg/dl        Physical Exam  Constitutional:       Appearance: Normal appearance.   HENT:      Head: Normocephalic.      Right Ear: Tympanic membrane, ear canal and external ear normal.      Left Ear: Tympanic membrane, ear canal and external ear normal.      Nose: Nose normal. No congestion.      Mouth/Throat:      Mouth: Mucous membranes are moist.      Pharynx: Oropharynx is clear. No  oropharyngeal exudate or posterior oropharyngeal erythema.   Eyes:      Extraocular Movements: Extraocular movements intact.      Conjunctiva/sclera: Conjunctivae normal.   Cardiovascular:      Rate and Rhythm: Normal rate and regular rhythm.      Heart sounds: Normal heart sounds. No murmur heard.  Pulmonary:      Effort: Pulmonary effort is normal.      Breath sounds: Normal breath sounds. No wheezing or rales.   Abdominal:      General: Abdomen is flat. There is no distension.      Palpations: Abdomen is soft.      Tenderness: There is no abdominal tenderness.   Musculoskeletal:      Cervical back: Normal range of motion and neck supple.      Right lower leg: No edema.      Left lower leg: No edema.      Comments: Left foot exam mild tenderness present laterally and posteriorly at the ankle joint   Lymphadenopathy:      Cervical: No cervical adenopathy.   Skin:     General: Skin is warm.   Neurological:      General: No focal deficit present.      Mental Status: He is alert and oriented to person, place, and time.

## 2025-01-16 ENCOUNTER — TELEPHONE (OUTPATIENT)
Age: 70
End: 2025-01-16

## 2025-01-16 ENCOUNTER — APPOINTMENT (OUTPATIENT)
Dept: LAB | Facility: CLINIC | Age: 70
End: 2025-01-16
Payer: MEDICARE

## 2025-01-16 LAB — PSA SERPL-MCNC: 2.8 NG/ML (ref 0–4)

## 2025-01-27 LAB
LEFT EYE DIABETIC RETINOPATHY: NORMAL
RIGHT EYE DIABETIC RETINOPATHY: NORMAL

## 2025-01-30 ENCOUNTER — TELEPHONE (OUTPATIENT)
Dept: ADMINISTRATIVE | Facility: OTHER | Age: 70
End: 2025-01-30

## 2025-01-30 NOTE — TELEPHONE ENCOUNTER
----- Message from Robyn ALDRICH sent at 1/29/2025  9:07 AM EST -----  Regarding: Update eye exam due date  ...01/29/25 9:09 AM    Hello, our patient Juan Lucero has had Diabetic Eye Exam completed/performed. Please assist in updating the patient chart by pulling the document from the Media Tab. The date of service is 0127/2025.    Thank you,  Robyn Cevallos  [facility]

## 2025-02-03 NOTE — TELEPHONE ENCOUNTER
Upon review of the In Basket request we have found/obtained the documentation. After careful review of the document we are unable to complete this request for Diabetic Eye Exam because the documentation does not have the result(s) needed to close the requested care gap(s).    Any additional questions or concerns should be emailed to the Practice Liaisons via the appropriate education email address, please do not reply via In Basket.    Thank you  Janice Uribe MA   [facility]

## 2025-03-14 ENCOUNTER — RA CDI HCC (OUTPATIENT)
Dept: OTHER | Facility: HOSPITAL | Age: 70
End: 2025-03-14

## 2025-03-21 ENCOUNTER — OFFICE VISIT (OUTPATIENT)
Dept: INTERNAL MEDICINE CLINIC | Facility: CLINIC | Age: 70
End: 2025-03-21
Payer: MEDICARE

## 2025-03-21 VITALS
BODY MASS INDEX: 26.3 KG/M2 | HEART RATE: 95 BPM | TEMPERATURE: 98.5 F | SYSTOLIC BLOOD PRESSURE: 118 MMHG | HEIGHT: 67 IN | OXYGEN SATURATION: 96 % | DIASTOLIC BLOOD PRESSURE: 76 MMHG | WEIGHT: 167.6 LBS

## 2025-03-21 DIAGNOSIS — E78.2 MIXED HYPERLIPIDEMIA: ICD-10-CM

## 2025-03-21 DIAGNOSIS — E11.9 TYPE 2 DIABETES, HBA1C GOAL < 7% (HCC): Primary | ICD-10-CM

## 2025-03-21 DIAGNOSIS — F33.9 DEPRESSION, RECURRENT (HCC): ICD-10-CM

## 2025-03-21 DIAGNOSIS — G47.33 OSA ON CPAP: ICD-10-CM

## 2025-03-21 DIAGNOSIS — K21.9 GASTROESOPHAGEAL REFLUX DISEASE WITHOUT ESOPHAGITIS: ICD-10-CM

## 2025-03-21 PROCEDURE — G2211 COMPLEX E/M VISIT ADD ON: HCPCS | Performed by: INTERNAL MEDICINE

## 2025-03-21 PROCEDURE — 99214 OFFICE O/P EST MOD 30 MIN: CPT | Performed by: INTERNAL MEDICINE

## 2025-03-21 RX ORDER — NORTRIPTYLINE HYDROCHLORIDE 10 MG/1
10 CAPSULE ORAL DAILY
COMMUNITY
Start: 2025-02-17

## 2025-03-21 RX ORDER — PANTOPRAZOLE SODIUM 40 MG/1
40 TABLET, DELAYED RELEASE ORAL
Qty: 30 TABLET | Refills: 0 | Status: SHIPPED | OUTPATIENT
Start: 2025-03-21 | End: 2025-09-17

## 2025-03-21 NOTE — PROGRESS NOTES
Assessment/Plan:             1. Type 2 diabetes, HbA1c goal < 7% ([facility])  Comments:  diet and exercise discussed  Orders:  -     CBC and differential; Future  -     Albumin / creatinine urine ratio; Future  -     Comprehensive metabolic panel; Future  -     Lipid Panel with Direct LDL reflex; Future  -     TSH, 3rd generation; Future  -     Hemoglobin A1C; Future  2. GONZALEZ on CPAP  Comments:  continue cpap  3. Mixed hyperlipidemia  Comments:  continue same med  Orders:  -     Comprehensive metabolic panel; Future  -     Lipid Panel with Direct LDL reflex; Future  -     TSH, 3rd generation; Future  4. Depression, recurrent (HCC)  Comments:  continue same med  5. Gastroesophageal reflux disease without esophagitis  Comments:  continue same med  Orders:  -     pantoprazole (PROTONIX) 40 mg tablet; Take 1 tablet (40 mg total) by mouth daily before breakfast         Subjective:      Patient ID: Juan Lucero is a 69 y.o. male.    Follow-up on multiple medical problems ensure they are stable on current medications        The following portions of the patient's history were reviewed and updated as appropriate: He  has a past medical history of Anxiety, Depression, GERD (gastroesophageal reflux disease), Hyperlipidemia, Kidney stone, Polymyalgia rheumatica syndrome (HCC), and Psychiatric disorder.  He   Patient Active Problem List    Diagnosis Date Noted   • Allergic rhinitis due to allergen 01/06/2025   • GONZALEZ on CPAP 08/27/2024   • GONZALEZ (obstructive sleep apnea) 06/11/2024   • Type 2 diabetes, HbA1c goal < 7% ([facility]) 05/17/2024   • Excessive daytime sleepiness 05/08/2024   • Sleep disturbance 05/08/2024   • Periodic limb movement 05/08/2024   • Temporal arteritis ([facility]) 02/02/2024   • Left inguinal hernia 11/13/2023   • Umbilical hernia without obstruction and without gangrene 11/03/2023   • Gastroesophageal reflux disease without esophagitis 11/03/2023   • Depression, recurrent (HCC) 05/23/2023   • Preop exam for internal  medicine 07/20/2022   • Polymyalgia rheumatica syndrome (HCC) 05/18/2022   • Impaired fasting blood sugar 05/18/2022   • Other chest pain 11/17/2020   • Pain in left knee 05/23/2018   • Left facial numbness 12/18/2016   • History of temporal arteritis 12/18/2016   • HTN (hypertension) 12/18/2016   • Mixed hyperlipidemia 12/18/2016   • Acute nonintractable headache 12/18/2016   • Obsessive compulsive disorder 12/18/2016   • Anxiety 12/18/2016   • ADH disorder 12/18/2016     He  has a past surgical history that includes Appendectomy; Tonsillectomy; Kidney stone surgery; Transurethral resection of prostate; [place] tooth extraction; Colonoscopy; pr rpr 1st ingun hrna age 4 yrs/> reducible (Left, 2/16/2024); and pr rpr aa hernia 1st < 3 cm reducible (N/A, 2/16/2024).  His family history includes Cancer in his father and mother; Diabetes in his mother; Heart attack in his father; Heart disease in his father; Prostate cancer in his father.  He  reports that he has never smoked. He has never used smokeless tobacco. He reports that he does not drink alcohol and does not use drugs.  Current Outpatient Medications   Medication Sig Dispense Refill   • clonazePAM (KlonoPIN) 0.5 mg tablet Take 0.5 mg by mouth daily at bedtime .5 mg once at night, and half in morning     • nortriptyline (PAMELOR) 10 mg capsule Take 10 mg by mouth daily     • pantoprazole (PROTONIX) 40 mg tablet Take 1 tablet (40 mg total) by mouth daily before breakfast 30 tablet 0   • QUEtiapine (SEROquel) 50 mg tablet Take 50 mg by mouth daily at bedtime     • simvastatin (ZOCOR) 10 mg tablet Take 1 tablet (10 mg total) by mouth daily at bedtime 90 tablet 1   • vilazodone (VIIBRYD) 40 mg tablet Take 40 mg by mouth daily with breakfast     • cholestyramine (QUESTRAN) 4 g packet Take 1 packet (4 g total) by mouth 3 (three) times a day with meals (Patient not taking: Reported on 12/19/2024) 90 packet 0   • clobetasol (TEMOVATE) 0.05 % ointment Apply topically 2  (two) times a day (Patient not taking: Reported on 9/11/2024) 120 g 1   • cycloSPORINE (RESTASIS) 0.05 % ophthalmic emulsion  (Patient not taking: Reported on 10/8/2024)     • dicyclomine (BENTYL) 10 mg capsule Take 1 capsule (10 mg total) by mouth 3 (three) times a day before meals for 10 days (Patient not taking: Reported on 12/19/2024) 30 capsule 0   • famotidine (PEPCID) 20 mg tablet Take 20 mg by mouth 2 (two) times a day (Patient not taking: Reported on 9/11/2024)     • fluticasone (FLONASE) 50 mcg/act nasal spray 1 spray into each nostril daily (Patient not taking: Reported on 3/21/2025) 16 g 0   • methotrexate 2.5 MG tablet Take by mouth 5 tablets on Sundays (Patient not taking: Reported on 3/21/2025)     • vitamin B-12 (VITAMIN B-12) 1,000 mcg tablet Take 1 tablet (1,000 mcg total) by mouth daily (Patient not taking: Reported on 12/19/2024) 90 tablet 1     No current facility-administered medications for this visit.     Current Outpatient Medications on File Prior to Visit   Medication Sig   • clonazePAM (KlonoPIN) 0.5 mg tablet Take 0.5 mg by mouth daily at bedtime .5 mg once at night, and half in morning   • nortriptyline (PAMELOR) 10 mg capsule Take 10 mg by mouth daily   • QUEtiapine (SEROquel) 50 mg tablet Take 50 mg by mouth daily at bedtime   • simvastatin (ZOCOR) 10 mg tablet Take 1 tablet (10 mg total) by mouth daily at bedtime   • vilazodone (VIIBRYD) 40 mg tablet Take 40 mg by mouth daily with breakfast   • cholestyramine (QUESTRAN) 4 g packet Take 1 packet (4 g total) by mouth 3 (three) times a day with meals (Patient not taking: Reported on 12/19/2024)   • clobetasol (TEMOVATE) 0.05 % ointment Apply topically 2 (two) times a day (Patient not taking: Reported on 9/11/2024)   • cycloSPORINE (RESTASIS) 0.05 % ophthalmic emulsion  (Patient not taking: Reported on 10/8/2024)   • dicyclomine (BENTYL) 10 mg capsule Take 1 capsule (10 mg total) by mouth 3 (three) times a day before meals for 10 days  "(Patient not taking: Reported on 12/19/2024)   • famotidine (PEPCID) 20 mg tablet Take 20 mg by mouth 2 (two) times a day (Patient not taking: Reported on 9/11/2024)   • fluticasone (FLONASE) 50 mcg/act nasal spray 1 spray into each nostril daily (Patient not taking: Reported on 3/21/2025)   • methotrexate 2.5 MG tablet Take by mouth 5 tablets on Sundays (Patient not taking: Reported on 3/21/2025)   • vitamin B-12 (VITAMIN B-12) 1,000 mcg tablet Take 1 tablet (1,000 mcg total) by mouth daily (Patient not taking: Reported on 12/19/2024)   • [DISCONTINUED] pantoprazole (PROTONIX) 40 mg tablet Take 1 tablet (40 mg total) by mouth daily before breakfast (Patient not taking: Reported on 3/21/2025)     No current facility-administered medications on file prior to visit.     He has no known allergies..    Review of Systems   Constitutional:  Negative for chills and fever.   HENT:  Negative for congestion, ear pain and sore throat.    Eyes:  Negative for pain.   Respiratory:  Negative for cough and shortness of breath.    Cardiovascular:  Negative for chest pain and leg swelling.   Gastrointestinal:  Positive for abdominal pain. Negative for nausea and vomiting.   Endocrine: Negative for polyuria.   Genitourinary:  Negative for difficulty urinating, frequency and urgency.   Musculoskeletal:  Negative for arthralgias and back pain.   Skin:  Negative for rash.   Neurological:  Negative for weakness and headaches.   Psychiatric/Behavioral:  Negative for sleep disturbance. The patient is not nervous/anxious.          Objective:      /76 (BP Location: Left arm, Patient Position: Sitting, Cuff Size: Standard)   Pulse 95   Temp 98.5 °F (36.9 °C) (Temporal)   Ht 5' 7\" (1.702 m)   Wt 76 kg (167 lb 9.6 oz)   SpO2 96%   BMI 26.25 kg/m²     No results found for this or any previous visit (from the past 8 weeks).     Physical Exam  Constitutional:       Appearance: Normal appearance.   HENT:      Head: Normocephalic.      " Right Ear: External ear normal.      Left Ear: External ear normal.      Nose: Nose normal. No congestion.      Mouth/Throat:      Mouth: Mucous membranes are moist.      Pharynx: Oropharynx is clear. No oropharyngeal exudate or posterior oropharyngeal erythema.   Eyes:      Extraocular Movements: Extraocular movements intact.      Conjunctiva/sclera: Conjunctivae normal.      Pupils: Pupils are equal, round, and reactive to light.   Cardiovascular:      Rate and Rhythm: Normal rate and regular rhythm.      Heart sounds: Normal heart sounds. No murmur heard.  Pulmonary:      Effort: Pulmonary effort is normal.      Breath sounds: Normal breath sounds. No wheezing or rales.   Abdominal:      General: Bowel sounds are normal. There is no distension.      Palpations: Abdomen is soft.      Tenderness: There is no abdominal tenderness.   Musculoskeletal:         General: Normal range of motion.      Cervical back: Normal range of motion and neck supple.      Right lower leg: No edema.      Left lower leg: No edema.   Lymphadenopathy:      Cervical: No cervical adenopathy.   Skin:     General: Skin is warm.   Neurological:      General: No focal deficit present.      Mental Status: He is alert and oriented to person, place, and time.

## 2025-03-26 ENCOUNTER — OFFICE VISIT (OUTPATIENT)
Dept: INTERNAL MEDICINE CLINIC | Facility: CLINIC | Age: 70
End: 2025-03-26
Payer: MEDICARE

## 2025-03-26 VITALS
OXYGEN SATURATION: 97 % | DIASTOLIC BLOOD PRESSURE: 76 MMHG | HEIGHT: 67 IN | BODY MASS INDEX: 25.71 KG/M2 | HEART RATE: 94 BPM | SYSTOLIC BLOOD PRESSURE: 128 MMHG | TEMPERATURE: 98.2 F | WEIGHT: 163.8 LBS

## 2025-03-26 DIAGNOSIS — J01.00 ACUTE NON-RECURRENT MAXILLARY SINUSITIS: Primary | ICD-10-CM

## 2025-03-26 PROCEDURE — G2211 COMPLEX E/M VISIT ADD ON: HCPCS | Performed by: INTERNAL MEDICINE

## 2025-03-26 PROCEDURE — 99213 OFFICE O/P EST LOW 20 MIN: CPT | Performed by: INTERNAL MEDICINE

## 2025-03-26 NOTE — PROGRESS NOTES
Assessment/Plan:             1. Acute non-recurrent maxillary sinusitis  -     amoxicillin-clavulanate (AUGMENTIN) 875-125 mg per tablet; Take 1 tablet by mouth every 12 (twelve) hours for 7 days         Subjective:      Patient ID: Juan Lucero is a 69 y.o. male.    Cough, sore throat, dry cough,        The following portions of the patient's history were reviewed and updated as appropriate: He  has a past medical history of Anxiety, Depression, GERD (gastroesophageal reflux disease), Hyperlipidemia, Kidney stone, Polymyalgia rheumatica syndrome (HCC), and Psychiatric disorder.  He   Patient Active Problem List    Diagnosis Date Noted   • Allergic rhinitis due to allergen 01/06/2025   • GONZALEZ on CPAP 08/27/2024   • GONZALEZ (obstructive sleep apnea) 06/11/2024   • Type 2 diabetes, HbA1c goal < 7% ([facility]) 05/17/2024   • Excessive daytime sleepiness 05/08/2024   • Sleep disturbance 05/08/2024   • Periodic limb movement 05/08/2024   • Temporal arteritis ([facility]) 02/02/2024   • Left inguinal hernia 11/13/2023   • Umbilical hernia without obstruction and without gangrene 11/03/2023   • Gastroesophageal reflux disease without esophagitis 11/03/2023   • Depression, recurrent ([facility]) 05/23/2023   • Preop exam for internal medicine 07/20/2022   • Polymyalgia rheumatica syndrome (HCC) 05/18/2022   • Impaired fasting blood sugar 05/18/2022   • Other chest pain 11/17/2020   • Pain in left knee 05/23/2018   • Left facial numbness 12/18/2016   • History of temporal arteritis 12/18/2016   • HTN (hypertension) 12/18/2016   • Mixed hyperlipidemia 12/18/2016   • Acute nonintractable headache 12/18/2016   • Obsessive compulsive disorder 12/18/2016   • Anxiety 12/18/2016   • ADH disorder 12/18/2016     He  has a past surgical history that includes Appendectomy; Tonsillectomy; Kidney stone surgery; Transurethral resection of prostate; Kimberly tooth extraction; Colonoscopy; pr rpr 1st ingun hrna age 4 yrs/> reducible (Left, 2/16/2024); and pr  rpr aa hernia 1st < 3 cm reducible (N/A, 2/16/2024).  His family history includes Cancer in his father and mother; Diabetes in his mother; Heart attack in his father; Heart disease in his father; Prostate cancer in his father.  He  reports that he has never smoked. He has never used smokeless tobacco. He reports that he does not drink alcohol and does not use drugs.  Current Outpatient Medications   Medication Sig Dispense Refill   • amoxicillin-clavulanate (AUGMENTIN) 875-125 mg per tablet Take 1 tablet by mouth every 12 (twelve) hours for 7 days 14 tablet 0   • nortriptyline (PAMELOR) 10 mg capsule Take 10 mg by mouth daily     • pantoprazole (PROTONIX) 40 mg tablet Take 1 tablet (40 mg total) by mouth daily before breakfast 30 tablet 0   • QUEtiapine (SEROquel) 50 mg tablet Take 50 mg by mouth daily at bedtime     • simvastatin (ZOCOR) 10 mg tablet Take 1 tablet (10 mg total) by mouth daily at bedtime 90 tablet 1   • vilazodone (VIIBRYD) 40 mg tablet Take 40 mg by mouth daily with breakfast     • cholestyramine (QUESTRAN) 4 g packet Take 1 packet (4 g total) by mouth 3 (three) times a day with meals (Patient not taking: Reported on 3/26/2025) 90 packet 0   • clobetasol (TEMOVATE) 0.05 % ointment Apply topically 2 (two) times a day (Patient not taking: Reported on 9/11/2024) 120 g 1   • clonazePAM (KlonoPIN) 0.5 mg tablet Take 0.5 mg by mouth daily at bedtime .5 mg once at night, and half in morning (Patient not taking: Reported on 3/26/2025)     • cycloSPORINE (RESTASIS) 0.05 % ophthalmic emulsion  (Patient not taking: Reported on 10/8/2024)     • dicyclomine (BENTYL) 10 mg capsule Take 1 capsule (10 mg total) by mouth 3 (three) times a day before meals for 10 days (Patient not taking: Reported on 9/11/2024) 30 capsule 0   • famotidine (PEPCID) 20 mg tablet Take 20 mg by mouth 2 (two) times a day (Patient not taking: Reported on 9/11/2024)     • fluticasone (FLONASE) 50 mcg/act nasal spray 1 spray into each  nostril daily (Patient not taking: Reported on 3/26/2025) 16 g 0   • methotrexate 2.5 MG tablet Take by mouth 5 tablets on Sundays (Patient not taking: Reported on 3/21/2025)     • vitamin B-12 (VITAMIN B-12) 1,000 mcg tablet Take 1 tablet (1,000 mcg total) by mouth daily (Patient not taking: Reported on 12/19/2024) 90 tablet 1     No current facility-administered medications for this visit.     Current Outpatient Medications on File Prior to Visit   Medication Sig   • nortriptyline (PAMELOR) 10 mg capsule Take 10 mg by mouth daily   • pantoprazole (PROTONIX) 40 mg tablet Take 1 tablet (40 mg total) by mouth daily before breakfast   • QUEtiapine (SEROquel) 50 mg tablet Take 50 mg by mouth daily at bedtime   • simvastatin (ZOCOR) 10 mg tablet Take 1 tablet (10 mg total) by mouth daily at bedtime   • vilazodone (VIIBRYD) 40 mg tablet Take 40 mg by mouth daily with breakfast   • cholestyramine (QUESTRAN) 4 g packet Take 1 packet (4 g total) by mouth 3 (three) times a day with meals (Patient not taking: Reported on 3/26/2025)   • clobetasol (TEMOVATE) 0.05 % ointment Apply topically 2 (two) times a day (Patient not taking: Reported on 9/11/2024)   • clonazePAM (KlonoPIN) 0.5 mg tablet Take 0.5 mg by mouth daily at bedtime .5 mg once at night, and half in morning (Patient not taking: Reported on 3/26/2025)   • cycloSPORINE (RESTASIS) 0.05 % ophthalmic emulsion  (Patient not taking: Reported on 10/8/2024)   • dicyclomine (BENTYL) 10 mg capsule Take 1 capsule (10 mg total) by mouth 3 (three) times a day before meals for 10 days (Patient not taking: Reported on 9/11/2024)   • famotidine (PEPCID) 20 mg tablet Take 20 mg by mouth 2 (two) times a day (Patient not taking: Reported on 9/11/2024)   • fluticasone (FLONASE) 50 mcg/act nasal spray 1 spray into each nostril daily (Patient not taking: Reported on 3/26/2025)   • methotrexate 2.5 MG tablet Take by mouth 5 tablets on Sundays (Patient not taking: Reported on 3/21/2025)  "  • vitamin B-12 (VITAMIN B-12) 1,000 mcg tablet Take 1 tablet (1,000 mcg total) by mouth daily (Patient not taking: Reported on 12/19/2024)     No current facility-administered medications on file prior to visit.     He has no known allergies..    Review of Systems   Constitutional:  Negative for chills and fever.   HENT:  Positive for sore throat. Negative for congestion and ear pain.    Eyes:  Negative for pain.   Respiratory:  Positive for cough. Negative for shortness of breath.    Cardiovascular:  Negative for chest pain and leg swelling.   Gastrointestinal:  Negative for abdominal pain, nausea and vomiting.   Endocrine: Negative for polyuria.   Genitourinary:  Negative for difficulty urinating, frequency and urgency.   Musculoskeletal:  Negative for arthralgias and back pain.   Skin:  Negative for rash.   Neurological:  Negative for weakness and headaches.   Psychiatric/Behavioral:  Negative for sleep disturbance. The patient is not nervous/anxious.          Objective:      /76 (BP Location: Left arm, Patient Position: Sitting, Cuff Size: Standard)   Pulse 94   Temp 98.2 °F (36.8 °C) (Temporal)   Ht 5' 7\" (1.702 m)   Wt 74.3 kg (163 lb 12.8 oz)   SpO2 97%   BMI 25.65 kg/m²     No results found for this or any previous visit (from the past 8 weeks).     Physical Exam  Constitutional:       Appearance: Normal appearance.   HENT:      Head: Normocephalic.      Right Ear: Tympanic membrane, ear canal and external ear normal.      Left Ear: Tympanic membrane, ear canal and external ear normal.      Nose: Nose normal. No congestion.      Mouth/Throat:      Mouth: Mucous membranes are moist.      Pharynx: Oropharynx is clear. No oropharyngeal exudate or posterior oropharyngeal erythema.   Eyes:      Extraocular Movements: Extraocular movements intact.      Conjunctiva/sclera: Conjunctivae normal.   Cardiovascular:      Rate and Rhythm: Normal rate and regular rhythm.      Heart sounds: Normal heart " sounds. No murmur heard.  Pulmonary:      Effort: Pulmonary effort is normal.      Breath sounds: Normal breath sounds. No wheezing or rales.   Abdominal:      General: Abdomen is flat. There is no distension.      Palpations: Abdomen is soft.      Tenderness: There is no abdominal tenderness.   Musculoskeletal:      Cervical back: Normal range of motion and neck supple.      Right lower leg: No edema.      Left lower leg: No edema.   Lymphadenopathy:      Cervical: No cervical adenopathy.   Skin:     General: Skin is warm.   Neurological:      General: No focal deficit present.      Mental Status: He is alert and oriented to person, place, and time.

## 2025-05-14 ENCOUNTER — TELEPHONE (OUTPATIENT)
Age: 70
End: 2025-05-14

## 2025-05-14 NOTE — TELEPHONE ENCOUNTER
Pt called asking when he is scheduled for and if psa needed, I informed him of 09/08/25 2:45 appointment and he had psa done 01/16/25

## 2025-05-23 ENCOUNTER — APPOINTMENT (OUTPATIENT)
Dept: LAB | Facility: CLINIC | Age: 70
End: 2025-05-23
Attending: INTERNAL MEDICINE
Payer: MEDICARE

## 2025-05-23 DIAGNOSIS — E78.2 MIXED HYPERLIPIDEMIA: ICD-10-CM

## 2025-05-23 DIAGNOSIS — E11.9 TYPE 2 DIABETES, HBA1C GOAL < 7% (HCC): ICD-10-CM

## 2025-05-23 LAB
ALBUMIN SERPL BCG-MCNC: 4.3 G/DL (ref 3.5–5)
ALP SERPL-CCNC: 82 U/L (ref 34–104)
ALT SERPL W P-5'-P-CCNC: 18 U/L (ref 7–52)
ANION GAP SERPL CALCULATED.3IONS-SCNC: 5 MMOL/L (ref 4–13)
AST SERPL W P-5'-P-CCNC: 19 U/L (ref 13–39)
BASOPHILS # BLD AUTO: 0.03 THOUSANDS/ÂΜL (ref 0–0.1)
BASOPHILS NFR BLD AUTO: 1 % (ref 0–1)
BILIRUB SERPL-MCNC: 0.86 MG/DL (ref 0.2–1)
BUN SERPL-MCNC: 16 MG/DL (ref 5–25)
CALCIUM SERPL-MCNC: 9.4 MG/DL (ref 8.4–10.2)
CHLORIDE SERPL-SCNC: 104 MMOL/L (ref 96–108)
CHOLEST SERPL-MCNC: 170 MG/DL (ref ?–200)
CO2 SERPL-SCNC: 31 MMOL/L (ref 21–32)
CREAT SERPL-MCNC: 1.05 MG/DL (ref 0.6–1.3)
CREAT UR-MCNC: 169.8 MG/DL
EOSINOPHIL # BLD AUTO: 0.17 THOUSAND/ÂΜL (ref 0–0.61)
EOSINOPHIL NFR BLD AUTO: 3 % (ref 0–6)
ERYTHROCYTE [DISTWIDTH] IN BLOOD BY AUTOMATED COUNT: 12.6 % (ref 11.6–15.1)
EST. AVERAGE GLUCOSE BLD GHB EST-MCNC: 137 MG/DL
GFR SERPL CREATININE-BSD FRML MDRD: 71 ML/MIN/1.73SQ M
GLUCOSE P FAST SERPL-MCNC: 108 MG/DL (ref 65–99)
HBA1C MFR BLD: 6.4 %
HCT VFR BLD AUTO: 44.2 % (ref 36.5–49.3)
HDLC SERPL-MCNC: 50 MG/DL
HGB BLD-MCNC: 15.1 G/DL (ref 12–17)
IMM GRANULOCYTES # BLD AUTO: 0.01 THOUSAND/UL (ref 0–0.2)
IMM GRANULOCYTES NFR BLD AUTO: 0 % (ref 0–2)
LDLC SERPL CALC-MCNC: 99 MG/DL (ref 0–100)
LYMPHOCYTES # BLD AUTO: 1.62 THOUSANDS/ÂΜL (ref 0.6–4.47)
LYMPHOCYTES NFR BLD AUTO: 32 % (ref 14–44)
MCH RBC QN AUTO: 29.3 PG (ref 26.8–34.3)
MCHC RBC AUTO-ENTMCNC: 34.2 G/DL (ref 31.4–37.4)
MCV RBC AUTO: 86 FL (ref 82–98)
MICROALBUMIN UR-MCNC: 18.4 MG/L
MICROALBUMIN/CREAT 24H UR: 11 MG/G CREATININE (ref 0–30)
MONOCYTES # BLD AUTO: 0.54 THOUSAND/ÂΜL (ref 0.17–1.22)
MONOCYTES NFR BLD AUTO: 11 % (ref 4–12)
NEUTROPHILS # BLD AUTO: 2.7 THOUSANDS/ÂΜL (ref 1.85–7.62)
NEUTS SEG NFR BLD AUTO: 53 % (ref 43–75)
NRBC BLD AUTO-RTO: 0 /100 WBCS
PLATELET # BLD AUTO: 214 THOUSANDS/UL (ref 149–390)
PMV BLD AUTO: 9.9 FL (ref 8.9–12.7)
POTASSIUM SERPL-SCNC: 3.6 MMOL/L (ref 3.5–5.3)
PROT SERPL-MCNC: 6.5 G/DL (ref 6.4–8.4)
RBC # BLD AUTO: 5.15 MILLION/UL (ref 3.88–5.62)
SODIUM SERPL-SCNC: 140 MMOL/L (ref 135–147)
TRIGL SERPL-MCNC: 106 MG/DL (ref ?–150)
TSH SERPL DL<=0.05 MIU/L-ACNC: 2.21 UIU/ML (ref 0.45–4.5)
WBC # BLD AUTO: 5.07 THOUSAND/UL (ref 4.31–10.16)

## 2025-05-23 PROCEDURE — 80061 LIPID PANEL: CPT

## 2025-05-23 PROCEDURE — 36415 COLL VENOUS BLD VENIPUNCTURE: CPT

## 2025-05-23 PROCEDURE — 82043 UR ALBUMIN QUANTITATIVE: CPT

## 2025-05-23 PROCEDURE — 83036 HEMOGLOBIN GLYCOSYLATED A1C: CPT

## 2025-05-23 PROCEDURE — 84443 ASSAY THYROID STIM HORMONE: CPT

## 2025-05-23 PROCEDURE — 82570 ASSAY OF URINE CREATININE: CPT

## 2025-05-23 PROCEDURE — 85025 COMPLETE CBC W/AUTO DIFF WBC: CPT

## 2025-05-23 PROCEDURE — 80053 COMPREHEN METABOLIC PANEL: CPT

## 2025-05-30 ENCOUNTER — OFFICE VISIT (OUTPATIENT)
Dept: INTERNAL MEDICINE CLINIC | Facility: CLINIC | Age: 70
End: 2025-05-30
Payer: MEDICARE

## 2025-05-30 VITALS
OXYGEN SATURATION: 97 % | SYSTOLIC BLOOD PRESSURE: 126 MMHG | BODY MASS INDEX: 25.62 KG/M2 | WEIGHT: 163.2 LBS | HEIGHT: 67 IN | TEMPERATURE: 98.8 F | DIASTOLIC BLOOD PRESSURE: 78 MMHG | HEART RATE: 93 BPM

## 2025-05-30 DIAGNOSIS — F42.8 OTHER OBSESSIVE-COMPULSIVE DISORDERS: ICD-10-CM

## 2025-05-30 DIAGNOSIS — F33.9 DEPRESSION, RECURRENT (HCC): ICD-10-CM

## 2025-05-30 DIAGNOSIS — E11.9 TYPE 2 DIABETES, HBA1C GOAL < 7% (HCC): Primary | ICD-10-CM

## 2025-05-30 DIAGNOSIS — E78.2 MIXED HYPERLIPIDEMIA: ICD-10-CM

## 2025-05-30 PROCEDURE — G2211 COMPLEX E/M VISIT ADD ON: HCPCS | Performed by: INTERNAL MEDICINE

## 2025-05-30 PROCEDURE — 99214 OFFICE O/P EST MOD 30 MIN: CPT | Performed by: INTERNAL MEDICINE

## 2025-05-30 RX ORDER — SIMVASTATIN 10 MG
10 TABLET ORAL
Qty: 90 TABLET | Refills: 1 | Status: SHIPPED | OUTPATIENT
Start: 2025-05-30

## 2025-05-30 NOTE — ASSESSMENT & PLAN NOTE
Lab Results   Component Value Date    HGBA1C 6.4 (H) 05/23/2025   Stable, diet and exercise controlled,   Orders:  •  Ambulatory Referral to Ophthalmology; Future

## 2025-05-30 NOTE — ASSESSMENT & PLAN NOTE
Continue same med  Orders:  •  simvastatin (ZOCOR) 10 mg tablet; Take 1 tablet (10 mg total) by mouth daily at bedtime

## 2025-05-30 NOTE — PROGRESS NOTES
"Name: Juan Lucero      : 1955      MRN: [id number]  Encounter Provider: Ashli Smith MD  Encounter Date: 2025   Encounter department: [facility] INTERNAL MEDICINE  :  Assessment & Plan  Type 2 diabetes, HbA1c goal < 7% (HCC)    Lab Results   Component Value Date    HGBA1C 6.4 (H) 2025   Stable, diet and exercise controlled,   Orders:  •  Ambulatory Referral to Ophthalmology; Future    Mixed hyperlipidemia  Continue same med  Orders:  •  simvastatin (ZOCOR) 10 mg tablet; Take 1 tablet (10 mg total) by mouth daily at bedtime    Depression, recurrent (HCC)  Continue same med       Other obsessive-compulsive disorders  Continue same med              History of Present Illness   Follow-up on blood and urine test done on 2025 test discussed with him      Review of Systems   Constitutional:  Negative for chills and fever.   HENT:  Negative for congestion, ear pain and sore throat.    Eyes:  Negative for pain.   Respiratory:  Negative for cough and shortness of breath.    Cardiovascular:  Negative for chest pain and leg swelling.   Gastrointestinal:  Negative for abdominal pain, nausea and vomiting.   Endocrine: Negative for polyuria.   Genitourinary:  Negative for difficulty urinating, frequency and urgency.   Musculoskeletal:  Positive for arthralgias. Negative for back pain.   Skin:  Negative for rash.   Neurological:  Negative for weakness and headaches.   Psychiatric/Behavioral:  Negative for sleep disturbance. The patient is not nervous/anxious.        Objective   /78 (BP Location: Left arm, Patient Position: Sitting, Cuff Size: Standard)   Pulse 93   Temp 98.8 °F (37.1 °C) (Temporal)   Ht 5' 7\" (1.702 m)   Wt 74 kg (163 lb 3.2 oz)   SpO2 97%   BMI 25.56 kg/m²      Physical Exam  Vitals and nursing note reviewed.   Constitutional:       General: He is not in acute distress.     Appearance: He is well-developed.   HENT:      Head: Normocephalic and atraumatic. "      Right Ear: External ear normal.      Left Ear: External ear normal.      Mouth/Throat:      Mouth: Mucous membranes are moist.      Pharynx: Oropharynx is clear.     Eyes:      Extraocular Movements: Extraocular movements intact.      Conjunctiva/sclera: Conjunctivae normal.       Cardiovascular:      Rate and Rhythm: Normal rate and regular rhythm.      Heart sounds: Normal heart sounds. No murmur heard.  Pulmonary:      Effort: Pulmonary effort is normal. No respiratory distress.      Breath sounds: Normal breath sounds. No wheezing or rales.   Abdominal:      General: Abdomen is flat. There is no distension.      Palpations: Abdomen is soft.      Tenderness: There is no abdominal tenderness.     Musculoskeletal:         General: No swelling.      Cervical back: Neck supple.      Right lower leg: No edema.      Left lower leg: No edema.     Skin:     General: Skin is warm and dry.      Capillary Refill: Capillary refill takes less than 2 seconds.     Neurological:      General: No focal deficit present.      Mental Status: He is alert and oriented to person, place, and time.     Psychiatric:         Mood and Affect: Mood normal.

## 2025-06-09 ENCOUNTER — APPOINTMENT (OUTPATIENT)
Dept: RADIOLOGY | Age: 70
End: 2025-06-09
Attending: STUDENT IN AN ORGANIZED HEALTH CARE EDUCATION/TRAINING PROGRAM
Payer: MEDICARE

## 2025-06-09 ENCOUNTER — OFFICE VISIT (OUTPATIENT)
Dept: URGENT CARE | Age: 70
End: 2025-06-09
Payer: MEDICARE

## 2025-06-09 VITALS
TEMPERATURE: 98 F | SYSTOLIC BLOOD PRESSURE: 114 MMHG | HEART RATE: 85 BPM | BODY MASS INDEX: 25.37 KG/M2 | OXYGEN SATURATION: 97 % | WEIGHT: 162 LBS | DIASTOLIC BLOOD PRESSURE: 66 MMHG | RESPIRATION RATE: 16 BRPM

## 2025-06-09 DIAGNOSIS — S99.912A ANKLE INJURY, LEFT, INITIAL ENCOUNTER: ICD-10-CM

## 2025-06-09 DIAGNOSIS — S99.912A ANKLE INJURY, LEFT, INITIAL ENCOUNTER: Primary | ICD-10-CM

## 2025-06-09 DIAGNOSIS — S96.912A LEFT ANKLE STRAIN, INITIAL ENCOUNTER: ICD-10-CM

## 2025-06-09 PROCEDURE — 99213 OFFICE O/P EST LOW 20 MIN: CPT

## 2025-06-09 PROCEDURE — 73610 X-RAY EXAM OF ANKLE: CPT

## 2025-06-09 PROCEDURE — G0463 HOSPITAL OUTPT CLINIC VISIT: HCPCS

## 2025-06-10 NOTE — PATIENT INSTRUCTIONS
Xray left ankle: Negative for acte fracture or dislocation identified by me.  If the radiologist has any positive findings, we will contact you.  You may monitor MyChart for your results.  Take 600 to 800 mg of ibuprofen every 8 hours.  Supplement with Tylenol 1000 mg every 8 hours as needed, alternating every 4 hours with ibuprofen.  Ice 20 minutes on 20 minutes off.  Elevate above the level of the heart whenever not in use.  If symptoms or not improved in 3 to 5 days follow-up with PCP or Ortho.  If symptoms worsen or new symptoms develop report to the emergency room immediately.

## 2025-06-10 NOTE — PROGRESS NOTES
[facility]  Name: Juan Lucero      : 1955      MRN: [id number]  Encounter Provider: YOUNG [place]  Encounter Date: 2025   Encounter department: [facility] [place]  :  Assessment & Plan  Ankle injury, left, initial encounter    Orders:  •  XR ankle 3+ vw left; Future  •  Ambulatory Referral to Podiatry; Future    Left ankle strain, initial encounter           Xray left ankle: Negative for acte fracture or dislocation identified by me., pending final read.  Conservative pain management with tylenol/motrin  RICE, ace wrap as needed.  Follow up with PCP if no improvement in 5-7 days.    Podiatry referral.     Patient Instructions  Follow up with PCP in 3-5 days.  Proceed to  ER if symptoms worsen.    If tests are performed, our office will contact you with results only if changes need to made to the care plan discussed with you at the visit. You can review your full results on [facility] MyChart.    Chief Complaint:   Chief Complaint   Patient presents with   • Ankle Pain     Pt twisted left ankle getting out of bed two weeks ago and pain continues.      History of Present Illness {?Quick Links Encounters * My Last Note * Last Note in Specialty * Snapshot * Since Last Visit * History :26112}  Pt is a 69 year old male presenting with left ankle pain after turning his ankle while getting out of bed 3 weeks ago.  Reports improvement of swelling over the last 2 weeks however continues with intermittent pain with ambulation, and when going from sit to stand.  He denies numbness, tingling, weakness.    Ankle Pain           Review of Systems   Musculoskeletal:  Positive for gait problem and joint swelling.     Past Medical History   Past Medical History[1]  Past Surgical History[2]  Family History[3]  he reports that he has never smoked. He has never used smokeless tobacco. He reports that he does not drink alcohol and does not use drugs.  Current Outpatient Medications    Medication Instructions   • cholestyramine (QUESTRAN) 4 g, Oral, 3 times daily with meals   • clobetasol (TEMOVATE) 0.05 % ointment Topical, 2 times daily   • clonazePAM (KLONOPIN) 0.5 mg, Daily at bedtime   • cycloSPORINE (RESTASIS) 0.05 % ophthalmic emulsion    • dicyclomine (BENTYL) 10 mg, Oral, 3 times daily before meals   • famotidine (PEPCID) 20 mg, 2 times daily   • fluticasone (FLONASE) 50 mcg/act nasal spray 1 spray, Nasal, Daily   • methotrexate 2.5 MG tablet Take by mouth 5 tablets on Sundays   • nortriptyline (PAMELOR) 10 mg, Daily at bedtime   • pantoprazole (PROTONIX) 40 mg, Oral, Daily before breakfast   • QUEtiapine (SEROQUEL) 50 mg, Daily at bedtime   • simvastatin (ZOCOR) 10 mg, Oral, Daily at bedtime   • vilazodone (VIIBRYD) 60 mg, Daily with breakfast   • vitamin B-12 (VITAMIN B-12) 1,000 mcg, Oral, Daily   Allergies[4]     Objective {?Quick Links Trend Vitals * Enter New Vitals * Results Review * Timeline (Adult) * Labs * Imaging * Cardiology * Procedures * Lung Cancer Screening * Surgical eConsent :[id number]}  /66   Pulse 85   Temp 98 °F (36.7 °C)   Resp 16   Wt 73.5 kg (162 lb)   SpO2 97%   BMI 25.37 kg/m²      Physical Exam  Vitals and nursing note reviewed.   Constitutional:       General: He is not in acute distress.     Appearance: Normal appearance. He is normal weight.     Cardiovascular:      Pulses: Normal pulses.     Musculoskeletal:      Left ankle: No swelling, deformity or ecchymosis. Tenderness present over the lateral malleolus. Normal range of motion. Normal pulse.      Left foot: Normal range of motion and normal capillary refill. Tenderness present. No swelling. Normal pulse.        Feet:      Skin:     General: Skin is warm and dry.      Capillary Refill: Capillary refill takes less than 2 seconds.     Neurological:      General: No focal deficit present.      Mental Status: He is alert.         Portions of the record may have been created with voice recognition  "software.  Occasional wrong word or \"sound a like\" substitutions may have occurred due to the inherent limitations of voice recognition software.  Read the chart carefully and recognize, using context, where substitutions have occurred.         [1]  Past Medical History:  Diagnosis Date   • Anxiety    • Depression    • GERD (gastroesophageal reflux disease)    • Hyperlipidemia    • Kidney stone    • Polymyalgia rheumatica syndrome (HCC)    • Psychiatric disorder     anxiety   [2]  Past Surgical History:  Procedure Laterality Date   • APPENDECTOMY     • COLONOSCOPY     • KIDNEY STONE SURGERY     • WA RPR 1ST INGUN HRNA AGE 4 YRS/> REDUCIBLE Left 2024    Procedure: REPAIR HERNIA INGUINAL;  Surgeon: Pepe Woodall MD;  Location: [facility] MAIN [facility];  Service: General   • WA RPR AA HERNIA 1ST < 3 CM REDUCIBLE N/A 2024    Procedure: REPAIR HERNIA UMBILICAL;  Surgeon: Pepe Woodall MD;  Location: [facility];  Service: General   • TONSILLECTOMY     • TRANSURETHRAL RESECTION OF PROSTATE     • WISDOM TOOTH EXTRACTION     [3]  Family History  Problem Relation Name Age of Onset   • Diabetes Mother Bonnie Cerdapatrick    • Cancer Mother Bonnie HurleyLucero         MYLENOMA   • Heart attack Father Juventino STEWART Nic    • Prostate cancer Father Juventino STEWART Nic    • Cancer Father Juventino STEWART Nic         PROSTATE    • Heart disease Father Juventino STEWART Nic            [4]  No Known Allergies"

## 2025-07-18 ENCOUNTER — NURSE TRIAGE (OUTPATIENT)
Age: 70
End: 2025-07-18

## 2025-07-18 ENCOUNTER — OFFICE VISIT (OUTPATIENT)
Dept: INTERNAL MEDICINE CLINIC | Facility: CLINIC | Age: 70
End: 2025-07-18
Payer: MEDICARE

## 2025-07-18 VITALS
WEIGHT: 162 LBS | SYSTOLIC BLOOD PRESSURE: 128 MMHG | HEART RATE: 59 BPM | DIASTOLIC BLOOD PRESSURE: 70 MMHG | TEMPERATURE: 98.5 F | OXYGEN SATURATION: 98 % | HEIGHT: 67 IN | BODY MASS INDEX: 25.43 KG/M2

## 2025-07-18 DIAGNOSIS — R26.2 AMBULATORY DYSFUNCTION: ICD-10-CM

## 2025-07-18 DIAGNOSIS — E11.9 TYPE 2 DIABETES, HBA1C GOAL < 7% (HCC): ICD-10-CM

## 2025-07-18 DIAGNOSIS — K90.89 OTHER SPECIFIED INTESTINAL MALABSORPTION: ICD-10-CM

## 2025-07-18 DIAGNOSIS — F32.89 OTHER DEPRESSION: Primary | ICD-10-CM

## 2025-07-18 LAB
DME PARACHUTE DELIVERY DATE REQUESTED: NORMAL
DME PARACHUTE ITEM DESCRIPTION: NORMAL
DME PARACHUTE ORDER STATUS: NORMAL
DME PARACHUTE SUPPLIER NAME: NORMAL
DME PARACHUTE SUPPLIER PHONE: NORMAL

## 2025-07-18 PROCEDURE — 99214 OFFICE O/P EST MOD 30 MIN: CPT | Performed by: INTERNAL MEDICINE

## 2025-07-18 PROCEDURE — G2211 COMPLEX E/M VISIT ADD ON: HCPCS | Performed by: INTERNAL MEDICINE

## 2025-07-18 NOTE — PROGRESS NOTES
Assessment/Plan:           1. Other depression  Comments:  Continue current antidepressants as has been prescribed by his psychiatrist Dr. Armenta.  Orders:  -     Ambulatory referral to Psych Services; Future  -     [place] Total AB W Reflex to IGM/IGG; Future; Expected date: Collect anytime  2. Type 2 diabetes, HbA1c goal < 7% ([facility])  -     CBC and differential  -     Comprehensive metabolic panel  3. Other specified intestinal malabsorption  -     Vitamin B12  -     Vitamin D 25 hydroxy  4. Ambulatory dysfunction  -     Cane         1. Other depression (Primary)    Hydration and regular 45-minute recommended.  Psychological counseling advised.    No problem-specific Assessment & Plan notes found for this encounter.           Subjective:      Patient ID: Juan Lucero is a 69 y.o. male.    Depression  Pertinent negatives include no abdominal pain, chest pain, chills, fatigue, fever, headaches, neck pain, rash, sore throat or weakness.       The following portions of the patient's history were reviewed and updated as appropriate: He  has a past medical history of Anxiety, Depression, GERD (gastroesophageal reflux disease), Hyperlipidemia, Kidney stone, Polymyalgia rheumatica syndrome ([facility]), and Psychiatric disorder.  He   Patient Active Problem List    Diagnosis Date Noted    Ambulatory dysfunction 07/18/2025    Allergic rhinitis due to allergen 01/06/2025    GONZALEZ on CPAP 08/27/2024    GONZALEZ (obstructive sleep apnea) 06/11/2024    Type 2 diabetes, HbA1c goal < 7% ([facility]) 05/17/2024    Excessive daytime sleepiness 05/08/2024    Sleep disturbance 05/08/2024    Periodic limb movement 05/08/2024    Temporal arteritis ([facility]) 02/02/2024    Left inguinal hernia 11/13/2023    Umbilical hernia without obstruction and without gangrene 11/03/2023    Gastroesophageal reflux disease without esophagitis 11/03/2023    Depression, recurrent ([facility]) 05/23/2023    Preop exam for internal medicine 07/20/2022    Polymyalgia rheumatica  syndrome ([facility]) 05/18/2022    Impaired fasting blood sugar 05/18/2022    Other chest pain 11/17/2020    Pain in left knee 05/23/2018    Left facial numbness 12/18/2016    History of temporal arteritis 12/18/2016    HTN (hypertension) 12/18/2016    Mixed hyperlipidemia 12/18/2016    Acute nonintractable headache 12/18/2016    Obsessive compulsive disorder 12/18/2016    Anxiety 12/18/2016    ADH disorder 12/18/2016     He  has a past surgical history that includes Appendectomy; Tonsillectomy; Kidney stone surgery; Transurethral resection of prostate; [place] tooth extraction; Colonoscopy; pr rpr 1st ingun hrna age 4 yrs/> reducible (Left, 2/16/2024); and pr rpr aa hernia 1st < 3 cm reducible (N/A, 2/16/2024).  His family history includes Cancer in his father and mother; Diabetes in his mother; Heart attack in his father; Heart disease in his father; Prostate cancer in his father.  He  reports that he has never smoked. He has never used smokeless tobacco. He reports that he does not drink alcohol and does not use drugs.  Current Outpatient Medications   Medication Sig Dispense Refill    clonazePAM (KlonoPIN) 0.5 mg tablet Take 0.5 mg by mouth daily at bedtime .5 mg once at night, and half in morning      QUEtiapine (SEROquel) 50 mg tablet Take 50 mg by mouth daily at bedtime      simvastatin (ZOCOR) 10 mg tablet Take 1 tablet (10 mg total) by mouth daily at bedtime 90 tablet 1    vilazodone (VIIBRYD) 40 mg tablet Take 60 mg by mouth daily with breakfast      cholestyramine (QUESTRAN) 4 g packet Take 1 packet (4 g total) by mouth 3 (three) times a day with meals (Patient not taking: Reported on 12/19/2024) 90 packet 0    clobetasol (TEMOVATE) 0.05 % ointment Apply topically 2 (two) times a day (Patient not taking: Reported on 9/11/2024) 120 g 1    cycloSPORINE (RESTASIS) 0.05 % ophthalmic emulsion  (Patient not taking: Reported on 10/8/2024)      dicyclomine (BENTYL) 10 mg capsule Take 1 capsule (10 mg total) by mouth 3  (three) times a day before meals for 10 days (Patient not taking: Reported on 7/18/2025) 30 capsule 0    famotidine (PEPCID) 20 mg tablet Take 20 mg by mouth 2 (two) times a day (Patient not taking: Reported on 9/11/2024)      fluticasone (FLONASE) 50 mcg/act nasal spray 1 spray into each nostril daily (Patient not taking: Reported on 3/21/2025) 16 g 0    methotrexate 2.5 MG tablet Take by mouth 5 tablets on Sundays (Patient not taking: Reported on 5/30/2025)      nortriptyline (PAMELOR) 10 mg capsule Take 10 mg by mouth daily at bedtime (Patient not taking: Reported on 7/18/2025)      pantoprazole (PROTONIX) 40 mg tablet Take 1 tablet (40 mg total) by mouth daily before breakfast (Patient not taking: Reported on 6/9/2025) 30 tablet 0    vitamin B-12 (VITAMIN B-12) 1,000 mcg tablet Take 1 tablet (1,000 mcg total) by mouth daily (Patient not taking: Reported on 12/19/2024) 90 tablet 1     No current facility-administered medications for this visit.     Current Outpatient Medications on File Prior to Visit   Medication Sig    clonazePAM (KlonoPIN) 0.5 mg tablet Take 0.5 mg by mouth daily at bedtime .5 mg once at night, and half in morning    QUEtiapine (SEROquel) 50 mg tablet Take 50 mg by mouth daily at bedtime    simvastatin (ZOCOR) 10 mg tablet Take 1 tablet (10 mg total) by mouth daily at bedtime    vilazodone (VIIBRYD) 40 mg tablet Take 60 mg by mouth daily with breakfast    cholestyramine (QUESTRAN) 4 g packet Take 1 packet (4 g total) by mouth 3 (three) times a day with meals (Patient not taking: Reported on 12/19/2024)    clobetasol (TEMOVATE) 0.05 % ointment Apply topically 2 (two) times a day (Patient not taking: Reported on 9/11/2024)    cycloSPORINE (RESTASIS) 0.05 % ophthalmic emulsion  (Patient not taking: Reported on 10/8/2024)    dicyclomine (BENTYL) 10 mg capsule Take 1 capsule (10 mg total) by mouth 3 (three) times a day before meals for 10 days (Patient not taking: Reported on 7/18/2025)     "famotidine (PEPCID) 20 mg tablet Take 20 mg by mouth 2 (two) times a day (Patient not taking: Reported on 9/11/2024)    fluticasone (FLONASE) 50 mcg/act nasal spray 1 spray into each nostril daily (Patient not taking: Reported on 3/21/2025)    methotrexate 2.5 MG tablet Take by mouth 5 tablets on Sundays (Patient not taking: Reported on 5/30/2025)    nortriptyline (PAMELOR) 10 mg capsule Take 10 mg by mouth daily at bedtime (Patient not taking: Reported on 7/18/2025)    pantoprazole (PROTONIX) 40 mg tablet Take 1 tablet (40 mg total) by mouth daily before breakfast (Patient not taking: Reported on 6/9/2025)    vitamin B-12 (VITAMIN B-12) 1,000 mcg tablet Take 1 tablet (1,000 mcg total) by mouth daily (Patient not taking: Reported on 12/19/2024)     No current facility-administered medications on file prior to visit.     There are no discontinued medications.   He has no known allergies..    Review of Systems   Constitutional:  Negative for appetite change, chills, fatigue and fever.   HENT:  Negative for sore throat and trouble swallowing.    Eyes:  Negative for redness.   Respiratory:  Negative for shortness of breath.    Cardiovascular:  Negative for chest pain and palpitations.   Gastrointestinal:  Negative for abdominal pain, constipation and diarrhea.   Genitourinary:  Negative for dysuria and hematuria.   Musculoskeletal:  Negative for back pain and neck pain.   Skin:  Negative for rash.   Neurological:  Negative for seizures, weakness and headaches.   Hematological:  Negative for adenopathy.   Psychiatric/Behavioral:  Positive for depression. Negative for confusion. The patient is not nervous/anxious.          Objective:      /70 (BP Location: Left arm, Patient Position: Sitting, Cuff Size: Standard)   Pulse 59   Temp 98.5 °F (36.9 °C) (Temporal)   Ht 5' 7\" (1.702 m)   Wt 73.5 kg (162 lb)   SpO2 98%   BMI 25.37 kg/m²     Results Reviewed       None            Recent Results (from the past 8 weeks) "   Ortiz Cordero    Collection Time: 07/18/25  3:51 PM   Result Value Ref Range    Supplier Name [employer]     Supplier Phone Number [phone]     Order Status Pending Further Review     Delivery Note      Delivery Request Date 07/18/2025     Item Description Alex Baez         Physical Exam  Constitutional:       General: He is not in acute distress.     Appearance: Normal appearance.   HENT:      Head: Normocephalic and atraumatic.      Nose: Nose normal.      Mouth/Throat:      Mouth: Mucous membranes are moist.     Eyes:      Extraocular Movements: Extraocular movements intact.      Pupils: Pupils are equal, round, and reactive to light.       Cardiovascular:      Rate and Rhythm: Normal rate and regular rhythm.      Pulses: Normal pulses.      Heart sounds: Normal heart sounds. No murmur heard.     No friction rub.   Pulmonary:      Effort: Pulmonary effort is normal. No respiratory distress.      Breath sounds: Normal breath sounds. No wheezing.   Abdominal:      General: Abdomen is flat. Bowel sounds are normal. There is no distension.      Palpations: Abdomen is soft. There is no mass.      Tenderness: There is no abdominal tenderness. There is no guarding.     Musculoskeletal:         General: Normal range of motion.      Cervical back: Neck supple.     Neurological:      General: No focal deficit present.      Mental Status: He is alert and oriented to person, place, and time. Mental status is at baseline.      Cranial Nerves: No cranial nerve deficit.     Psychiatric:         Mood and Affect: Mood normal.         Behavior: Behavior normal.

## 2025-07-18 NOTE — TELEPHONE ENCOUNTER
"REASON FOR CONVERSATION: Depression    SYMPTOMS: depression    OTHER HEALTH INFORMATION: Patient feels overwhelmed by family health issues, does not feel connected to society... No self harm ideation.     PROTOCOL DISPOSITION: See Today in Office (overriding See Within 3 Days in Office)    CARE ADVICE PROVIDED: Appointment made with office. Possibility of psychiatric counseling may be discussed.    PRACTICE FOLLOW-UP: Please advise.                    Reason for Disposition   Depression is getting worse (e.g.,sleeping poorly, less able to do activities of daily living)    Answer Assessment - Initial Assessment Questions  1. CONCERN: \"What happened that made you call today?\"      depression  2. DEPRESSION SYMPTOM SCREENING: \"How are you feeling overall?\" (e.g., decreased energy, increased sleeping or difficulty sleeping, difficulty concentrating, feelings of sadness, guilt, hopelessness, or worthlessness)      Doesn't want to be involved with activities, no motivation, feeling worthless  3. RISK OF HARM - SUICIDAL IDEATION:  \"Do you ever have thoughts of hurting or killing yourself?\"  (e.g., yes, no, no but preoccupation with thoughts about death)      no  4. RISK OF HARM - HOMICIDAL IDEATION:  \"Do you ever have thoughts of hurting or killing someone else?\"  (e.g., yes, no, no but preoccupation with thoughts about death)      no  5. FUNCTIONAL IMPAIRMENT: \"How have things been going for you overall? Have you had more difficulty than usual doing your normal daily activities?\"  (e.g., better, same, worse; self-care, school, work, interactions)      Depression weighing him down to point that he is not socializing  6. SUPPORT: \"Who is with you now?\" \"Who do you live with?\" \"Do you have family or friends who you can talk to?\"       Volunteers at [facility]  7. THERAPIST: \"Do you have a counselor or therapist?\" If Yes, ask: \"What is their name?\"      no  8. STRESSORS: \"Has there been any new stress or recent changes in your " "life?\"      Mother and sisters failing health  9. ALCOHOL USE OR SUBSTANCE USE (DRUG USE): \"Do you drink alcohol or use any illegal drugs?\"      Did not say  10. OTHER: \"Do you have any other physical symptoms right now?\" (e.g., fever)        no    Protocols used: Depression-Adult-[place]    "

## 2025-07-21 ENCOUNTER — TELEPHONE (OUTPATIENT)
Dept: ADMINISTRATIVE | Facility: OTHER | Age: 70
End: 2025-07-21

## 2025-07-21 ENCOUNTER — APPOINTMENT (OUTPATIENT)
Dept: LAB | Facility: CLINIC | Age: 70
End: 2025-07-21
Attending: INTERNAL MEDICINE
Payer: MEDICARE

## 2025-07-21 DIAGNOSIS — F32.89 OTHER DEPRESSION: ICD-10-CM

## 2025-07-21 LAB
25(OH)D3 SERPL-MCNC: 29.7 NG/ML (ref 30–100)
ALBUMIN SERPL BCG-MCNC: 4.1 G/DL (ref 3.5–5)
ALP SERPL-CCNC: 85 U/L (ref 34–104)
ALT SERPL W P-5'-P-CCNC: 22 U/L (ref 7–52)
ANION GAP SERPL CALCULATED.3IONS-SCNC: 5 MMOL/L (ref 4–13)
AST SERPL W P-5'-P-CCNC: 20 U/L (ref 13–39)
B BURGDOR IGG+IGM SER QL IA: NEGATIVE
BASOPHILS # BLD AUTO: 0.04 THOUSANDS/ÂΜL (ref 0–0.1)
BASOPHILS NFR BLD AUTO: 1 % (ref 0–1)
BILIRUB SERPL-MCNC: 0.48 MG/DL (ref 0.2–1)
BUN SERPL-MCNC: 15 MG/DL (ref 5–25)
CALCIUM SERPL-MCNC: 9.3 MG/DL (ref 8.4–10.2)
CHLORIDE SERPL-SCNC: 105 MMOL/L (ref 96–108)
CO2 SERPL-SCNC: 30 MMOL/L (ref 21–32)
CREAT SERPL-MCNC: 0.99 MG/DL (ref 0.6–1.3)
EOSINOPHIL # BLD AUTO: 0.15 THOUSAND/ÂΜL (ref 0–0.61)
EOSINOPHIL NFR BLD AUTO: 3 % (ref 0–6)
ERYTHROCYTE [DISTWIDTH] IN BLOOD BY AUTOMATED COUNT: 12.7 % (ref 11.6–15.1)
GFR SERPL CREATININE-BSD FRML MDRD: 76 ML/MIN/1.73SQ M
GLUCOSE P FAST SERPL-MCNC: 125 MG/DL (ref 65–99)
HCT VFR BLD AUTO: 43.9 % (ref 36.5–49.3)
HGB BLD-MCNC: 14.6 G/DL (ref 12–17)
IMM GRANULOCYTES # BLD AUTO: 0 THOUSAND/UL (ref 0–0.2)
IMM GRANULOCYTES NFR BLD AUTO: 0 % (ref 0–2)
LYMPHOCYTES # BLD AUTO: 1.4 THOUSANDS/ÂΜL (ref 0.6–4.47)
LYMPHOCYTES NFR BLD AUTO: 32 % (ref 14–44)
MCH RBC QN AUTO: 28.9 PG (ref 26.8–34.3)
MCHC RBC AUTO-ENTMCNC: 33.3 G/DL (ref 31.4–37.4)
MCV RBC AUTO: 87 FL (ref 82–98)
MONOCYTES # BLD AUTO: 0.4 THOUSAND/ÂΜL (ref 0.17–1.22)
MONOCYTES NFR BLD AUTO: 9 % (ref 4–12)
NEUTROPHILS # BLD AUTO: 2.46 THOUSANDS/ÂΜL (ref 1.85–7.62)
NEUTS SEG NFR BLD AUTO: 55 % (ref 43–75)
NRBC BLD AUTO-RTO: 0 /100 WBCS
PLATELET # BLD AUTO: 190 THOUSANDS/UL (ref 149–390)
PMV BLD AUTO: 10.5 FL (ref 8.9–12.7)
POTASSIUM SERPL-SCNC: 3.9 MMOL/L (ref 3.5–5.3)
PROT SERPL-MCNC: 6.2 G/DL (ref 6.4–8.4)
RBC # BLD AUTO: 5.05 MILLION/UL (ref 3.88–5.62)
SODIUM SERPL-SCNC: 140 MMOL/L (ref 135–147)
VIT B12 SERPL-MCNC: 278 PG/ML (ref 180–914)
WBC # BLD AUTO: 4.45 THOUSAND/UL (ref 4.31–10.16)

## 2025-07-21 PROCEDURE — 80053 COMPREHEN METABOLIC PANEL: CPT | Performed by: INTERNAL MEDICINE

## 2025-07-21 PROCEDURE — 86618 LYME DISEASE ANTIBODY: CPT

## 2025-07-21 PROCEDURE — 82306 VITAMIN D 25 HYDROXY: CPT | Performed by: INTERNAL MEDICINE

## 2025-07-21 PROCEDURE — 82607 VITAMIN B-12: CPT | Performed by: INTERNAL MEDICINE

## 2025-07-21 PROCEDURE — 36415 COLL VENOUS BLD VENIPUNCTURE: CPT

## 2025-07-21 PROCEDURE — 85025 COMPLETE CBC W/AUTO DIFF WBC: CPT | Performed by: INTERNAL MEDICINE

## 2025-07-25 ENCOUNTER — TELEPHONE (OUTPATIENT)
Age: 70
End: 2025-07-25

## 2025-08-01 ENCOUNTER — OFFICE VISIT (OUTPATIENT)
Dept: INTERNAL MEDICINE CLINIC | Facility: CLINIC | Age: 70
End: 2025-08-01
Payer: MEDICARE

## 2025-08-01 VITALS
SYSTOLIC BLOOD PRESSURE: 124 MMHG | OXYGEN SATURATION: 98 % | HEIGHT: 67 IN | BODY MASS INDEX: 25.74 KG/M2 | TEMPERATURE: 98.2 F | HEART RATE: 78 BPM | WEIGHT: 164 LBS | DIASTOLIC BLOOD PRESSURE: 74 MMHG

## 2025-08-01 DIAGNOSIS — E11.9 TYPE 2 DIABETES, HBA1C GOAL < 7% (HCC): Primary | ICD-10-CM

## 2025-08-01 DIAGNOSIS — F33.9 DEPRESSION, RECURRENT (HCC): ICD-10-CM

## 2025-08-01 DIAGNOSIS — E78.2 MIXED HYPERLIPIDEMIA: ICD-10-CM

## 2025-08-01 PROCEDURE — G2211 COMPLEX E/M VISIT ADD ON: HCPCS | Performed by: INTERNAL MEDICINE

## 2025-08-01 PROCEDURE — 99214 OFFICE O/P EST MOD 30 MIN: CPT | Performed by: INTERNAL MEDICINE

## 2025-08-06 ENCOUNTER — TELEPHONE (OUTPATIENT)
Age: 70
End: 2025-08-06

## 2025-08-07 ENCOUNTER — TELEPHONE (OUTPATIENT)
Age: 70
End: 2025-08-07

## 2025-08-13 ENCOUNTER — TELEPHONE (OUTPATIENT)
Dept: OTHER | Facility: OTHER | Age: 70
End: 2025-08-13

## 2025-08-14 ENCOUNTER — TELEPHONE (OUTPATIENT)
Dept: PSYCHIATRY | Facility: CLINIC | Age: 70
End: 2025-08-14

## 2025-08-14 ENCOUNTER — TELEPHONE (OUTPATIENT)
Age: 70
End: 2025-08-14

## 2025-08-15 ENCOUNTER — OFFICE VISIT (OUTPATIENT)
Dept: INTERNAL MEDICINE CLINIC | Facility: CLINIC | Age: 70
End: 2025-08-15
Payer: MEDICARE

## (undated) DEVICE — PENCIL ELECTROSURG E-Z CLEAN -0035H

## (undated) DEVICE — CHLORAPREP HI-LITE 26ML ORANGE

## (undated) DEVICE — SUT VICRYL 2-0 REEL 54 IN J286G

## (undated) DEVICE — DECANTER: Brand: UNBRANDED

## (undated) DEVICE — NEEDLE 23G X 1 1/2 SAFETY-GLIDE THIN WALL

## (undated) DEVICE — ELECTRODE BLADE MOD E-Z CLEAN  2.75IN 7CM -0012AM

## (undated) DEVICE — SUT VICRYL 3-0 SH 27 IN J416H

## (undated) DEVICE — SUT MONOCRYL 4-0 PS-2 27 IN Y426H

## (undated) DEVICE — SUT VICRYL 1 CT-1 27 IN J261H

## (undated) DEVICE — INTENDED FOR TISSUE SEPARATION, AND OTHER PROCEDURES THAT REQUIRE A SHARP SURGICAL BLADE TO PUNCTURE OR CUT.: Brand: BARD-PARKER SAFETY BLADES SIZE 15, STERILE

## (undated) DEVICE — SUT VICRYL 2-0 SH 27 IN UNDYED J417H

## (undated) DEVICE — TOWEL SURG XR DETECT GREEN STRL RFD

## (undated) DEVICE — GLOVE SRG BIOGEL ECLIPSE 7

## (undated) DEVICE — SUT PDS II 3-0 SH 27 IN Z316H

## (undated) DEVICE — BETHLEHEM UNIVERSAL MINOR GEN: Brand: CARDINAL HEALTH

## (undated) DEVICE — ADHESIVE SKIN HIGH VISCOSITY EXOFIN 1ML

## (undated) DEVICE — SUT VICRYL 0 CT-2 18 IN J727D

## (undated) DEVICE — UNDYED BRAIDED (POLYGLACTIN 910), SYNTHETIC ABSORBABLE SUTURE: Brand: COATED VICRYL